# Patient Record
Sex: FEMALE | Race: WHITE | NOT HISPANIC OR LATINO | Employment: FULL TIME | URBAN - METROPOLITAN AREA
[De-identification: names, ages, dates, MRNs, and addresses within clinical notes are randomized per-mention and may not be internally consistent; named-entity substitution may affect disease eponyms.]

---

## 2023-05-26 ENCOUNTER — OCCUPATIONAL HEALTH (OUTPATIENT)
Dept: URGENT CARE | Facility: CLINIC | Age: 58
End: 2023-05-26
Payer: MEDICARE

## 2023-05-26 PROCEDURE — 80305 PR COLLECTION ONLY DRUG SCREEN: ICD-10-PCS | Mod: S$GLB,,, | Performed by: EMERGENCY MEDICINE

## 2023-05-26 PROCEDURE — 80305 DRUG TEST PRSMV DIR OPT OBS: CPT | Mod: S$GLB,,, | Performed by: EMERGENCY MEDICINE

## 2023-06-12 ENCOUNTER — HOSPITAL ENCOUNTER (INPATIENT)
Facility: HOSPITAL | Age: 58
LOS: 9 days | Discharge: NURSING, REHAB OR LONG-TERM CARE | DRG: 271 | End: 2023-06-21
Attending: EMERGENCY MEDICINE | Admitting: INTERNAL MEDICINE
Payer: MEDICARE

## 2023-06-12 DIAGNOSIS — R07.9 CHEST PAIN: ICD-10-CM

## 2023-06-12 DIAGNOSIS — I99.8 VASCULAR OCCLUSION: Primary | ICD-10-CM

## 2023-06-12 DIAGNOSIS — R20.9 COLD RIGHT FOOT: ICD-10-CM

## 2023-06-12 DIAGNOSIS — I70.221 CRITICAL LIMB ISCHEMIA OF RIGHT LOWER EXTREMITY: ICD-10-CM

## 2023-06-12 DIAGNOSIS — I50.9 CHF (CONGESTIVE HEART FAILURE): ICD-10-CM

## 2023-06-12 PROBLEM — K52.0 RADIATION COLITIS: Status: ACTIVE | Noted: 2022-12-23

## 2023-06-12 PROBLEM — Z93.3 COLOSTOMY STATUS: Status: ACTIVE | Noted: 2023-02-23

## 2023-06-12 PROBLEM — C53.9 MALIGNANT NEOPLASM OF CERVIX: Status: ACTIVE | Noted: 2020-11-10

## 2023-06-12 PROBLEM — I10 ESSENTIAL (PRIMARY) HYPERTENSION: Status: ACTIVE | Noted: 2023-01-09

## 2023-06-12 LAB
ALBUMIN SERPL BCP-MCNC: 4.1 G/DL (ref 3.5–5.2)
ALP SERPL-CCNC: 66 U/L (ref 55–135)
ALT SERPL W/O P-5'-P-CCNC: 19 U/L (ref 10–44)
ANION GAP SERPL CALC-SCNC: 9 MMOL/L (ref 8–16)
APTT PPP: 24.5 SEC (ref 21–32)
APTT PPP: 47 SEC (ref 21–32)
AST SERPL-CCNC: 15 U/L (ref 10–40)
BASOPHILS # BLD AUTO: 0.03 K/UL (ref 0–0.2)
BASOPHILS NFR BLD: 0.6 % (ref 0–1.9)
BILIRUB SERPL-MCNC: 0.4 MG/DL (ref 0.1–1)
BILIRUB UR QL STRIP: NEGATIVE
BNP SERPL-MCNC: 56 PG/ML (ref 0–99)
BUN SERPL-MCNC: 19 MG/DL (ref 6–20)
CALCIUM SERPL-MCNC: 8.7 MG/DL (ref 8.7–10.5)
CHLORIDE SERPL-SCNC: 102 MMOL/L (ref 95–110)
CK SERPL-CCNC: 139 U/L (ref 20–180)
CLARITY UR: CLEAR
CO2 SERPL-SCNC: 24 MMOL/L (ref 23–29)
COLOR UR: YELLOW
CREAT SERPL-MCNC: 0.7 MG/DL (ref 0.5–1.4)
CREAT SERPL-MCNC: 0.9 MG/DL (ref 0.5–1.4)
DIFFERENTIAL METHOD: NORMAL
EOSINOPHIL # BLD AUTO: 0.2 K/UL (ref 0–0.5)
EOSINOPHIL NFR BLD: 3.6 % (ref 0–8)
ERYTHROCYTE [DISTWIDTH] IN BLOOD BY AUTOMATED COUNT: 13.3 % (ref 11.5–14.5)
EST. GFR  (NO RACE VARIABLE): >60 ML/MIN/1.73 M^2
GLUCOSE SERPL-MCNC: 92 MG/DL (ref 70–110)
GLUCOSE UR QL STRIP: ABNORMAL
HCT VFR BLD AUTO: 38.5 % (ref 37–48.5)
HGB BLD-MCNC: 12.4 G/DL (ref 12–16)
HGB UR QL STRIP: NEGATIVE
IMM GRANULOCYTES # BLD AUTO: 0.02 K/UL (ref 0–0.04)
IMM GRANULOCYTES NFR BLD AUTO: 0.4 % (ref 0–0.5)
INR PPP: 0.9 (ref 0.8–1.2)
KETONES UR QL STRIP: NEGATIVE
LEUKOCYTE ESTERASE UR QL STRIP: NEGATIVE
LIPASE SERPL-CCNC: 26 U/L (ref 4–60)
LYMPHOCYTES # BLD AUTO: 1.5 K/UL (ref 1–4.8)
LYMPHOCYTES NFR BLD: 28.7 % (ref 18–48)
MAGNESIUM SERPL-MCNC: 1.7 MG/DL (ref 1.6–2.6)
MAGNESIUM SERPL-MCNC: 1.7 MG/DL (ref 1.6–2.6)
MCH RBC QN AUTO: 28.8 PG (ref 27–31)
MCHC RBC AUTO-ENTMCNC: 32.2 G/DL (ref 32–36)
MCV RBC AUTO: 90 FL (ref 82–98)
MONOCYTES # BLD AUTO: 0.5 K/UL (ref 0.3–1)
MONOCYTES NFR BLD: 9.5 % (ref 4–15)
NEUTROPHILS # BLD AUTO: 3 K/UL (ref 1.8–7.7)
NEUTROPHILS NFR BLD: 57.2 % (ref 38–73)
NITRITE UR QL STRIP: NEGATIVE
NRBC BLD-RTO: 0 /100 WBC
PH UR STRIP: 6 [PH] (ref 5–8)
PLATELET # BLD AUTO: 222 K/UL (ref 150–450)
PMV BLD AUTO: 9.7 FL (ref 9.2–12.9)
POTASSIUM SERPL-SCNC: 4.1 MMOL/L (ref 3.5–5.1)
PROT SERPL-MCNC: 7 G/DL (ref 6–8.4)
PROT UR QL STRIP: NEGATIVE
PROTHROMBIN TIME: 9.7 SEC (ref 9–12.5)
RBC # BLD AUTO: 4.3 M/UL (ref 4–5.4)
SAMPLE: NORMAL
SODIUM SERPL-SCNC: 135 MMOL/L (ref 136–145)
SP GR UR STRIP: 1.02 (ref 1–1.03)
TROPONIN I SERPL HS-MCNC: 2.7 PG/ML (ref 0–14.9)
TROPONIN I SERPL HS-MCNC: <2.3 PG/ML (ref 0–14.9)
TSH SERPL DL<=0.005 MIU/L-ACNC: 2.93 UIU/ML (ref 0.34–5.6)
URN SPEC COLLECT METH UR: ABNORMAL
UROBILINOGEN UR STRIP-ACNC: NEGATIVE EU/DL
WBC # BLD AUTO: 5.27 K/UL (ref 3.9–12.7)

## 2023-06-12 PROCEDURE — 25500020 PHARM REV CODE 255: Performed by: SURGERY

## 2023-06-12 PROCEDURE — 25000003 PHARM REV CODE 250: Performed by: SURGERY

## 2023-06-12 PROCEDURE — 25500020 PHARM REV CODE 255: Performed by: EMERGENCY MEDICINE

## 2023-06-12 PROCEDURE — 63600175 PHARM REV CODE 636 W HCPCS: Performed by: SURGERY

## 2023-06-12 PROCEDURE — 99153 MOD SED SAME PHYS/QHP EA: CPT | Performed by: SURGERY

## 2023-06-12 PROCEDURE — 82550 ASSAY OF CK (CPK): CPT | Performed by: EMERGENCY MEDICINE

## 2023-06-12 PROCEDURE — C1887 CATHETER, GUIDING: HCPCS | Performed by: SURGERY

## 2023-06-12 PROCEDURE — 84443 ASSAY THYROID STIM HORMONE: CPT | Performed by: EMERGENCY MEDICINE

## 2023-06-12 PROCEDURE — 36415 COLL VENOUS BLD VENIPUNCTURE: CPT | Performed by: EMERGENCY MEDICINE

## 2023-06-12 PROCEDURE — 85610 PROTHROMBIN TIME: CPT | Performed by: EMERGENCY MEDICINE

## 2023-06-12 PROCEDURE — 37221 HC ILIAC REVASC W/STENT: CPT | Mod: RT | Performed by: SURGERY

## 2023-06-12 PROCEDURE — 99291 CRITICAL CARE FIRST HOUR: CPT

## 2023-06-12 PROCEDURE — 37184 PRIM ART M-THRMBC 1ST VSL: CPT | Mod: RT | Performed by: SURGERY

## 2023-06-12 PROCEDURE — 83735 ASSAY OF MAGNESIUM: CPT | Performed by: EMERGENCY MEDICINE

## 2023-06-12 PROCEDURE — 84484 ASSAY OF TROPONIN QUANT: CPT | Performed by: EMERGENCY MEDICINE

## 2023-06-12 PROCEDURE — 96365 THER/PROPH/DIAG IV INF INIT: CPT

## 2023-06-12 PROCEDURE — 83880 ASSAY OF NATRIURETIC PEPTIDE: CPT | Performed by: EMERGENCY MEDICINE

## 2023-06-12 PROCEDURE — 85730 THROMBOPLASTIN TIME PARTIAL: CPT | Mod: 91 | Performed by: EMERGENCY MEDICINE

## 2023-06-12 PROCEDURE — 63600175 PHARM REV CODE 636 W HCPCS: Performed by: EMERGENCY MEDICINE

## 2023-06-12 PROCEDURE — 20000000 HC ICU ROOM

## 2023-06-12 PROCEDURE — 93010 ELECTROCARDIOGRAM REPORT: CPT | Mod: ,,, | Performed by: GENERAL PRACTICE

## 2023-06-12 PROCEDURE — 99900031 HC PATIENT EDUCATION (STAT)

## 2023-06-12 PROCEDURE — 93010 EKG 12-LEAD: ICD-10-PCS | Mod: ,,, | Performed by: GENERAL PRACTICE

## 2023-06-12 PROCEDURE — 96366 THER/PROPH/DIAG IV INF ADDON: CPT

## 2023-06-12 PROCEDURE — 37185 PRIM ART M-THRMBC SBSQ VSL: CPT | Mod: RT | Performed by: SURGERY

## 2023-06-12 PROCEDURE — 81003 URINALYSIS AUTO W/O SCOPE: CPT | Performed by: EMERGENCY MEDICINE

## 2023-06-12 PROCEDURE — 93005 ELECTROCARDIOGRAM TRACING: CPT | Performed by: GENERAL PRACTICE

## 2023-06-12 PROCEDURE — C1757 CATH, THROMBECTOMY/EMBOLECT: HCPCS | Performed by: SURGERY

## 2023-06-12 PROCEDURE — 99152 MOD SED SAME PHYS/QHP 5/>YRS: CPT | Performed by: SURGERY

## 2023-06-12 PROCEDURE — C1894 INTRO/SHEATH, NON-LASER: HCPCS | Performed by: SURGERY

## 2023-06-12 PROCEDURE — 36247 INS CATH ABD/L-EXT ART 3RD: CPT | Mod: XU,RT | Performed by: SURGERY

## 2023-06-12 PROCEDURE — 85025 COMPLETE CBC W/AUTO DIFF WBC: CPT | Performed by: EMERGENCY MEDICINE

## 2023-06-12 PROCEDURE — 80053 COMPREHEN METABOLIC PANEL: CPT | Performed by: EMERGENCY MEDICINE

## 2023-06-12 PROCEDURE — C1876 STENT, NON-COA/NON-COV W/DEL: HCPCS | Performed by: SURGERY

## 2023-06-12 PROCEDURE — 96375 TX/PRO/DX INJ NEW DRUG ADDON: CPT

## 2023-06-12 PROCEDURE — 83690 ASSAY OF LIPASE: CPT | Performed by: EMERGENCY MEDICINE

## 2023-06-12 PROCEDURE — C1725 CATH, TRANSLUMIN NON-LASER: HCPCS | Performed by: SURGERY

## 2023-06-12 PROCEDURE — C1769 GUIDE WIRE: HCPCS | Performed by: SURGERY

## 2023-06-12 DEVICE — STENT PRB35-08-060-080 PROTEGE EF V07
Type: IMPLANTABLE DEVICE | Site: ARTERIAL | Status: FUNCTIONAL
Brand: EVERFLEX™ PROTÉGÉ™ EVERFLEX™

## 2023-06-12 RX ORDER — ONDANSETRON 2 MG/ML
4 INJECTION INTRAMUSCULAR; INTRAVENOUS
Status: COMPLETED | OUTPATIENT
Start: 2023-06-12 | End: 2023-06-12

## 2023-06-12 RX ORDER — PREGABALIN 75 MG/1
75 CAPSULE ORAL 2 TIMES DAILY
COMMUNITY
Start: 2023-06-08 | End: 2023-07-04 | Stop reason: SDUPTHER

## 2023-06-12 RX ORDER — MORPHINE SULFATE 2 MG/ML
2 INJECTION, SOLUTION INTRAMUSCULAR; INTRAVENOUS
Status: COMPLETED | OUTPATIENT
Start: 2023-06-12 | End: 2023-06-12

## 2023-06-12 RX ORDER — CYCLOBENZAPRINE HCL 10 MG
10 TABLET ORAL 3 TIMES DAILY PRN
COMMUNITY
End: 2023-07-04 | Stop reason: SDUPTHER

## 2023-06-12 RX ORDER — FENTANYL CITRATE 50 UG/ML
INJECTION, SOLUTION INTRAMUSCULAR; INTRAVENOUS
Status: DISCONTINUED | OUTPATIENT
Start: 2023-06-12 | End: 2023-06-12 | Stop reason: HOSPADM

## 2023-06-12 RX ORDER — NORTRIPTYLINE HYDROCHLORIDE 25 MG/1
50 CAPSULE ORAL NIGHTLY
COMMUNITY
Start: 2023-06-08 | End: 2023-07-04 | Stop reason: SDUPTHER

## 2023-06-12 RX ORDER — FENTANYL CITRATE 50 UG/ML
25 INJECTION, SOLUTION INTRAMUSCULAR; INTRAVENOUS
Status: DISCONTINUED | OUTPATIENT
Start: 2023-06-12 | End: 2023-06-17

## 2023-06-12 RX ORDER — HEPARIN SODIUM 10000 [USP'U]/100ML
INJECTION, SOLUTION INTRAVENOUS
Status: DISCONTINUED | OUTPATIENT
Start: 2023-06-12 | End: 2023-06-13

## 2023-06-12 RX ORDER — HEPARIN SODIUM,PORCINE/D5W 25000/250
0-40 INTRAVENOUS SOLUTION INTRAVENOUS CONTINUOUS
Status: DISCONTINUED | OUTPATIENT
Start: 2023-06-12 | End: 2023-06-13

## 2023-06-12 RX ORDER — MIDAZOLAM HYDROCHLORIDE 1 MG/ML
INJECTION INTRAMUSCULAR; INTRAVENOUS
Status: DISCONTINUED | OUTPATIENT
Start: 2023-06-12 | End: 2023-06-12 | Stop reason: HOSPADM

## 2023-06-12 RX ORDER — ONDANSETRON 4 MG/1
4 TABLET, ORALLY DISINTEGRATING ORAL EVERY 8 HOURS PRN
COMMUNITY
Start: 2023-01-26 | End: 2023-07-04 | Stop reason: SDUPTHER

## 2023-06-12 RX ORDER — OXYCODONE AND ACETAMINOPHEN 10; 325 MG/1; MG/1
1 TABLET ORAL 3 TIMES DAILY PRN
Status: ON HOLD | COMMUNITY
Start: 2023-06-08 | End: 2023-06-21 | Stop reason: HOSPADM

## 2023-06-12 RX ORDER — LIDOCAINE HYDROCHLORIDE 10 MG/ML
INJECTION INFILTRATION; PERINEURAL
Status: DISCONTINUED | OUTPATIENT
Start: 2023-06-12 | End: 2023-06-12 | Stop reason: HOSPADM

## 2023-06-12 RX ORDER — IODIXANOL 320 MG/ML
INJECTION, SOLUTION INTRAVASCULAR
Status: DISCONTINUED | OUTPATIENT
Start: 2023-06-12 | End: 2023-06-12 | Stop reason: HOSPADM

## 2023-06-12 RX ORDER — HEPARIN SODIUM 10000 [USP'U]/ML
INJECTION, SOLUTION INTRAVENOUS; SUBCUTANEOUS
Status: DISCONTINUED | OUTPATIENT
Start: 2023-06-12 | End: 2023-06-12 | Stop reason: HOSPADM

## 2023-06-12 RX ADMIN — HEPARIN SODIUM 18 UNITS/KG/HR: 10000 INJECTION, SOLUTION INTRAVENOUS at 01:06

## 2023-06-12 RX ADMIN — FENTANYL CITRATE 25 MCG: 50 INJECTION, SOLUTION INTRAMUSCULAR; INTRAVENOUS at 07:06

## 2023-06-12 RX ADMIN — ONDANSETRON 4 MG: 2 INJECTION INTRAMUSCULAR; INTRAVENOUS at 12:06

## 2023-06-12 RX ADMIN — MORPHINE SULFATE 2 MG: 2 INJECTION, SOLUTION INTRAMUSCULAR; INTRAVENOUS at 12:06

## 2023-06-12 RX ADMIN — IOHEXOL 100 ML: 350 INJECTION, SOLUTION INTRAVENOUS at 02:06

## 2023-06-12 NOTE — H&P (VIEW-ONLY)
"Carine Darden is a 57 y.o. White female with known history of cervical cancer s/p chemoradiation, colostomy status, chronic tobacco use presented to the ED for evaluation of acute onset right leg pain that started last night. She reports developing "silvio horse" x 2 weeks. Then last night her Right foot developed severe pain and was very cool to the touch. Also had "numbness and tingling" and hip pain.This morning she went to work but due to persistent pain coworker called EMS and she was brought to the ER for evaluation. In the ER, she had no pulses noted in the right lower extremity. CTA showed         Complete occlusion of the right common iliac artery immediately distal to its origin.  2. While the superficial femoral artery on the right is patent without high-grade stenosis, there is occlusion of the right popliteal artery, with flow to the right calf via a small intramuscular branches.  3. Occlusion of the tibioperoneal trunk on the left. The anterior tibial artery is patent at its origin, but becomes threadlike distal to that point. Flow to the left ankle is noted be a small intramuscular collaterals and a threadlike anterior tibial artery.     CT scan from last year of the abdomen and pelvis did not show any atherosclerotic disease of significance.  Both iliac vessels appeared normal.  She does not appear to be in atrial fibrillation.  Right common internal and proximal external iliac artery are occluded with fairly normal runoff.      Impression is right iliac occlusion.  Unsure of etiology is the patient does not have any history of atrial fibrillation.  She does not have any significant radiation skin changes to the pelvis are groin about I suppose this could be possibility from her cervical cancer radiation  .  Recommend angiography today with percutaneous embolectomy thrombectomy.  Will need echocardiogram post angiography.  "

## 2023-06-12 NOTE — Clinical Note
An angiography of the  abdominal aorta was performed with the catheter and hand injected with 20 mL of contrast

## 2023-06-12 NOTE — OP NOTE
[facility]  Surgery Department  Operative Note    SUMMARY     Date of Procedure: 6/12/2023     Procedure: Procedure(s) (LRB):  Angiogram, Abdominal Aorta W/ Extremity Runoff (Right)  EMBOLECTOMY OR THROMBECTOMY, BLOOD VESSEL, LOWER EXTREMITY (Right)  PTA, Iliac Artery (Right)  Stent, Iliac Artery (Right)  THROMBOLYSIS OR THROMBECTOMY, BLOOD VESSEL, LOWER EXTREMITY (Right)     Surgeon(s) and Role:     * Moody Aleman MD - Primary    Assisting Surgeon: None    Pre-Operative Diagnosis: Cold right foot [R20.9]    Post-Operative Diagnosis: Post-Op Diagnosis Codes:     * Cold right foot [R20.9]    Anesthesia: RN IV Sedation    Operative Findings (including complications, if any):  Right common internal and external iliac artery thrombosis.  Distal right popliteal thrombosis with no visualization of tibial reconstitution    Description of Technical Procedures:  Abdominal aortogram.  Right lower extremity angiogram.  Bilateral femoral access.  Right iliac percutaneous mechanical thrombectomy with 6 Bangladeshi Angiojet catheter.  Stent angioplasty right common iliac severe focal stenosis with 8 x 60 self expanding stent.  Right popliteal catheterization from left femoral access with 10 cm infusion length correct Delroy catheter for tPA infusion    Patient was brought emergently to the cath lab was sedated with fentanyl and Versed.  Right femoral artery was accessed just above the femoral bifurcation with ultrasound guidance and a 6 Bangladeshi sheath was placed just into the distal external iliac artery angiogram was performed which showed occlusion of the distal external iliac just above the epigastric vessel common femoral profunda SFA were patent the distal popliteal was occluded with no visualization of tibial vessels reconstituting.  We then passed a Glidewire across the iliac occlusion into the abdominal aorta confirm we are within the true lumen of the abdominal aorta and Angiojet thrombectomy with 6 Bangladeshi  catheter was performed of the entire common and external iliac artery on the right side we had near 100% resolution of thrombus and uncovered a distal right common iliac artery stenosis.  This was treated with a 8 x 60 self expanding stent and post dilated with a 7 mm balloon with excellent result we then had rapid flow through the iliac and common femoral with no evidence of stenosis the SFA was patent and the popliteal occludes at the level of the joint and there is small collateral vessels seen below the knee but no named tibial vessels were visualized.  Then accessed the left femoral artery under roadmap imaging and advanced catheter up and over the bifurcation into the right SFA and then the 6 Tongan sheath was placed over the bifurcation a trial laser catheter was then used to cross into the right popliteal occlusion and a soft Glidewire was advanced which would go into the peroneal and the anterior tibial but would not advance into a true lumen after multiple attempts decided to place an infusion catheter see if we can recruit any distal runoff.  Exchanged out the long 6 Tongan sheath for a short 6 Tongan sheath so we did not have to 6 Tongan sheath in the right femoral artery.  Catheter was advanced over the wire into the popliteal and everything was sutured in place and tPA will be started at half a mg an hour.    Significant Surgical Tasks Conducted by the Assistant(s), if Applicable:     Estimated Blood Loss (EBL): * No values recorded between 6/12/2023  4:40 PM and 6/12/2023  5:45 PM *           Implants:   Implant Name Type Inv. Item Serial No.  Lot No. LRB No. Used Action   EVER FLEX [id number] STENT Self Expanding Stent   [employer] [id number] Right 1 Implanted       Specimens:   Specimen (24h ago, onward)      None                    Condition: Good    Disposition: PACU - hemodynamically stable.    Attestation: I was present and scrubbed for the entire procedure.

## 2023-06-12 NOTE — Clinical Note
An angiography of the  right lower extremity was performed with the sheath and hand injected with 10 mL of contrast

## 2023-06-12 NOTE — Clinical Note
An angiography was performed of the mid and distal right popliteal artery and infrapopliteal artery via hand injection with 5 mL of contrast

## 2023-06-12 NOTE — Clinical Note
The site was marked. The groin was prepped. The site was prepped with ChloraPrep. The site was clipped. The patient was draped. The patient was positioned supine. The patient was secured using safety straps and to an armboard.

## 2023-06-12 NOTE — Clinical Note
An angiography was performed of the ostial, proximal, mid and distal right external iliac artery via hand injection with 10 mL of contrast

## 2023-06-12 NOTE — Clinical Note
An angiography was performed of the ostial, proximal, mid and distal right external iliac artery via hand injection with 20 mL of contrast

## 2023-06-12 NOTE — Clinical Note
A dressing was applied to the right femoral artery puncture site. Learning About Meal Planning for Diabetes  Why plan your meals? Meal planning can be a key part of managing diabetes. Planning meals and snacks with the right balance of carbohydrate, protein, and fat can help you keep your blood sugar at the target level you set with your doctor. You don't have to eat special foods. You can eat what your family eats, including sweets once in a while. But you do have to pay attention to how often you eat and how much you eat of certain foods. You may want to work with a dietitian or a certified diabetes educator. He or she can give you tips and meal ideas and can answer your questions about meal planning. This health professional can also help you reach a healthy weight if that is one of your goals. What plan is right for you? Your dietitian or diabetes educator may suggest that you start with the plate format or carbohydrate counting. The plate format  The plate format is a simple way to help you manage how you eat. You plan meals by learning how much space each food should take on a plate. Using the plate format helps you spread carbohydrate throughout the day. It can make it easier to keep your blood sugar level within your target range. It also helps you see if you're eating healthy portion sizes. To use the plate format, you put non-starchy vegetables on half your plate. Add meat or meat substitutes on one-quarter of the plate. Put a grain or starchy vegetable (such as brown rice or a potato) on the final quarter of the plate. You can add a small piece of fruit and some low-fat or fat-free milk or yogurt, depending on your carbohydrate goal for each meal.  Here are some tips for using the plate format:  · Make sure that you are not using an oversized plate. A 9-inch plate is best. Many restaurants use larger plates. · Get used to using the plate format at home. Then you can use it when you eat out. · Write down your questions about using the plate format.  Talk to your doctor, a dietitian, or a diabetes educator about your concerns. Carbohydrate counting  With carbohydrate counting, you plan meals based on the amount of carbohydrate in each food. Carbohydrate raises blood sugar higher and more quickly than any other nutrient. It is found in desserts, breads and cereals, and fruit. It's also found in starchy vegetables such as potatoes and corn, grains such as rice and pasta, and milk and yogurt. Spreading carbohydrate throughout the day helps keep your blood sugar levels within your target range. Your daily amount depends on several things, including your weight, how active you are, which diabetes medicines you take, and what your goals are for your blood sugar levels. A registered dietitian or diabetes educator can help you plan how much carbohydrate to include in each meal and snack. A guideline for your daily amount of carbohydrate is:  · 45 to 60 grams at each meal. That's about the same as 3 to 4 carbohydrate servings. · 15 to 20 grams at each snack. That's about the same as 1 carbohydrate serving. The Nutrition Facts label on packaged foods tells you how much carbohydrate is in a serving of the food. First, look at the serving size on the food label. Is that the amount you eat in a serving? All of the nutrition information on a food label is based on that serving size. So if you eat more or less than that, you'll need to adjust the other numbers. Total carbohydrate is the next thing you need to look for on the label. If you count carbohydrate servings, one serving of carbohydrate is 15 grams. For foods that don't come with labels, such as fresh fruits and vegetables, you'll need a guide that lists carbohydrate in these foods. Ask your doctor, dietitian, or diabetes educator about books or other nutrition guides you can use.   If you take insulin, you need to know how many grams of carbohydrate are in a meal. This lets you know how much rapid-acting insulin to take before you eat. If you use an insulin pump, you get a constant rate of insulin during the day. So the pump must be programmed at meals to give you extra insulin to cover the rise in blood sugar after meals. When you know how much carbohydrate you will eat, you can take the right amount of insulin. Or, if you always use the same amount of insulin, you need to make sure that you eat the same amount of carbohydrate at meals. If you need more help to understand carbohydrate counting and food labels, ask your doctor, dietitian, or diabetes educator. How do you get started with meal planning? Here are some tips to get started:  · Plan your meals a week at a time. Don't forget to include snacks too. · Use cookbooks or online recipes to plan several main meals. Plan some quick meals for busy nights. You also can double some recipes that freeze well. Then you can save half for other busy nights when you don't have time to cook. · Make sure you have the ingredients you need for your recipes. If you're running low on basic items, put these items on your shopping list too. · List foods that you use to make breakfasts, lunches, and snacks. List plenty of fruits and vegetables. · Post this list on the refrigerator. Add to it as you think of more things you need. · Take the list to the store to do your weekly shopping. Follow-up care is a key part of your treatment and safety. Be sure to make and go to all appointments, and call your doctor if you are having problems. It's also a good idea to know your test results and keep a list of the medicines you take. Where can you learn more? Go to http://indio-kavita.info/. Marly Branch in the search box to learn more about \"Learning About Meal Planning for Diabetes. \"  Current as of: March 13, 2017  Content Version: 11.3  © 8642-5672 [employer].  Care instructions adapted under license by [employer] (which disclaims liability or warranty for this information). If you have questions about a medical condition or this instruction, always ask your healthcare professional. [place] [address] any warranty or liability for your use of this information.

## 2023-06-12 NOTE — CONSULTS
"Carine Darden is a 57 y.o. White female with known history of cervical cancer s/p chemoradiation, colostomy status, chronic tobacco use presented to the ED for evaluation of acute onset right leg pain that started last night. She reports developing "silvio horse" x 2 weeks. Then last night her Right foot developed severe pain and was very cool to the touch. Also had "numbness and tingling" and hip pain.This morning she went to work but due to persistent pain coworker called EMS and she was brought to the ER for evaluation. In the ER, she had no pulses noted in the right lower extremity. CTA showed         Complete occlusion of the right common iliac artery immediately distal to its origin.  2. While the superficial femoral artery on the right is patent without high-grade stenosis, there is occlusion of the right popliteal artery, with flow to the right calf via a small intramuscular branches.  3. Occlusion of the tibioperoneal trunk on the left. The anterior tibial artery is patent at its origin, but becomes threadlike distal to that point. Flow to the left ankle is noted be a small intramuscular collaterals and a threadlike anterior tibial artery.     CT scan from last year of the abdomen and pelvis did not show any atherosclerotic disease of significance.  Both iliac vessels appeared normal.  She does not appear to be in atrial fibrillation.  Right common internal and proximal external iliac artery are occluded with fairly normal runoff.      Impression is right iliac occlusion.  Unsure of etiology is the patient does not have any history of atrial fibrillation.  She does not have any significant radiation skin changes to the pelvis are groin about I suppose this could be possibility from her cervical cancer radiation  .  Recommend angiography today with percutaneous embolectomy thrombectomy.  Will need echocardiogram post angiography.  " alert and oriented x3/normal behavior

## 2023-06-12 NOTE — H&P
"[facility] History & Physical Examination   Patient Name: Carine Darden  MRN: [id number]  Patient Class: IP- Inpatient   Admission Date: 6/12/2023 11:06 AM  Length of Stay: 0  Attending Physician: Jasmeet Balbuena MD  Primary Care Provider: Primary Doctor No  Face-to-Face encounter date: 06/12/2023  Code Status: Full Code  Chief Complaint: Leg Pain         HISTORY OF PRESENT ILLNESS:   Carine Darden is a 57 y.o. White female with known history of cervical cancer s/p chemoradiation, colostomy status, chronic tobacco use presented to the ED for evaluation of acute onset right leg pain that started last night. She reports developing "silvio horse" x 2 weeks. Then last night her Right foot developed severe pain and was very cool to the touch. Also had "numbness and tingling" and hip pain.This morning she went to work but due to persistent pain coworker called EMS and she was brought to the ER for evaluation. In the ER, she had no pulses noted in the right lower extremity. CTA showed       Complete occlusion of the right common iliac artery immediately distal to its origin.  2. While the superficial femoral artery on the right is patent without high-grade stenosis, there is occlusion of the right popliteal artery, with flow to the right calf via a small intramuscular branches.  3. Occlusion of the tibioperoneal trunk on the left. The anterior tibial artery is patent at its origin, but becomes threadlike distal to that point. Flow to the left ankle is noted be a small intramuscular collaterals and a threadlike anterior tibial artery.    Vascular surgery consulted and plan is to take her to OR this evening. Heparin has been initiated. Hospital medicine consulted for admission.   REVIEW OF SYSTEMS:   10 Point Review of System was performed and was found to be negative except for that mentioned already in the HPI above.     PAST MEDICAL HISTORY:   cervical cancer s/p chemoradiation, colostomy " "status, chronic tobacco    PAST SURGICAL HISTORY:   colostomy    ALLERGIES:   Reviewed but not pertinent     SOCIAL HISTORY:     Chronic tobacco abuse    HOME MEDICATIONS:     Prior to Admission medications    Medication Sig Start Date End Date Taking? Authorizing Provider   cyclobenzaprine (FLEXERIL) 10 MG tablet Take 10 mg by mouth 3 (three) times daily as needed for Muscle spasms.   Yes Historical Provider   nortriptyline (PAMELOR) 25 MG capsule Take 50 mg by mouth every evening. 6/8/23  Yes Historical Provider   ondansetron (ZOFRAN-ODT) 4 MG TbDL Take 4 mg by mouth every 8 (eight) hours as needed. 1/26/23  Yes Historical Provider   oxyCODONE-acetaminophen (PERCOCET)  mg per tablet Take 1 tablet by mouth 3 (three) times daily as needed for Pain. 6/8/23  Yes Historical Provider   pregabalin (LYRICA) 75 MG capsule Take 75 mg by mouth 2 (two) times daily. 6/8/23  Yes Historical Provider         PHYSICAL EXAM:   /76   Pulse 72   Temp 98.3 °F (36.8 °C) (Oral)   Resp 18   Ht 5' 2" (1.575 m)   Wt 84.8 kg (187 lb)   SpO2 96%   BMI 34.20 kg/m²   Vitals Reviewed  General appearance: non-toxic and not acutely ill-appearingWhite female in no apparent distress.  Skin: No Rash.   Neuro: Motor and sensory exams grossly intact. Good tone. Power in all 4 extremities 5/5.   HENT: Atraumatic head. Moist mucous membranes of oral cavity.  Eyes: Normal extraocular movements.   Neck: Supple. No evidence of lymphadenopathy. No thyroidomegaly.  Lungs: Clear to auscultation bilaterally. No wheezing present.   Heart: Regular rate and rhythm. S1 and S2 present with no murmurs/gallop/rub. No pedal edema. No JVD present.   Abdomen: Soft, non-distended, non-tender. No rebound tenderness/guarding. No masses or organomegaly. Bowel sounds are normal. Bladder is not palpable.   Extremities: discoloration of the right lower extremity. Its cold to touch. Not able to feel any pulse  Psych/mental status: Alert and oriented. " Cooperative. Responds appropriately to questions.   EMERGENCY DEPARTMENT LABS AND IMAGING:     Labs Reviewed   COMPREHENSIVE METABOLIC PANEL - Abnormal; Notable for the following components:       Result Value    Sodium 135 (*)     All other components within normal limits   URINALYSIS, REFLEX TO URINE CULTURE - Abnormal; Notable for the following components:    Glucose, UA Trace (*)     All other components within normal limits    Narrative:     Specimen Source->Urine   MAGNESIUM   CBC W/ AUTO DIFFERENTIAL   TROPONIN I HIGH SENSITIVITY   TROPONIN I HIGH SENSITIVITY   B-TYPE NATRIURETIC PEPTIDE   LIPASE   CK   MAGNESIUM   TSH   APTT   PROTIME-INR   PROTIME-INR   APTT   ISTAT CREATININE       CTA Runoff ABD PEL Bilat Lower Ext   Final Result      X-Ray Chest AP Portable   Final Result          ASSESSMENT & PLAN:   Carine Darden is a 57 y.o. female admitted for    Active Hospital Problems    Diagnosis    *Critical limb ischemia of right lower extremity    Colostomy status    Essential (primary) hypertension    Radiation colitis    Malignant neoplasm of cervix       Plan  Admit to ICU  Cardiac monitoring  Heparin GTT  Vascular surgery consulted for angiogram  NPO for now  Resume home medications  Oxycodone and Morphine for pain management  Counseled on smoking cessation    Diet: Cardiac    DVT Prophylaxis: heparin and Encourage ambulation. OOB as tolerated.     Discharge Planning and Disposition:  Home with assistance from family    Expected LOS: 2-3 days    This patient is high risk for life-threatening deterioration and death secondary to above comorbidities and need for IV treatment. This patient meets inpatient criteria.   ________________________________________________________________________________    Face-to-Face encounter date: 06/12/2023  Encounter included review of the medical records, interviewing and examining the patient face-to-face, discussion with family and other health care providers including  emergency medicine physician, admission orders, interpreting lab/test results and formulating a plan of care.   Medical Decision Making during this encounter was High Complexity due to \critical limb ischemia  ________________________________________________________________________________    INPATIENT LIST OF MEDICATIONS     Current Facility-Administered Medications:     heparin 25,000 units in dextrose 5% (100 units/ml) IV bolus from bag - ADDITIONAL PRN BOLUS - 30 units/kg, 30 Units/kg (Adjusted), Intravenous, PRN, Kiana Mckeon MD    heparin 25,000 units in dextrose 5% (100 units/ml) IV bolus from bag - ADDITIONAL PRN BOLUS - 60 units/kg, 60 Units/kg (Adjusted), Intravenous, PRN, Kiana Mckeon MD    heparin 25,000 units in dextrose 5% 250 mL (100 units/mL) infusion HIGH INTENSITY nomogram - [facility], 0-40 Units/kg/hr (Adjusted), Intravenous, Continuous, Kiana Mckeon MD, Last Rate: 11.5 mL/hr at 06/12/23 1303, 18 Units/kg/hr at 06/12/23 1303    Current Outpatient Medications:     cyclobenzaprine (FLEXERIL) 10 MG tablet, Take 10 mg by mouth 3 (three) times daily as needed for Muscle spasms., Disp: , Rfl:     nortriptyline (PAMELOR) 25 MG capsule, Take 50 mg by mouth every evening., Disp: , Rfl:     ondansetron (ZOFRAN-ODT) 4 MG TbDL, Take 4 mg by mouth every 8 (eight) hours as needed., Disp: , Rfl:     oxyCODONE-acetaminophen (PERCOCET)  mg per tablet, Take 1 tablet by mouth 3 (three) times daily as needed for Pain., Disp: , Rfl:     pregabalin (LYRICA) 75 MG capsule, Take 75 mg by mouth 2 (two) times daily., Disp: , Rfl:       Scheduled Meds:  Continuous Infusions:   heparin (porcine) in D5W 18 Units/kg/hr (06/12/23 1303)     PRN Meds:.heparin (PORCINE), heparin (PORCINE)      Jasmeet Balbuena  [facility] Hospitalist  06/12/2023

## 2023-06-12 NOTE — ED PROVIDER NOTES
"Encounter Date: 6/12/2023       History     Chief Complaint   Patient presents with    Leg Pain     Pt R foot is cool to the touch. 10/10 pain stated by pt. Started 9pm last night. Pt states "numbness and tingling" and hip pain.      This is a 57-year-old female with a history of previous cervical cancer with colon resection, current colostomy, reports being cancer free who presents complaining of pain tingling and numbness to the right lower leg that started last night at 9:00 a.m..  The foot feels "asleep" she denies trauma.  The pain has been worsening today.  She attempted to go to work at [employer] but was unable to work secondary to the severe pain.  She reports that about 2 weeks ago she had similar pain that resolved spontaneously.  She denies any history of claudication, chest pain or shortness of breath.  She is a current smoker.  She has no known vascular disease.  Symptoms are moderate to severe in intensity and are worse if she attempts to move her foot or toes and reports spasm like pain.  She denies chest pain headache or shortness of breath.  She denies fever chills or trauma.  She denies any other problems or complaints.      Review of patient's allergies indicates:  Not on File  No past medical history on file.  No past surgical history on file.  No family history on file.     Review of Systems   Constitutional: Negative.  Negative for activity change, appetite change, chills, fatigue and fever.   HENT: Negative.  Negative for congestion, ear pain, rhinorrhea, sore throat and trouble swallowing.    Eyes: Negative.  Negative for photophobia, pain, redness and visual disturbance.   Respiratory: Negative.  Negative for cough, chest tightness, shortness of breath and wheezing.    Cardiovascular: Negative.  Negative for chest pain, palpitations and leg swelling.   Gastrointestinal: Negative.  Negative for abdominal distention, abdominal pain, blood in stool, constipation, diarrhea, nausea and vomiting. "   Endocrine: Negative.    Genitourinary: Negative.  Negative for decreased urine volume, difficulty urinating, dysuria, flank pain, frequency, pelvic pain and urgency.   Musculoskeletal:  Positive for myalgias. Negative for arthralgias, back pain, joint swelling, neck pain and neck stiffness.   Skin: Negative.  Negative for rash.   Neurological:  Positive for numbness. Negative for dizziness, syncope, facial asymmetry, speech difficulty, weakness, light-headedness and headaches.   Hematological:  Does not bruise/bleed easily.   Psychiatric/Behavioral: Negative.  Negative for confusion.    All other systems reviewed and are negative.    Physical Exam     Initial Vitals [06/12/23 1108]   BP Pulse Resp Temp SpO2   (!) 147/73 75 18 98.3 °F (36.8 °C) 95 %      MAP       --         Physical Exam    Nursing note and vitals reviewed.  Constitutional: She is not diaphoretic. She is active and cooperative.  Non-toxic appearance. She does not have a sickly appearance. She does not appear ill. No distress.   HENT:   Head: Normocephalic and atraumatic.   Right Ear: Tympanic membrane normal.   Left Ear: Tympanic membrane normal.   Nose: Nose normal.   Mouth/Throat: Uvula is midline, oropharynx is clear and moist and mucous membranes are normal. No oral lesions. No uvula swelling. No oropharyngeal exudate, posterior oropharyngeal edema or posterior oropharyngeal erythema.   Eyes: Conjunctivae, EOM and lids are normal. Pupils are equal, round, and reactive to light. No scleral icterus.   Neck: Trachea normal and phonation normal. Neck supple. No thyroid mass and no thyromegaly present. No stridor present. No JVD present.   Normal range of motion.   Full passive range of motion without pain.     Cardiovascular:  Normal rate, regular rhythm, normal heart sounds, intact distal pulses and normal pulses.     Exam reveals no gallop, no distant heart sounds, no friction rub and no decreased pulses.       No murmur heard.  Pulmonary/Chest:  Effort normal and breath sounds normal. No accessory muscle usage or stridor. No tachypnea. No respiratory distress. She has no wheezes. She has no rhonchi. She has no rales.   Abdominal: Abdomen is soft. Bowel sounds are normal. She exhibits no distension, no pulsatile midline mass and no mass. There is no abdominal tenderness. There is no rigidity and no guarding.   Musculoskeletal:         General: No edema.      Right hand: Normal. Normal range of motion. Normal strength. Normal sensation. Normal capillary refill. Normal pulse.      Left hand: Normal. Normal range of motion. Normal strength. Normal sensation. Normal capillary refill. Normal pulse.      Cervical back: Normal, full passive range of motion without pain, normal range of motion and neck supple. No edema, erythema, rigidity or bony tenderness. No spinous process tenderness or muscular tenderness. Normal range of motion.      Thoracic back: Normal. No bony tenderness. Normal range of motion.      Lumbar back: Normal. No bony tenderness. Normal range of motion.      Right lower leg: Tenderness present. No swelling. No edema.      Left lower leg: Normal.      Right ankle: No swelling or deformity. Normal range of motion. Abnormal pulse.      Right Achilles Tendon: Normal.      Left ankle: Normal.      Right foot: Decreased range of motion and decreased capillary refill. Tenderness present. No swelling, deformity or bony tenderness. Abnormal pulse.      Left foot: Normal. Normal range of motion and normal capillary refill. No tenderness or bony tenderness. Normal pulse.      Comments: Right lower extremity with coldness to the touch starting from the distal 3rd of the lower leg extending into the foot.  Right foot pale, cap refill absent, no palpable pulses and no dopplerable pulses from the popliteal extending to the entirety of the rest of the right lower extremity.  Pain with passive stretch of the foot, decreased sensation and plantar and dorsiflexion  of the foot noted.     Neurological: She is alert and oriented to person, place, and time. She has normal strength. She displays normal reflexes. No cranial nerve deficit or sensory deficit. Gait normal.   No focal deficits.   Skin: Skin is warm, dry and intact. Capillary refill takes less than 2 seconds. No ecchymosis, no petechiae and no rash noted. No erythema. No pallor.   Psychiatric: She has a normal mood and affect. Her speech is normal and behavior is normal. Judgment and thought content normal. Cognition and memory are normal.       ED Course   Procedures  Labs Reviewed   COMPREHENSIVE METABOLIC PANEL - Abnormal; Notable for the following components:       Result Value    Sodium 135 (*)     All other components within normal limits   URINALYSIS, REFLEX TO URINE CULTURE - Abnormal; Notable for the following components:    Glucose, UA Trace (*)     All other components within normal limits    Narrative:     Specimen Source->Urine   MAGNESIUM   CBC W/ AUTO DIFFERENTIAL   TROPONIN I HIGH SENSITIVITY   B-TYPE NATRIURETIC PEPTIDE   LIPASE   CK   MAGNESIUM   TSH   APTT   PROTIME-INR   PROTIME-INR   APTT   TROPONIN I HIGH SENSITIVITY   ISTAT CREATININE        ECG Results              EKG 12-lead (In process)  Result time 06/12/23 12:33:11      In process by Interface, Lab In [facility] (06/12/23 12:33:11)                   Narrative:    Test Reason : R20.9,    Vent. Rate : 070 BPM     Atrial Rate : 070 BPM     P-R Int : 140 ms          QRS Dur : 080 ms      QT Int : 406 ms       P-R-T Axes : 011 006 006 degrees     QTc Int : 438 ms    Normal sinus rhythm  Normal ECG  No previous ECGs available    Referred By: [email or web address]   SELF           Confirmed By:                                   Imaging Results              CTA Runoff ABD PEL Bilat Lower Ext (Final result)  Result time 06/12/23 15:11:24      Final result by Roger Berumen MD (06/12/23 15:11:24)                   Narrative:    CTA RUNOFF    CLINICAL DATA: Cold,  pulseless right foot, arterial embolism, lower extremity.    CMS MANDATED QUALITY DATA - CT RADIATION  436    All CT scans at this facility utilize dose modulation, iterative reconstruction, and/or weight based dosing when appropriate to reduce radiation dose to as low as reasonably achievable.    Findings: CT angiography of abdomen, pelvis, and bilateral lower extremities was performed. 100 mL nonionic contrast was administered. 3-D multiplanar reconstruction images were obtained, and stored as a part of the patient's permanent medical record.    No similar prior studies are currently available for comparison.    The abdominal aorta is normal in caliber with mild multifocal atherosclerotic calcification. Mesenteric and renal arteries are patent and within normal limits.    There is complete occlusion of the right common iliac artery immediately distal to its origin. There is reconstitution of flow to the common femoral artery on the right via a small abdominal wall collateral branches. There is 40% luminal diameter narrowing of the common femoral artery. The superficial femoral artery is normal in caliber. The popliteal artery is occluded at the level of the knee joint. Distal flow to small branches in the right calf is noted. Delayed images demonstrate evidence of minimal reconstituted flow to the anterior tibial artery.    The left common femoral artery is normal in caliber. The superficial femoral artery is patent and within normal limits. On the left, the proximal popliteal artery is patent and within normal limits. The anterior tibial artery is patent at its origin, with evidence of complete or near complete occlusion of the tibioperoneal trunk. The anterior tibial artery distal to its origin is threadlike in appearance. Delayed images demonstrate distal flow to small calf branches, with threadlike patent anterior tibial artery.    Incidentally noted is a left lower quadrant colostomy.    IMPRESSION:  1.  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  2. While the superficial femoral artery on the right is patent without high-grade stenosis, there is occlusion of the right popliteal artery, with flow to the right calf via a small intramuscular branches.  3. Occlusion of the tibioperoneal trunk on the left. The anterior tibial artery is patent at its origin, but becomes threadlike distal to that point. Flow to the left ankle is noted be a small intramuscular collaterals and a threadlike anterior tibial artery.    Electronically signed by:  Roger Berumen MD  6/12/2023 3:11 PM CDT Workstation: [id number]                                     X-Ray Chest AP Portable (Final result)  Result time 06/12/23 12:37:27      Final result by Irasema Abdalla MD (06/12/23 12:37:27)                   Narrative:    Portable chest x-ray at 12:20 PM    Clinical history is cold right foot    There is a right IJ Port-A-Cath with the tip overlying the superior vena cava. The cardiomediastinal silhouette is normal in size. The lungs are clear. There are no acute osseous abnormalities.    IMPRESSION: No acute pulmonary process    Electronically signed by:  Irasema Abdalla MD  6/12/2023 12:37 PM CDT Workstation: [id number]                                     Medications   heparin 25,000 units in dextrose 5% 250 mL (100 units/mL) infusion HIGH INTENSITY nomogram - [facility] (18 Units/kg/hr × 64 kg (Adjusted) Intravenous New Bag 6/12/23 1303)   heparin 25,000 units in dextrose 5% (100 units/ml) IV bolus from bag - ADDITIONAL PRN BOLUS - 60 units/kg (has no administration in time range)   heparin 25,000 units in dextrose 5% (100 units/ml) IV bolus from bag - ADDITIONAL PRN BOLUS - 30 units/kg (has no administration in time range)   morphine injection 2 mg (2 mg Intravenous Given 6/12/23 1206)   ondansetron injection 4 mg (4 mg Intravenous Given 6/12/23 1206)   heparin 25,000 units in dextrose 5% (100  units/ml) IV bolus from bag INITIAL BOLUS (5,000 Units Intravenous Bolus from Bag 6/12/23 1302)   iohexoL (OMNIPAQUE 350) injection 100 mL (100 mLs Intravenous Given 6/12/23 1432)     Medical Decision Making:   Clinical Tests:   Lab Tests: Ordered and Reviewed  Radiological Study: Ordered and Reviewed  Medical Tests: Ordered and Reviewed  ED Management:  Emergent evaluation of a 57-year-old female presenting with right lower extremity pain, exam consistent with vascular occlusion.  Case immediately discussed with Dr. Aleman--vascular surgery.  CTA with runoff ordered, heparin initiated.  Patient maintained NPO.  Morphine for pain administered with significant improvement in pain.  CTA has returned, case immediately discussed with Dr. Aleman, patient to be brought to cath lab shortly, plan of care discussed with the patient.  I have discussed the case with the hospitalist provider who has assumed care will admit.            Attending Attestation:         Attending Critical Care:   Critical Care Times:   Direct Patient Care (initial evaluation, reassessments, and time considering the case)................................................................14 minutes.   Additional History from reviewing old medical records or taking additional history from the family, EMS, PCP, etc.......................4 minutes.   Ordering, Reviewing, and Interpreting Diagnostic Studies...............................................................................................................7 minutes.   Documentation..................................................................................................................................................................................7 minutes.   Consultation with other Physicians. .................................................................................................................................................8 minutes.   Consultation with the patient's family  directly relating to the patient's condition, care, and DNR status (when patient unable)......6 minutes.   ==============================================================  Total Critical Care Time - exclusive of procedural time: 46 minutes.  ==============================================================                     Clinical Impression:   Final diagnoses:  [R20.9] Cold right foot  [I99.8] Vascular occlusion (Primary)        ED Disposition Condition    Admit Stable                Kiana Mckeon MD  06/12/23 2742

## 2023-06-13 ENCOUNTER — CLINICAL SUPPORT (OUTPATIENT)
Dept: CARDIOLOGY | Facility: HOSPITAL | Age: 58
DRG: 271 | End: 2023-06-13
Attending: INTERNAL MEDICINE
Payer: MEDICARE

## 2023-06-13 ENCOUNTER — ANESTHESIA EVENT (OUTPATIENT)
Dept: SURGERY | Facility: HOSPITAL | Age: 58
DRG: 271 | End: 2023-06-13
Payer: MEDICARE

## 2023-06-13 ENCOUNTER — ANESTHESIA (OUTPATIENT)
Dept: SURGERY | Facility: HOSPITAL | Age: 58
DRG: 271 | End: 2023-06-13
Payer: MEDICAID

## 2023-06-13 VITALS — WEIGHT: 186.94 LBS | HEIGHT: 62 IN | BODY MASS INDEX: 34.4 KG/M2

## 2023-06-13 LAB
ALBUMIN SERPL BCP-MCNC: 3.8 G/DL (ref 3.5–5.2)
ALP SERPL-CCNC: 69 U/L (ref 55–135)
ALT SERPL W/O P-5'-P-CCNC: 13 U/L (ref 10–44)
ANION GAP SERPL CALC-SCNC: 8 MMOL/L (ref 8–16)
AORTIC ROOT ANNULUS: 3.2 CM
AORTIC VALVE CUSP SEPERATION: 2 CM
APTT PPP: 33.4 SEC (ref 21–32)
ASCENDING AORTA: 3 CM
AST SERPL-CCNC: 24 U/L (ref 10–40)
AV INDEX (PROSTH): 0.86
AV MEAN GRADIENT: 3 MMHG
AV PEAK GRADIENT: 6 MMHG
AV VALVE AREA: 3.25 CM2
AV VELOCITY RATIO: 0.84
BASOPHILS # BLD AUTO: 0.03 K/UL (ref 0–0.2)
BASOPHILS NFR BLD: 0.6 % (ref 0–1.9)
BILIRUB SERPL-MCNC: 1.1 MG/DL (ref 0.1–1)
BSA FOR ECHO PROCEDURE: 1.93 M2
BUN SERPL-MCNC: 16 MG/DL (ref 6–20)
CALCIUM SERPL-MCNC: 8.8 MG/DL (ref 8.7–10.5)
CHLORIDE SERPL-SCNC: 103 MMOL/L (ref 95–110)
CO2 SERPL-SCNC: 27 MMOL/L (ref 23–29)
CREAT SERPL-MCNC: 1 MG/DL (ref 0.5–1.4)
CV ECHO LV RWT: 0.6 CM
DIFFERENTIAL METHOD: ABNORMAL
DOP CALC AO PEAK VEL: 1.23 M/S
DOP CALC AO VTI: 21.4 CM
DOP CALC LVOT AREA: 3.8 CM2
DOP CALC LVOT DIAMETER: 2.2 CM
DOP CALC LVOT PEAK VEL: 1.03 M/S
DOP CALC LVOT STROKE VOLUME: 69.53 CM3
DOP CALC MV VTI: 20.8 CM
DOP CALCLVOT PEAK VEL VTI: 18.3 CM
E WAVE DECELERATION TIME: 236 MSEC
E/A RATIO: 0.89
E/E' RATIO: 6 M/S
ECHO LV POSTERIOR WALL: 1.3 CM (ref 0.6–1.1)
EJECTION FRACTION: 65 %
EOSINOPHIL # BLD AUTO: 0.1 K/UL (ref 0–0.5)
EOSINOPHIL NFR BLD: 2.3 % (ref 0–8)
ERYTHROCYTE [DISTWIDTH] IN BLOOD BY AUTOMATED COUNT: 13.2 % (ref 11.5–14.5)
EST. GFR  (NO RACE VARIABLE): >60 ML/MIN/1.73 M^2
FIBRINOGEN PPP-MCNC: 530 MG/DL (ref 182–400)
FRACTIONAL SHORTENING: 27 % (ref 28–44)
GLUCOSE SERPL-MCNC: 132 MG/DL (ref 70–110)
GLUCOSE SERPL-MCNC: 173 MG/DL (ref 70–110)
HCO3 UR-SCNC: 27.3 MMOL/L (ref 24–28)
HCT VFR BLD AUTO: 36.9 % (ref 37–48.5)
HCT VFR BLD CALC: 30 %PCV (ref 36–54)
HGB BLD-MCNC: 12.1 G/DL (ref 12–16)
IMM GRANULOCYTES # BLD AUTO: 0.01 K/UL (ref 0–0.04)
IMM GRANULOCYTES NFR BLD AUTO: 0.2 % (ref 0–0.5)
INTERVENTRICULAR SEPTUM: 1.3 CM (ref 0.6–1.1)
IVC DIAMETER: 1.51 CM
IVRT: 92 MSEC
LEFT ATRIUM SIZE: 3.5 CM
LEFT ATRIUM VOLUME INDEX MOD: 19.9 ML/M2
LEFT ATRIUM VOLUME MOD: 37.1 CM3
LEFT INTERNAL DIMENSION IN SYSTOLE: 3.12 CM (ref 2.1–4)
LEFT VENTRICLE DIASTOLIC VOLUME INDEX: 44.68 ML/M2
LEFT VENTRICLE DIASTOLIC VOLUME: 83.1 ML
LEFT VENTRICLE MASS INDEX: 112 G/M2
LEFT VENTRICLE SYSTOLIC VOLUME INDEX: 20.7 ML/M2
LEFT VENTRICLE SYSTOLIC VOLUME: 38.5 ML
LEFT VENTRICULAR INTERNAL DIMENSION IN DIASTOLE: 4.3 CM (ref 3.5–6)
LEFT VENTRICULAR MASS: 207.77 G
LV LATERAL E/E' RATIO: 5.18 M/S
LV SEPTAL E/E' RATIO: 7.13 M/S
LVOT MG: 2 MMHG
LVOT MV: 0.64 CM/S
LYMPHOCYTES # BLD AUTO: 0.8 K/UL (ref 1–4.8)
LYMPHOCYTES NFR BLD: 16 % (ref 18–48)
MCH RBC QN AUTO: 29.6 PG (ref 27–31)
MCHC RBC AUTO-ENTMCNC: 32.8 G/DL (ref 32–36)
MCV RBC AUTO: 90 FL (ref 82–98)
MONOCYTES # BLD AUTO: 0.6 K/UL (ref 0.3–1)
MONOCYTES NFR BLD: 10.9 % (ref 4–15)
MV MEAN GRADIENT: 1 MMHG
MV PEAK A VEL: 0.64 M/S
MV PEAK E VEL: 0.57 M/S
MV PEAK GRADIENT: 2 MMHG
MV VALVE AREA BY CONTINUITY EQUATION: 3.34 CM2
NEUTROPHILS # BLD AUTO: 3.6 K/UL (ref 1.8–7.7)
NEUTROPHILS NFR BLD: 70 % (ref 38–73)
NRBC BLD-RTO: 0 /100 WBC
OHS LV EJECTION FRACTION SIMPSONS BIPLANE MOD: 6 %
PCO2 BLDA: 40.6 MMHG (ref 35–45)
PH SMN: 7.43 [PH] (ref 7.35–7.45)
PLATELET # BLD AUTO: 182 K/UL (ref 150–450)
PMV BLD AUTO: 10 FL (ref 9.2–12.9)
PO2 BLDA: 468 MMHG (ref 80–100)
POC ACTIVATED CLOTTING TIME K: 209 SEC (ref 74–137)
POC ACTIVATED CLOTTING TIME K: 311 SEC (ref 74–137)
POC ACTIVATED CLOTTING TIME K: 329 SEC (ref 74–137)
POC BE: 3 MMOL/L
POC IONIZED CALCIUM: 1.12 MMOL/L (ref 1.06–1.42)
POC SATURATED O2: 100 % (ref 95–100)
POC TCO2: 28 MMOL/L (ref 23–27)
POTASSIUM BLD-SCNC: 3.7 MMOL/L (ref 3.5–5.1)
POTASSIUM SERPL-SCNC: 4.2 MMOL/L (ref 3.5–5.1)
PROT SERPL-MCNC: 6.6 G/DL (ref 6–8.4)
PV MV: 0.75 M/S
PV PEAK VELOCITY: 1.12 CM/S
RBC # BLD AUTO: 4.09 M/UL (ref 4–5.4)
RIGHT VENTRICULAR END-DIASTOLIC DIMENSION: 2.19 CM
RV TISSUE DOPPLER FREE WALL SYSTOLIC VELOCITY 1 (APICAL 4 CHAMBER VIEW): 0.01 CM/S
SAMPLE: ABNORMAL
SODIUM BLD-SCNC: 135 MMOL/L (ref 136–145)
SODIUM SERPL-SCNC: 138 MMOL/L (ref 136–145)
TDI LATERAL: 0.11 M/S
TDI SEPTAL: 0.08 M/S
TDI: 0.1 M/S
TRICUSPID ANNULAR PLANE SYSTOLIC EXCURSION: 1.88 CM
WBC # BLD AUTO: 5.14 K/UL (ref 3.9–12.7)

## 2023-06-13 PROCEDURE — 27000673 HC TUBING BLOOD Y: Performed by: ANESTHESIOLOGY

## 2023-06-13 PROCEDURE — 37214 CESSJ THERAPY CATH REMOVAL: CPT | Performed by: SURGERY

## 2023-06-13 PROCEDURE — 80053 COMPREHEN METABOLIC PANEL: CPT | Performed by: INTERNAL MEDICINE

## 2023-06-13 PROCEDURE — 85730 THROMBOPLASTIN TIME PARTIAL: CPT | Performed by: INTERNAL MEDICINE

## 2023-06-13 PROCEDURE — 37000008 HC ANESTHESIA 1ST 15 MINUTES: Performed by: SURGERY

## 2023-06-13 PROCEDURE — 99900031 HC PATIENT EDUCATION (STAT)

## 2023-06-13 PROCEDURE — D9220A PRA ANESTHESIA: Mod: CRNA,,, | Performed by: NURSE ANESTHETIST, CERTIFIED REGISTERED

## 2023-06-13 PROCEDURE — D9220A PRA ANESTHESIA: Mod: ANES,,, | Performed by: ANESTHESIOLOGY

## 2023-06-13 PROCEDURE — 85025 COMPLETE CBC W/AUTO DIFF WBC: CPT | Performed by: EMERGENCY MEDICINE

## 2023-06-13 PROCEDURE — 63600175 PHARM REV CODE 636 W HCPCS: Performed by: SURGERY

## 2023-06-13 PROCEDURE — 27000665 HC PILLOW ARTERIAL SUPPORT: Performed by: ANESTHESIOLOGY

## 2023-06-13 PROCEDURE — 93306 TTE W/DOPPLER COMPLETE: CPT | Mod: 26,,, | Performed by: INTERNAL MEDICINE

## 2023-06-13 PROCEDURE — 25000003 PHARM REV CODE 250

## 2023-06-13 PROCEDURE — 27000221 HC OXYGEN, UP TO 24 HOURS

## 2023-06-13 PROCEDURE — 36000709 HC OR TIME LEV III EA ADD 15 MIN: Performed by: SURGERY

## 2023-06-13 PROCEDURE — C1894 INTRO/SHEATH, NON-LASER: HCPCS | Performed by: SURGERY

## 2023-06-13 PROCEDURE — 51702 INSERT TEMP BLADDER CATH: CPT

## 2023-06-13 PROCEDURE — 63600175 PHARM REV CODE 636 W HCPCS: Performed by: NURSE ANESTHETIST, CERTIFIED REGISTERED

## 2023-06-13 PROCEDURE — 93306 ECHO (CUPID ONLY): ICD-10-PCS | Mod: 26,,, | Performed by: INTERNAL MEDICINE

## 2023-06-13 PROCEDURE — 25000003 PHARM REV CODE 250: Performed by: NURSE ANESTHETIST, CERTIFIED REGISTERED

## 2023-06-13 PROCEDURE — 25000003 PHARM REV CODE 250: Performed by: SURGERY

## 2023-06-13 PROCEDURE — 75710 ARTERY X-RAYS ARM/LEG: CPT | Mod: XU,RT | Performed by: SURGERY

## 2023-06-13 PROCEDURE — 27201107 HC STYLET, STANDARD: Performed by: ANESTHESIOLOGY

## 2023-06-13 PROCEDURE — C1887 CATHETER, GUIDING: HCPCS | Performed by: SURGERY

## 2023-06-13 PROCEDURE — 36415 COLL VENOUS BLD VENIPUNCTURE: CPT | Performed by: INTERNAL MEDICINE

## 2023-06-13 PROCEDURE — 27200677 HC TRANSDUCER MONITOR KIT SINGLE: Performed by: ANESTHESIOLOGY

## 2023-06-13 PROCEDURE — 36247 INS CATH ABD/L-EXT ART 3RD: CPT | Mod: RT | Performed by: SURGERY

## 2023-06-13 PROCEDURE — D9220A PRA ANESTHESIA: ICD-10-PCS | Mod: ANES,,, | Performed by: ANESTHESIOLOGY

## 2023-06-13 PROCEDURE — 93306 TTE W/DOPPLER COMPLETE: CPT

## 2023-06-13 PROCEDURE — 85384 FIBRINOGEN ACTIVITY: CPT | Performed by: SURGERY

## 2023-06-13 PROCEDURE — 36620 INSERTION CATHETER ARTERY: CPT

## 2023-06-13 PROCEDURE — 27000658 HC ARTERIAL LINE ALL: Performed by: ANESTHESIOLOGY

## 2023-06-13 PROCEDURE — 36620 ARTERIAL: ICD-10-PCS | Mod: 59,,, | Performed by: ANESTHESIOLOGY

## 2023-06-13 PROCEDURE — 27000675 HC TUBING MICRODRIP: Performed by: ANESTHESIOLOGY

## 2023-06-13 PROCEDURE — 94761 N-INVAS EAR/PLS OXIMETRY MLT: CPT

## 2023-06-13 PROCEDURE — 36415 COLL VENOUS BLD VENIPUNCTURE: CPT | Performed by: EMERGENCY MEDICINE

## 2023-06-13 PROCEDURE — 36415 COLL VENOUS BLD VENIPUNCTURE: CPT | Performed by: SURGERY

## 2023-06-13 PROCEDURE — C1769 GUIDE WIRE: HCPCS | Performed by: SURGERY

## 2023-06-13 PROCEDURE — 37000009 HC ANESTHESIA EA ADD 15 MINS: Performed by: SURGERY

## 2023-06-13 PROCEDURE — 36000708 HC OR TIME LEV III 1ST 15 MIN: Performed by: SURGERY

## 2023-06-13 PROCEDURE — 20000000 HC ICU ROOM

## 2023-06-13 PROCEDURE — 27000666 HC INFUSOR PRESSURE BAG: Performed by: ANESTHESIOLOGY

## 2023-06-13 PROCEDURE — 25000003 PHARM REV CODE 250: Performed by: INTERNAL MEDICINE

## 2023-06-13 PROCEDURE — 99152 MOD SED SAME PHYS/QHP 5/>YRS: CPT | Performed by: SURGERY

## 2023-06-13 PROCEDURE — P9045 ALBUMIN (HUMAN), 5%, 250 ML: HCPCS | Mod: JZ,JG | Performed by: NURSE ANESTHETIST, CERTIFIED REGISTERED

## 2023-06-13 PROCEDURE — 36620 INSERTION CATHETER ARTERY: CPT | Mod: 59,,, | Performed by: ANESTHESIOLOGY

## 2023-06-13 PROCEDURE — 63600175 PHARM REV CODE 636 W HCPCS: Mod: JZ,JG | Performed by: INTERNAL MEDICINE

## 2023-06-13 PROCEDURE — 27201423 OPTIME MED/SURG SUP & DEVICES STERILE SUPPLY: Performed by: SURGERY

## 2023-06-13 PROCEDURE — D9220A PRA ANESTHESIA: ICD-10-PCS | Mod: CRNA,,, | Performed by: NURSE ANESTHETIST, CERTIFIED REGISTERED

## 2023-06-13 PROCEDURE — 27202107 HC XP QUATRO SENSOR: Performed by: ANESTHESIOLOGY

## 2023-06-13 PROCEDURE — 25500020 PHARM REV CODE 255: Performed by: SURGERY

## 2023-06-13 PROCEDURE — C1757 CATH, THROMBECTOMY/EMBOLECT: HCPCS | Performed by: SURGERY

## 2023-06-13 RX ORDER — HEPARIN SODIUM 5000 [USP'U]/ML
INJECTION, SOLUTION INTRAVENOUS; SUBCUTANEOUS
Status: DISCONTINUED | OUTPATIENT
Start: 2023-06-13 | End: 2023-06-13 | Stop reason: HOSPADM

## 2023-06-13 RX ORDER — DEXAMETHASONE SODIUM PHOSPHATE 4 MG/ML
INJECTION, SOLUTION INTRA-ARTICULAR; INTRALESIONAL; INTRAMUSCULAR; INTRAVENOUS; SOFT TISSUE
Status: DISCONTINUED | OUTPATIENT
Start: 2023-06-13 | End: 2023-06-13

## 2023-06-13 RX ORDER — OXYCODONE AND ACETAMINOPHEN 10; 325 MG/1; MG/1
1 TABLET ORAL EVERY 4 HOURS PRN
Status: DISCONTINUED | OUTPATIENT
Start: 2023-06-13 | End: 2023-06-15

## 2023-06-13 RX ORDER — DIPHENHYDRAMINE HYDROCHLORIDE 50 MG/ML
12.5 INJECTION INTRAMUSCULAR; INTRAVENOUS ONCE AS NEEDED
Status: DISCONTINUED | OUTPATIENT
Start: 2023-06-13 | End: 2023-06-13 | Stop reason: HOSPADM

## 2023-06-13 RX ORDER — CEFAZOLIN SODIUM 1 G/3ML
INJECTION, POWDER, FOR SOLUTION INTRAMUSCULAR; INTRAVENOUS
Status: DISCONTINUED | OUTPATIENT
Start: 2023-06-13 | End: 2023-06-13 | Stop reason: HOSPADM

## 2023-06-13 RX ORDER — FENTANYL CITRATE 50 UG/ML
INJECTION, SOLUTION INTRAMUSCULAR; INTRAVENOUS
Status: DISCONTINUED | OUTPATIENT
Start: 2023-06-13 | End: 2023-06-13 | Stop reason: HOSPADM

## 2023-06-13 RX ORDER — ALBUMIN HUMAN 50 G/1000ML
SOLUTION INTRAVENOUS CONTINUOUS PRN
Status: DISCONTINUED | OUTPATIENT
Start: 2023-06-13 | End: 2023-06-13

## 2023-06-13 RX ORDER — ONDANSETRON 2 MG/ML
INJECTION INTRAMUSCULAR; INTRAVENOUS
Status: DISCONTINUED | OUTPATIENT
Start: 2023-06-13 | End: 2023-06-13

## 2023-06-13 RX ORDER — SODIUM CHLORIDE 9 MG/ML
INJECTION, SOLUTION INTRAVENOUS CONTINUOUS
Status: DISCONTINUED | OUTPATIENT
Start: 2023-06-13 | End: 2023-06-18

## 2023-06-13 RX ORDER — MIDAZOLAM HYDROCHLORIDE 1 MG/ML
INJECTION INTRAMUSCULAR; INTRAVENOUS
Status: DISCONTINUED | OUTPATIENT
Start: 2023-06-13 | End: 2023-06-13

## 2023-06-13 RX ORDER — IBUPROFEN 200 MG
24 TABLET ORAL
Status: DISCONTINUED | OUTPATIENT
Start: 2023-06-13 | End: 2023-06-21 | Stop reason: HOSPADM

## 2023-06-13 RX ORDER — FAMOTIDINE 10 MG/ML
INJECTION INTRAVENOUS
Status: DISCONTINUED | OUTPATIENT
Start: 2023-06-13 | End: 2023-06-13

## 2023-06-13 RX ORDER — SIMETHICONE 80 MG
1 TABLET,CHEWABLE ORAL 4 TIMES DAILY PRN
Status: DISCONTINUED | OUTPATIENT
Start: 2023-06-13 | End: 2023-06-21 | Stop reason: HOSPADM

## 2023-06-13 RX ORDER — ACETAMINOPHEN 10 MG/ML
INJECTION, SOLUTION INTRAVENOUS
Status: DISCONTINUED | OUTPATIENT
Start: 2023-06-13 | End: 2023-06-13

## 2023-06-13 RX ORDER — MIDAZOLAM HYDROCHLORIDE 1 MG/ML
INJECTION INTRAMUSCULAR; INTRAVENOUS
Status: DISCONTINUED | OUTPATIENT
Start: 2023-06-13 | End: 2023-06-13 | Stop reason: HOSPADM

## 2023-06-13 RX ORDER — GLUCAGON 1 MG
1 KIT INJECTION
Status: DISCONTINUED | OUTPATIENT
Start: 2023-06-13 | End: 2023-06-21 | Stop reason: HOSPADM

## 2023-06-13 RX ORDER — PHENYLEPHRINE HYDROCHLORIDE 10 MG/ML
INJECTION INTRAVENOUS
Status: DISCONTINUED | OUTPATIENT
Start: 2023-06-13 | End: 2023-06-13

## 2023-06-13 RX ORDER — NALOXONE HCL 0.4 MG/ML
0.02 VIAL (ML) INJECTION
Status: DISCONTINUED | OUTPATIENT
Start: 2023-06-13 | End: 2023-06-21 | Stop reason: HOSPADM

## 2023-06-13 RX ORDER — KETAMINE HYDROCHLORIDE 10 MG/ML
INJECTION, SOLUTION INTRAMUSCULAR; INTRAVENOUS
Status: DISCONTINUED | OUTPATIENT
Start: 2023-06-13 | End: 2023-06-13

## 2023-06-13 RX ORDER — MAG HYDROX/ALUMINUM HYD/SIMETH 200-200-20
30 SUSPENSION, ORAL (FINAL DOSE FORM) ORAL 4 TIMES DAILY PRN
Status: DISCONTINUED | OUTPATIENT
Start: 2023-06-13 | End: 2023-06-21 | Stop reason: HOSPADM

## 2023-06-13 RX ORDER — LIDOCAINE HYDROCHLORIDE 20 MG/ML
INJECTION, SOLUTION EPIDURAL; INFILTRATION; INTRACAUDAL; PERINEURAL
Status: DISCONTINUED | OUTPATIENT
Start: 2023-06-13 | End: 2023-06-13

## 2023-06-13 RX ORDER — FENTANYL CITRATE 50 UG/ML
INJECTION, SOLUTION INTRAMUSCULAR; INTRAVENOUS
Status: DISCONTINUED | OUTPATIENT
Start: 2023-06-13 | End: 2023-06-13

## 2023-06-13 RX ORDER — ONDANSETRON 2 MG/ML
4 INJECTION INTRAMUSCULAR; INTRAVENOUS DAILY PRN
Status: DISCONTINUED | OUTPATIENT
Start: 2023-06-13 | End: 2023-06-13 | Stop reason: HOSPADM

## 2023-06-13 RX ORDER — MUPIROCIN 20 MG/G
OINTMENT TOPICAL 2 TIMES DAILY
Status: COMPLETED | OUTPATIENT
Start: 2023-06-13 | End: 2023-06-17

## 2023-06-13 RX ORDER — ONDANSETRON 2 MG/ML
4 INJECTION INTRAMUSCULAR; INTRAVENOUS EVERY 6 HOURS PRN
Status: DISCONTINUED | OUTPATIENT
Start: 2023-06-13 | End: 2023-06-21 | Stop reason: HOSPADM

## 2023-06-13 RX ORDER — PROPOFOL 10 MG/ML
VIAL (ML) INTRAVENOUS
Status: DISCONTINUED | OUTPATIENT
Start: 2023-06-13 | End: 2023-06-13

## 2023-06-13 RX ORDER — HYDROMORPHONE HYDROCHLORIDE 1 MG/ML
1 INJECTION, SOLUTION INTRAMUSCULAR; INTRAVENOUS; SUBCUTANEOUS EVERY 4 HOURS PRN
Status: DISCONTINUED | OUTPATIENT
Start: 2023-06-13 | End: 2023-06-14

## 2023-06-13 RX ORDER — ROCURONIUM BROMIDE 10 MG/ML
INJECTION, SOLUTION INTRAVENOUS
Status: DISCONTINUED | OUTPATIENT
Start: 2023-06-13 | End: 2023-06-13

## 2023-06-13 RX ORDER — PROCHLORPERAZINE EDISYLATE 5 MG/ML
5 INJECTION INTRAMUSCULAR; INTRAVENOUS EVERY 6 HOURS PRN
Status: DISCONTINUED | OUTPATIENT
Start: 2023-06-13 | End: 2023-06-21 | Stop reason: HOSPADM

## 2023-06-13 RX ORDER — OXYCODONE HYDROCHLORIDE 5 MG/1
5 TABLET ORAL
Status: DISCONTINUED | OUTPATIENT
Start: 2023-06-13 | End: 2023-06-13 | Stop reason: HOSPADM

## 2023-06-13 RX ORDER — ACETAMINOPHEN 500 MG
1000 TABLET ORAL EVERY 8 HOURS PRN
Status: DISCONTINUED | OUTPATIENT
Start: 2023-06-13 | End: 2023-06-21 | Stop reason: HOSPADM

## 2023-06-13 RX ORDER — ACETAMINOPHEN 325 MG/1
650 TABLET ORAL EVERY 4 HOURS PRN
Status: DISCONTINUED | OUTPATIENT
Start: 2023-06-13 | End: 2023-06-21 | Stop reason: HOSPADM

## 2023-06-13 RX ORDER — HYDROMORPHONE HYDROCHLORIDE 1 MG/ML
0.2 INJECTION, SOLUTION INTRAMUSCULAR; INTRAVENOUS; SUBCUTANEOUS EVERY 5 MIN PRN
Status: DISCONTINUED | OUTPATIENT
Start: 2023-06-13 | End: 2023-06-13 | Stop reason: HOSPADM

## 2023-06-13 RX ORDER — HEPARIN SODIUM 10000 [USP'U]/ML
INJECTION, SOLUTION INTRAVENOUS; SUBCUTANEOUS
Status: DISCONTINUED | OUTPATIENT
Start: 2023-06-13 | End: 2023-06-13

## 2023-06-13 RX ORDER — PREGABALIN 75 MG/1
150 CAPSULE ORAL 2 TIMES DAILY
Status: DISCONTINUED | OUTPATIENT
Start: 2023-06-13 | End: 2023-06-21 | Stop reason: HOSPADM

## 2023-06-13 RX ORDER — MORPHINE SULFATE 4 MG/ML
4 INJECTION, SOLUTION INTRAMUSCULAR; INTRAVENOUS EVERY 4 HOURS PRN
Status: DISCONTINUED | OUTPATIENT
Start: 2023-06-13 | End: 2023-06-13

## 2023-06-13 RX ORDER — CEFAZOLIN SODIUM 1 G/3ML
INJECTION, POWDER, FOR SOLUTION INTRAMUSCULAR; INTRAVENOUS
Status: DISCONTINUED | OUTPATIENT
Start: 2023-06-13 | End: 2023-06-13

## 2023-06-13 RX ORDER — SODIUM CHLORIDE 9 MG/ML
INJECTION, SOLUTION INTRAVENOUS CONTINUOUS
Status: DISCONTINUED | OUTPATIENT
Start: 2023-06-13 | End: 2023-06-13

## 2023-06-13 RX ORDER — PAPAVERINE HYDROCHLORIDE 30 MG/ML
INJECTION INTRAMUSCULAR; INTRAVENOUS
Status: DISCONTINUED | OUTPATIENT
Start: 2023-06-13 | End: 2023-06-13 | Stop reason: HOSPADM

## 2023-06-13 RX ORDER — SUCCINYLCHOLINE CHLORIDE 20 MG/ML
INJECTION INTRAMUSCULAR; INTRAVENOUS
Status: DISCONTINUED | OUTPATIENT
Start: 2023-06-13 | End: 2023-06-13

## 2023-06-13 RX ORDER — CHLORHEXIDINE GLUCONATE ORAL RINSE 1.2 MG/ML
15 SOLUTION DENTAL 2 TIMES DAILY
Status: DISPENSED | OUTPATIENT
Start: 2023-06-13 | End: 2023-06-18

## 2023-06-13 RX ORDER — TALC
9 POWDER (GRAM) TOPICAL NIGHTLY PRN
Status: DISCONTINUED | OUTPATIENT
Start: 2023-06-13 | End: 2023-06-21 | Stop reason: HOSPADM

## 2023-06-13 RX ORDER — SODIUM CHLORIDE 0.9 % (FLUSH) 0.9 %
10 SYRINGE (ML) INJECTION EVERY 6 HOURS PRN
Status: DISCONTINUED | OUTPATIENT
Start: 2023-06-13 | End: 2023-06-21 | Stop reason: HOSPADM

## 2023-06-13 RX ORDER — PREGABALIN 75 MG/1
75 CAPSULE ORAL 2 TIMES DAILY
Status: DISCONTINUED | OUTPATIENT
Start: 2023-06-13 | End: 2023-06-13

## 2023-06-13 RX ORDER — IBUPROFEN 200 MG
16 TABLET ORAL
Status: DISCONTINUED | OUTPATIENT
Start: 2023-06-13 | End: 2023-06-21 | Stop reason: HOSPADM

## 2023-06-13 RX ORDER — HEPARIN SODIUM 10000 [USP'U]/100ML
500 INJECTION, SOLUTION INTRAVENOUS CONTINUOUS
Status: DISCONTINUED | OUTPATIENT
Start: 2023-06-13 | End: 2023-06-13

## 2023-06-13 RX ADMIN — SODIUM CHLORIDE, SODIUM LACTATE, POTASSIUM CHLORIDE, AND CALCIUM CHLORIDE: .6; .31; .03; .02 INJECTION, SOLUTION INTRAVENOUS at 03:06

## 2023-06-13 RX ADMIN — LIDOCAINE HYDROCHLORIDE 50 MG: 20 INJECTION, SOLUTION INTRAVENOUS at 04:06

## 2023-06-13 RX ADMIN — CEFAZOLIN 2 G: 330 INJECTION, POWDER, FOR SOLUTION INTRAMUSCULAR; INTRAVENOUS at 08:06

## 2023-06-13 RX ADMIN — KETAMINE HYDROCHLORIDE 20 MG: 10 INJECTION, SOLUTION INTRAMUSCULAR; INTRAVENOUS at 05:06

## 2023-06-13 RX ADMIN — HYDROMORPHONE HYDROCHLORIDE 1 MG: 1 INJECTION, SOLUTION INTRAMUSCULAR; INTRAVENOUS; SUBCUTANEOUS at 10:06

## 2023-06-13 RX ADMIN — PHENYLEPHRINE HYDROCHLORIDE 100 MCG: 10 INJECTION INTRAVENOUS at 04:06

## 2023-06-13 RX ADMIN — ROCURONIUM BROMIDE 5 MG: 10 INJECTION, SOLUTION INTRAVENOUS at 04:06

## 2023-06-13 RX ADMIN — FENTANYL CITRATE 25 MCG: 50 INJECTION, SOLUTION INTRAMUSCULAR; INTRAVENOUS at 07:06

## 2023-06-13 RX ADMIN — HEPARIN SODIUM 5000 UNITS: 10000 INJECTION, SOLUTION INTRAVENOUS; SUBCUTANEOUS at 08:06

## 2023-06-13 RX ADMIN — MIDAZOLAM HYDROCHLORIDE 2 MG: 1 INJECTION, SOLUTION INTRAMUSCULAR; INTRAVENOUS at 08:06

## 2023-06-13 RX ADMIN — ALBUMIN (HUMAN): 12.5 INJECTION, SOLUTION INTRAVENOUS at 07:06

## 2023-06-13 RX ADMIN — SODIUM CHLORIDE: 0.9 INJECTION, SOLUTION INTRAVENOUS at 07:06

## 2023-06-13 RX ADMIN — OXYCODONE HYDROCHLORIDE AND ACETAMINOPHEN 1 TABLET: 10; 325 TABLET ORAL at 10:06

## 2023-06-13 RX ADMIN — FENTANYL CITRATE 25 MCG: 50 INJECTION, SOLUTION INTRAMUSCULAR; INTRAVENOUS at 12:06

## 2023-06-13 RX ADMIN — ACETAMINOPHEN 500 MG: 10 INJECTION, SOLUTION INTRAVENOUS at 04:06

## 2023-06-13 RX ADMIN — HEPARIN SODIUM 500 UNITS/HR: 10000 INJECTION, SOLUTION INTRAVENOUS at 07:06

## 2023-06-13 RX ADMIN — CEFAZOLIN 2 G: 330 INJECTION, POWDER, FOR SOLUTION INTRAMUSCULAR; INTRAVENOUS at 04:06

## 2023-06-13 RX ADMIN — Medication 120 MG: at 04:06

## 2023-06-13 RX ADMIN — HEPARIN SODIUM 5000 UNITS: 10000 INJECTION, SOLUTION INTRAVENOUS; SUBCUTANEOUS at 04:06

## 2023-06-13 RX ADMIN — SODIUM CHLORIDE: 0.9 INJECTION, SOLUTION INTRAVENOUS at 09:06

## 2023-06-13 RX ADMIN — CHLORHEXIDINE GLUCONATE 15 ML: 1.2 RINSE ORAL at 10:06

## 2023-06-13 RX ADMIN — ONDANSETRON 4 MG: 2 INJECTION INTRAMUSCULAR; INTRAVENOUS at 04:06

## 2023-06-13 RX ADMIN — PHENYLEPHRINE HYDROCHLORIDE 20 MCG/KG/MIN: 10 INJECTION INTRAVENOUS at 05:06

## 2023-06-13 RX ADMIN — MUPIROCIN 1 G: 20 OINTMENT TOPICAL at 10:06

## 2023-06-13 RX ADMIN — KETAMINE HYDROCHLORIDE 10 MG: 10 INJECTION, SOLUTION INTRAMUSCULAR; INTRAVENOUS at 07:06

## 2023-06-13 RX ADMIN — HEPARIN SODIUM 7500 UNITS: 10000 INJECTION, SOLUTION INTRAVENOUS; SUBCUTANEOUS at 06:06

## 2023-06-13 RX ADMIN — DEXAMETHASONE SODIUM PHOSPHATE 8 MG: 4 INJECTION, SOLUTION INTRAMUSCULAR; INTRAVENOUS at 04:06

## 2023-06-13 RX ADMIN — FAMOTIDINE 20 MG: 10 INJECTION, SOLUTION INTRAVENOUS at 04:06

## 2023-06-13 RX ADMIN — PROPOFOL 100 MG: 10 INJECTION, EMULSION INTRAVENOUS at 04:06

## 2023-06-13 RX ADMIN — PREGABALIN 150 MG: 75 CAPSULE ORAL at 10:06

## 2023-06-13 RX ADMIN — FENTANYL CITRATE 50 MCG: 50 INJECTION INTRAMUSCULAR; INTRAVENOUS at 04:06

## 2023-06-13 RX ADMIN — ALTEPLASE: KIT at 08:06

## 2023-06-13 RX ADMIN — FENTANYL CITRATE 25 MCG: 50 INJECTION, SOLUTION INTRAMUSCULAR; INTRAVENOUS at 10:06

## 2023-06-13 NOTE — NURSING
Pt R foot and toes noted to be be dusky in appearance as compared to previous assessment. Toes and foot are cold. Unable to doppler PT pulse at this time. Dr. Aleman notified and aware. States to make sure we are continuing TPA through sheath, which is currently infusing at 10 ml/hr (0.5 mg/hr).

## 2023-06-13 NOTE — ANESTHESIA PROCEDURE NOTES
Arterial    Diagnosis: Peripheral vascular disease, ischemic right leg.    Patient location during procedure: ICU  Procedure start time: 6/13/2023 4:21 PM  Timeout: 6/13/2023 4:20 PM  Procedure end time: 6/13/2023 4:24 PM    Staffing  Authorizing Provider: Gustavo Duarte MD  Performing Provider: Gustavo Duarte MD    Anesthesiologist was present at the time of the procedure.    Preanesthetic Checklist  Completed: patient identified, IV checked, site marked, risks and benefits discussed, surgical consent, monitors and equipment checked, pre-op evaluation, timeout performed and anesthesia consent givenArterial  Skin Prep: chlorhexidine gluconate  Local Infiltration: lidocaine (1%)  Orientation: left  Location: radial    Catheter Size: 20 G  Catheter placement by Ultrasound guidance. Heme positive aspiration all ports.   Vessel Caliber: medium, patent, compressibility normal  Vascular Doppler:  not done  Needle advanced into vessel with real time Ultrasound guidance.  Sterile sheath used.Insertion Attempts: 1  Assessment  Dressing: secured with tape and tegaderm, sutured in place and taped and steri-strips  Patient: Tolerated well

## 2023-06-13 NOTE — INTERVAL H&P NOTE
The patient has been examined and the H&P has been reviewed:    I concur with the findings and no changes have occurred since H&P was written.    Surgery risks, benefits and alternative options discussed and understood by patient/family.          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

## 2023-06-13 NOTE — PROGRESS NOTES
"[facility] Medicine Progress Note  Patient Name: Carine Darden MRN: [id number]   Patient Class: IP- Inpatient  Length of Stay: 1   Admission Date: 6/12/2023 11:06 AM Attending Physician: Jasmeet Balbuena MD   Primary Care Provider: Primary Doctor No Face-to-Face encounter date: 06/13/2023   Chief Complaint: Leg Pain (Pt R foot is cool to the touch. 10/10 pain stated by pt. Started 9pm last night. Pt states "numbness and tingling" and hip pain. )      Subjective:    Interval History   Reports severe pain on the heel, worst than yesterday  Current pain regimen only helping her slightly with the pain and requesting stronger pain medications  Denies chest pain, shortness of breath, palpitations, abdominal pain, nausea/vomiting.   No concerns/issues overnight reported by the patient or the nursing staff.  Reviewed the labs and discussed the plan of care.   No family present at bedside.     Review of Systems   All other Review of Systems were found to be negative expect for that mentioned already in HPI.     Hospital Course     06/13/2023     chlorhexidine  15 mL Mouth/Throat BID    mupirocin   Nasal BID    pregabalin  150 mg Oral BID       acetaminophen, acetaminophen, alteplase, aluminum-magnesium hydroxide-simethicone, dextrose 50%, dextrose 50%, fentaNYL, glucagon (human recombinant), glucose, glucose, heparin (porcine) in 5 % dex, melatonin, morphine, naloxone, ondansetron, oxyCODONE-acetaminophen, prochlorperazine, simethicone, sodium chloride 0.9%      Objective:   Physical Exam  BP (!) 96/55   Pulse 83   Temp 98.5 °F (36.9 °C) (Oral)   Resp (!) 26   Ht 5' 2" (1.575 m)   Wt 84.8 kg (186 lb 15.2 oz)   SpO2 (!) 92%   BMI 34.19 kg/m²   Constitutional: No distress.   HENT: Atraumatic.   Cardiovascular: Normal rate, regular rhythm and normal heart sounds.   Pulmonary/Chest: Effort normal. Clear to auscultation bilaterally. No wheezes.   Abdominal: Soft. Bowel sounds are normal. Exhibits no " distension and no mass. No tenderness  Neurological: Alert.   Skin: Skin is warm and dry.     Labs and Imaging    Significant Labs: All pertinent labs within the past 24 hours have been reviewed.    Significant Imaging: I have reviewed all pertinent imaging results/findings within the past 24 hours.    I have reviewed the Vitals, labs and imaging as above.     Assessment & Plan:   Carine Darden is a 57 y.o. female admitted for    Active Hospital Problems    Diagnosis    *Critical limb ischemia of right lower extremity    Colostomy status    Essential (primary) hypertension    Radiation colitis    Malignant neoplasm of cervix       Plan  Continue TPA and heparin as per vascular surgery  Increased lyrica to 150mg BID   Continue fentanyl and oxycodone      Active PT: No  Active OT: No  Active SLP: No    Core measures:  - Code status:   - Diet: Cardiac  VTE Risk Mitigation (From admission, onward)           Ordered     heparin 25,000 units in dextrose 5% 250 mL (100 units/mL) infusion (heparin infusion - NO NOMOGRAM)  Continuous         06/13/23 0705     IP VTE HIGH RISK PATIENT  Once         06/13/23 0705     Place sequential compression device  Until discontinued         06/13/23 0705     Reason for No Pharmacological VTE Prophylaxis  Once        Question:  Reasons:  Answer:  IV Heparin w/in 24 hrs. Pre or Post-Op    06/13/23 0705     heparin 25,000 units in dextrose 5% 250 mL (100 units/mL) infusion (heparin infusion - NO NOMOGRAM)  Intra-op continuous PRN         06/12/23 0237                    Discharge Planning:   Discharge Planning   SHONDA: 06/15    Code Status: Full Code   Is the patient medically ready for discharge?: no    Reason for patient still in hospital (select all that apply): Patient trending condition  Discharge Plan A: Home with assistance from family        Above encounter included review of the medical records, interviewing and examining the patient face-to-face, discussion with family and other  health care providers, ordering and interpreting lab/test results and formulating a plan of care.     Medical Decision Making:  [] Low Complexity  [] Moderate Complexity  [x] High Complexity    Jasmeet Balbuena MD  [facility] Hospitalist  06/13/2023

## 2023-06-13 NOTE — CARE UPDATE
06/12/23 2000   Patient Assessment/Suction   Level of Consciousness (AVPU) alert   Respiratory Effort Unlabored   Expansion/Accessory Muscles/Retractions expansion symmetric   All Lung Fields Breath Sounds coarse   Rhythm/Pattern, Respiratory depth regular;pattern regular   Cough Frequency infrequent   Cough Type good;nonproductive   PRE-TX-O2   SpO2 99 %   Pulse 71   Resp 12   BP (!) 143/71   Education   $ Education DME Oxygen;15 min

## 2023-06-13 NOTE — ANESTHESIA PREPROCEDURE EVALUATION
06/13/2023  Carine Darden is a 57 y.o., female.    Patient Active Problem List   Diagnosis    Colostomy status    Essential (primary) hypertension    Malignant neoplasm of cervix    Radiation colitis    Critical limb ischemia of right lower extremity       No past surgical history on file.     Tobacco Use:  The patient  has no history on file for tobacco use.     Results for orders placed or performed during the hospital encounter of 06/12/23   EKG 12-lead    Collection Time: 06/12/23 12:29 PM    Narrative    Test Reason : R20.9,    Vent. Rate : 070 BPM     Atrial Rate : 070 BPM     P-R Int : 140 ms          QRS Dur : 080 ms      QT Int : 406 ms       P-R-T Axes : 011 006 006 degrees     QTc Int : 438 ms    Normal sinus rhythm  Normal ECG  No previous ECGs available    Referred By: [email or web address]   SELF           Confirmed By:         Imaging Results          CTA Runoff ABD PEL Bilat Lower Ext (Final result)  Result time 06/12/23 15:11:24    Final result by Roger Beruemn MD (06/12/23 15:11:24)                 Narrative:    CTA RUNOFF    CLINICAL DATA: Cold, pulseless right foot, arterial embolism, lower extremity.    CMS MANDATED QUALITY DATA - CT RADIATION  436    All CT scans at this facility utilize dose modulation, iterative reconstruction, and/or weight based dosing when appropriate to reduce radiation dose to as low as reasonably achievable.    Findings: CT angiography of abdomen, pelvis, and bilateral lower extremities was performed. 100 mL nonionic contrast was administered. 3-D multiplanar reconstruction images were obtained, and stored as a part of the patient's permanent medical record.    No similar prior studies are currently available for comparison.    The abdominal aorta is normal in caliber with mild multifocal atherosclerotic calcification. Mesenteric and renal arteries are patent and  within normal limits.    There is complete occlusion of the right common iliac artery immediately distal to its origin. There is reconstitution of flow to the common femoral artery on the right via a small abdominal wall collateral branches. There is 40% luminal diameter narrowing of the common femoral artery. The superficial femoral artery is normal in caliber. The popliteal artery is occluded at the level of the knee joint. Distal flow to small branches in the right calf is noted. Delayed images demonstrate evidence of minimal reconstituted flow to the anterior tibial artery.    The left common femoral artery is normal in caliber. The superficial femoral artery is patent and within normal limits. On the left, the proximal popliteal artery is patent and within normal limits. The anterior tibial artery is patent at its origin, with evidence of complete or near complete occlusion of the tibioperoneal trunk. The anterior tibial artery distal to its origin is threadlike in appearance. Delayed images demonstrate distal flow to small calf branches, with threadlike patent anterior tibial artery.    Incidentally noted is a left lower quadrant colostomy.    IMPRESSION:  1. 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  2. While the superficial femoral artery on the right is patent without high-grade stenosis, there is occlusion of the right popliteal artery, with flow to the right calf via a small intramuscular branches.  3. Occlusion of the tibioperoneal trunk on the left. The anterior tibial artery is patent at its origin, but becomes threadlike distal to that point. Flow to the left ankle is noted be a small intramuscular collaterals and a threadlike anterior tibial artery.    Electronically signed by:  Roger Berumen MD  6/12/2023 3:11 PM CDT Workstation: [id number]                             X-Ray Chest AP Portable (Final result)  Result time 06/12/23 12:37:27    Final  result by Irasema Abdalla MD (06/12/23 12:37:27)                 Narrative:    Portable chest x-ray at 12:20 PM    Clinical history is cold right foot    There is a right IJ Port-A-Cath with the tip overlying the superior vena cava. The cardiomediastinal silhouette is normal in size. The lungs are clear. There are no acute osseous abnormalities.    IMPRESSION: No acute pulmonary process    Electronically signed by:  Irasema Abdalla MD  6/12/2023 12:37 PM CDT Workstation: [id number]                               Lab Results   Component Value Date    WBC 5.14 06/13/2023    HGB 12.1 06/13/2023    HCT 36.9 (L) 06/13/2023    MCV 90 06/13/2023     06/13/2023     BMP  Lab Results   Component Value Date     06/13/2023    K 4.2 06/13/2023     06/13/2023    CO2 27 06/13/2023    BUN 16 06/13/2023    CREATININE 1.0 06/13/2023    CALCIUM 8.8 06/13/2023    ANIONGAP 8 06/13/2023     (H) 06/13/2023    GLU 92 06/12/2023       No results found for this or any previous visit.            Pre-op Assessment    I have reviewed the Patient Summary Reports.     I have reviewed the Nursing Notes. I have reviewed the NPO Status.   I have reviewed the Medications.     Review of Systems  Anesthesia Hx:  No problems with previous Anesthesia  Denies Family Hx of Anesthesia complications.   Denies Personal Hx of Anesthesia complications.   Social:  Non-Smoker    Hematology/Oncology:  Hematology Normal        EENT/Dental:EENT/Dental Normal   Cardiovascular:   Hypertension PVD Right leg pain/cold foot.   Pulmonary:  Pulmonary Normal    Renal/:  Renal/ Normal     Hepatic/GI:  Hepatic/GI Normal    Musculoskeletal:  Musculoskeletal Normal    Neurological:  Neurology Normal    Endocrine:  Endocrine Normal    Psych:  Psychiatric Normal           Physical Exam  General: Well nourished and Anxious    Airway:  Mallampati: II   Mouth Opening: Normal  TM Distance: Normal  Tongue: Normal  Neck ROM: Normal  ROM    Chest/Lungs:  Clear to auscultation, Normal Respiratory Rate    Heart:  Rate: Normal  Rhythm: Regular Rhythm        Anesthesia Plan  Type of Anesthesia, risks & benefits discussed:    Anesthesia Type: Gen ETT  Intra-op Monitoring Plan: Standard ASA Monitors  Post Op Pain Control Plan: IV/PO Opioids PRN and multimodal analgesia  Induction:  IV  Airway Plan: Video  Informed Consent: Informed consent signed with the Patient and all parties understand the risks and agree with anesthesia plan.  All questions answered.   ASA Score: 3 Emergent  Anesthesia Plan Notes: Multimodal analgesia with ofirmev 1000mg, decadron 8 mg, and ketamine 50 mg.  PONV prophylaxis with Pepcid 20 mg IV, and Zofran 4 mg IV.      Ready For Surgery From Anesthesia Perspective.     .

## 2023-06-13 NOTE — ANESTHESIA PROCEDURE NOTES
Intubation    Date/Time: 6/13/2023 4:06 PM  Performed by: Robson Crabtree CRNA  Authorized by: Gustavo Duarte MD     Intubation:     Induction:  Intravenous    Intubated:  Postinduction    Mask Ventilation:  Easy mask    Attempts:  1    Attempted By:  CRNA    Method of Intubation:  Video laryngoscopy    Blade:  Stringer 3    Laryngeal View Grade: Grade I - full view of cords      Difficult Airway Encountered?: No      Complications:  None    Airway Device:  Oral endotracheal tube    Airway Device Size:  7.0    Style/Cuff Inflation:  Cuffed    Inflation Amount (mL):  7    Tube secured:  20    Secured at:  The lips    Placement Verified By:  Capnometry    Complicating Factors:  None    Findings Post-Intubation:  BS equal bilateral and atraumatic/condition of teeth unchanged

## 2023-06-13 NOTE — NURSING
R foot currently is dusky and cool. Previous assessment 30 mins ago the foot was pale and cool. Foot color and temperature is changing frequently with doppler pulses that are intermittently dopplerable. Dr. Aleman is aware and systemic heparin continues at 500 units/hr to PIV, TPA infusing to Delroy catheter at 10 ml (0.5 mg/hr).

## 2023-06-13 NOTE — OP NOTE
[facility]  Surgery Department  Operative Note    SUMMARY     Date of Procedure: 6/13/2023     Procedure: Procedure(s) (LRB):  Angiogram Extremity Unilateral (N/A)     Surgeon(s) and Role:     * Moody Aleman MD - Primary    Assisting Surgeon: None    Pre-Operative Diagnosis: Cold right foot [R20.9]    Post-Operative Diagnosis: Post-Op Diagnosis Codes:     * Cold right foot [R20.9]    Anesthesia: RN IV Sedation    Operative Findings (including complications, if any):  Persistent right popliteal thrombosis with diffuse thrombus in the posterior tib and peroneal arteries    Description of Technical Procedures:  Right lower extremity angiogram via existing catheters with discontinuation of tPA.  Selective angiography of the posterior tibial artery.    Femoral sheaths were prepped and draped the infusion catheter from the left groin to the right popliteal artery was backed up and angiogram was performed through the catheter which showed persistent thrombosis of the distal popliteal artery.  The posterior tib anterior tib and peroneal vessels did not visualized..  We then advanced the Glidewire into the thrombosed popliteal artery and was able to enter the posterior tibial and it appeared to be diffusely thrombosed.  Since it did not improve overnight with thrombolysis this may reflect chronic thrombus.  Plan will be to take her to the OR today to attempt open embolectomy of the distal popliteal and tibial vessels.    Significant Surgical Tasks Conducted by the Assistant(s), if Applicable:     Estimated Blood Loss (EBL): * No values recorded between 6/13/2023  2:53 PM and 6/13/2023  3:08 PM *           Implants: * No implants in log *    Specimens:   Specimen (24h ago, onward)      None                    Condition: Good    Disposition:  To the OR for open embolectomy    Attestation: I was present and scrubbed for the entire procedure.

## 2023-06-13 NOTE — PLAN OF CARE
06/13/23 0722   Patient Assessment/Suction   Level of Consciousness (AVPU) alert   Respiratory Effort Normal;Unlabored   Expansion/Accessory Muscles/Retractions no retractions;no use of accessory muscles   PRE-TX-O2   Device (Oxygen Therapy) nasal cannula   $ Is the patient on Low Flow Oxygen? Yes   Flow (L/min) 2   SpO2 (!) 93 %   Pulse Oximetry Type Continuous   $ Pulse Oximetry - Multiple Charge Pulse Oximetry - Multiple   Pulse 73   Resp 19   Education   $ Education 15 min

## 2023-06-14 LAB
ABO + RH BLD: NORMAL
ANION GAP SERPL CALC-SCNC: 5 MMOL/L (ref 8–16)
BASOPHILS # BLD AUTO: 0.01 K/UL (ref 0–0.2)
BASOPHILS NFR BLD: 0.2 % (ref 0–1.9)
BLD GP AB SCN CELLS X3 SERPL QL: NORMAL
BUN SERPL-MCNC: 14 MG/DL (ref 6–20)
CALCIUM SERPL-MCNC: 7.7 MG/DL (ref 8.7–10.5)
CHLORIDE SERPL-SCNC: 106 MMOL/L (ref 95–110)
CO2 SERPL-SCNC: 26 MMOL/L (ref 23–29)
CREAT SERPL-MCNC: 0.7 MG/DL (ref 0.5–1.4)
DIFFERENTIAL METHOD: ABNORMAL
EOSINOPHIL # BLD AUTO: 0 K/UL (ref 0–0.5)
EOSINOPHIL NFR BLD: 0 % (ref 0–8)
ERYTHROCYTE [DISTWIDTH] IN BLOOD BY AUTOMATED COUNT: 13 % (ref 11.5–14.5)
EST. GFR  (NO RACE VARIABLE): >60 ML/MIN/1.73 M^2
GLUCOSE SERPL-MCNC: 157 MG/DL (ref 70–110)
HCT VFR BLD AUTO: 25 % (ref 37–48.5)
HGB BLD-MCNC: 8.1 G/DL (ref 12–16)
IMM GRANULOCYTES # BLD AUTO: 0.03 K/UL (ref 0–0.04)
IMM GRANULOCYTES NFR BLD AUTO: 0.5 % (ref 0–0.5)
LYMPHOCYTES # BLD AUTO: 0.6 K/UL (ref 1–4.8)
LYMPHOCYTES NFR BLD: 10.1 % (ref 18–48)
MAGNESIUM SERPL-MCNC: 1.5 MG/DL (ref 1.6–2.6)
MCH RBC QN AUTO: 29 PG (ref 27–31)
MCHC RBC AUTO-ENTMCNC: 32.4 G/DL (ref 32–36)
MCV RBC AUTO: 90 FL (ref 82–98)
MONOCYTES # BLD AUTO: 0.4 K/UL (ref 0.3–1)
MONOCYTES NFR BLD: 6.3 % (ref 4–15)
NEUTROPHILS # BLD AUTO: 4.8 K/UL (ref 1.8–7.7)
NEUTROPHILS NFR BLD: 82.9 % (ref 38–73)
NRBC BLD-RTO: 0 /100 WBC
PLATELET # BLD AUTO: 155 K/UL (ref 150–450)
PMV BLD AUTO: 10.2 FL (ref 9.2–12.9)
POTASSIUM SERPL-SCNC: 4.2 MMOL/L (ref 3.5–5.1)
RBC # BLD AUTO: 2.79 M/UL (ref 4–5.4)
SODIUM SERPL-SCNC: 137 MMOL/L (ref 136–145)
WBC # BLD AUTO: 5.74 K/UL (ref 3.9–12.7)

## 2023-06-14 PROCEDURE — 86900 BLOOD TYPING SEROLOGIC ABO: CPT | Performed by: SURGERY

## 2023-06-14 PROCEDURE — 86920 COMPATIBILITY TEST SPIN: CPT | Performed by: SURGERY

## 2023-06-14 PROCEDURE — 85025 COMPLETE CBC W/AUTO DIFF WBC: CPT | Performed by: SURGERY

## 2023-06-14 PROCEDURE — 36415 COLL VENOUS BLD VENIPUNCTURE: CPT | Performed by: INTERNAL MEDICINE

## 2023-06-14 PROCEDURE — 80048 BASIC METABOLIC PNL TOTAL CA: CPT | Performed by: SURGERY

## 2023-06-14 PROCEDURE — 86920 COMPATIBILITY TEST SPIN: CPT | Performed by: INTERNAL MEDICINE

## 2023-06-14 PROCEDURE — 63600175 PHARM REV CODE 636 W HCPCS: Performed by: SURGERY

## 2023-06-14 PROCEDURE — 25000003 PHARM REV CODE 250: Performed by: SURGERY

## 2023-06-14 PROCEDURE — 99900031 HC PATIENT EDUCATION (STAT)

## 2023-06-14 PROCEDURE — 63600175 PHARM REV CODE 636 W HCPCS: Performed by: INTERNAL MEDICINE

## 2023-06-14 PROCEDURE — 20000000 HC ICU ROOM

## 2023-06-14 PROCEDURE — 83735 ASSAY OF MAGNESIUM: CPT | Performed by: SURGERY

## 2023-06-14 PROCEDURE — 94761 N-INVAS EAR/PLS OXIMETRY MLT: CPT

## 2023-06-14 RX ORDER — MAGNESIUM SULFATE HEPTAHYDRATE 40 MG/ML
4 INJECTION, SOLUTION INTRAVENOUS
Status: DISCONTINUED | OUTPATIENT
Start: 2023-06-14 | End: 2023-06-21 | Stop reason: HOSPADM

## 2023-06-14 RX ORDER — MAGNESIUM SULFATE HEPTAHYDRATE 40 MG/ML
4 INJECTION, SOLUTION INTRAVENOUS ONCE
Status: COMPLETED | OUTPATIENT
Start: 2023-06-14 | End: 2023-06-14

## 2023-06-14 RX ORDER — MAGNESIUM SULFATE HEPTAHYDRATE 40 MG/ML
2 INJECTION, SOLUTION INTRAVENOUS
Status: DISCONTINUED | OUTPATIENT
Start: 2023-06-14 | End: 2023-06-21 | Stop reason: HOSPADM

## 2023-06-14 RX ORDER — HYDROMORPHONE HYDROCHLORIDE 1 MG/ML
0.5 INJECTION, SOLUTION INTRAMUSCULAR; INTRAVENOUS; SUBCUTANEOUS EVERY 4 HOURS PRN
Status: DISCONTINUED | OUTPATIENT
Start: 2023-06-14 | End: 2023-06-15

## 2023-06-14 RX ADMIN — OXYCODONE HYDROCHLORIDE AND ACETAMINOPHEN 1 TABLET: 10; 325 TABLET ORAL at 09:06

## 2023-06-14 RX ADMIN — OXYCODONE HYDROCHLORIDE AND ACETAMINOPHEN 1 TABLET: 10; 325 TABLET ORAL at 01:06

## 2023-06-14 RX ADMIN — MUPIROCIN 1 G: 20 OINTMENT TOPICAL at 08:06

## 2023-06-14 RX ADMIN — OXYCODONE HYDROCHLORIDE AND ACETAMINOPHEN 1 TABLET: 10; 325 TABLET ORAL at 05:06

## 2023-06-14 RX ADMIN — OXYCODONE HYDROCHLORIDE AND ACETAMINOPHEN 1 TABLET: 10; 325 TABLET ORAL at 08:06

## 2023-06-14 RX ADMIN — CHLORHEXIDINE GLUCONATE 15 ML: 1.2 RINSE ORAL at 08:06

## 2023-06-14 RX ADMIN — HYDROMORPHONE HYDROCHLORIDE 1 MG: 1 INJECTION, SOLUTION INTRAMUSCULAR; INTRAVENOUS; SUBCUTANEOUS at 03:06

## 2023-06-14 RX ADMIN — FENTANYL CITRATE 25 MCG: 50 INJECTION, SOLUTION INTRAMUSCULAR; INTRAVENOUS at 01:06

## 2023-06-14 RX ADMIN — ACETAMINOPHEN 650 MG: 325 TABLET ORAL at 02:06

## 2023-06-14 RX ADMIN — FENTANYL CITRATE 25 MCG: 50 INJECTION, SOLUTION INTRAMUSCULAR; INTRAVENOUS at 08:06

## 2023-06-14 RX ADMIN — PREGABALIN 150 MG: 75 CAPSULE ORAL at 08:06

## 2023-06-14 RX ADMIN — MAGNESIUM SULFATE HEPTAHYDRATE 4 G: 40 INJECTION, SOLUTION INTRAVENOUS at 08:06

## 2023-06-14 RX ADMIN — FENTANYL CITRATE 25 MCG: 50 INJECTION, SOLUTION INTRAMUSCULAR; INTRAVENOUS at 02:06

## 2023-06-14 RX ADMIN — FENTANYL CITRATE 25 MCG: 50 INJECTION, SOLUTION INTRAMUSCULAR; INTRAVENOUS at 06:06

## 2023-06-14 NOTE — PHYSICIAN QUERY
"PT Name: Carine Darden  MR #: [id number]     DOCUMENTATION CLARIFICATION      CDS/: Janay Saunders               Contact [phone] or Epic messenger  This form is a permanent document in the medical record.    Query Date: June 14, 2023    By submitting this query, we are merely seeking further clarification of documentation to reflect the severity of illness of your patient. Please utilize your independent clinical judgment when addressing the question(s) below.     The Medical Record contains the following:   Clinical Information   Location in Medical Record    Risk Factors:  " known history of cervical cancer s/p chemoradiation, colostomy status,"       H&P    Clinical Indicators:  Documented Radiation Colitis       H&P & PN 6/13    Interventions:    Colostomy status     H&P      Provider, please specify the acuity/chronicity of Radiation Colitis:    [   ] Radiation Colitis is a current diagnosis   [  ] Radiation Colitis is not a current diagnosis, past history only   [   ] Other (please specify): _______________   Answered in Discharge Summary.      Form No. 10139  "

## 2023-06-14 NOTE — PLAN OF CARE
[facility]  Initial Discharge Assessment     Assessment completed at bedside with patient. Demographics verified, PCP updated.  Patient lives at home with her  and is fully independent with ADL's.  Has a cane she uses as needed and manages her own colostomy care.  Currently, discharge plan is home with no needs expressed or identified at this time.  Will continue to follow.   Primary Care Provider: Inocente Guillermo NP    Admission Diagnosis: Vascular occlusion [I99.8]    Admission Date: 6/12/2023  Expected Discharge Date: 6/15/2023    Transition of Care Barriers: None    Payor: HU[employer] MANAGED MEDICARE / Plan: HU[employer] MEDICARE PPO / Product Type: Medicare Advantage /     Extended Emergency Contact Information  Primary Emergency Contact: dewayne khan  Mobile Phone: [phone]  Relation: Spouse   needed? No    Discharge Plan A: Home with family  Discharge Plan B: Home with family    No Pharmacies Listed    Initial Assessment (most recent)       Adult Discharge Assessment - 06/14/23 1530          Discharge Assessment    Assessment Type Discharge Planning Assessment     Confirmed/corrected address, phone number and insurance Yes     Confirmed Demographics Correct on Facesheet     Source of Information patient     Communicated SHONDA with patient/caregiver Date not available/Unable to determine     People in Home spouse     Facility Arrived From: Home     Do you expect to return to your current living situation? Yes     Do you have help at home or someone to help you manage your care at home? Yes     Who are your caregiver(s) and their phone number(s)? dewayne khan (Spouse)   [phone] (Mobile)     Prior to hospitilization cognitive status: Alert/Oriented;No Deficits     Current cognitive status: Alert/Oriented;No Deficits     Equipment Currently Used at Home colostomy/ostomy supplies;cane, straight     Readmission within 30 days? No     Patient currently being followed by outpatient  case management? No     Do you currently have service(s) that help you manage your care at home? No     Do you take prescription medications? Yes     Do you have prescription coverage? Yes     Coverage [employer], [place] Medicaid     Do you have any problems affording any of your prescribed medications? No     Is the patient taking medications as prescribed? yes     Who is going to help you get home at discharge? dewayne khan (Spouse)   [phone] (Mobile)     How do you get to doctors appointments? family or friend will provide;car, drives self     Are you on dialysis? No     Do you take coumadin? No     Discharge Plan A Home with family     Discharge Plan B Home with family     DME Needed Upon Discharge  none     Discharge Plan discussed with: Patient     Transition of Care Barriers None

## 2023-06-14 NOTE — OP NOTE
[facility]  Surgery Department  Operative Note    SUMMARY     Date of Procedure: 6/13/2023     Procedure: Procedure(s) (LRB):  EMBOLECTOMY, LOWER EXTREMITY (Right)  CREATION, BYPASS, ARTERIAL, FEMORAL TO POPLITEAL, USING IN-SITU VEIN GRAFT (Right)     Surgeon(s) and Role:     * Moody Aleman MD - Primary    Assisting Surgeon: None    Pre-Operative Diagnosis: Cold right foot [R20.9]    Post-Operative Diagnosis: Post-Op Diagnosis Codes:     * Cold right foot [R20.9]    Anesthesia: General    Operative Findings (including complications, if any):  Chronic thrombosis right popliteal artery.  Chronic thrombus within the anterior tibial artery.    Description of Technical Procedures:  Right below-knee popliteal artery exploration with attempted thrombectomy.  Right above knee popliteal to anterior tibial artery saphenous vein bypass graft with completion angiogram.  Exploration of right posterior tibial artery.    Patient was brought emergently from the cath lab after tPA infusion overnight into the popliteal artery which would not did not result in any lysis of the popliteal thrombus.  She was prepped and draped sterile fashion over the right leg a skin incision made below the knee medially in the popliteal artery was dissected out down to the takeoff of the anterior tibial artery.  Vessels pulled up on the artery arteriotomy was made longitudinally which revealed chronic fibrotic occlusion with no fresh thrombus.  Attempted to pass the Delores proximally distally but could not remove any fresh thrombus and all of the fibrotic tissue was debrided away within the lumen that I can see with a [place] elevator and we tried to do an eversion endarterectomy with that was unsuccessful.  At this point I reviewed the CTA again which appeared to show a anterior tibial artery as the best target distally which may have been patent.  Then harvested the greater saphenous vein from the below-knee popliteal incision up to  the groin vein was ligated and divided proximally all its side branches were ligated and divided it was dilated with hep saline appeared to be good-sized vein this was placed on heparin saline and then a incision was deepened above the knee to the above knee popliteal artery which did not have a pulse and we then dissected out looped bit more proximally to the distal SFA and found pulsatile segment of the artery.  Lower lateral leg incision was made and the anterior tibial artery was dissected out and packed with papaverine soaked gauze.  Patient received 7500 units of IV heparin the popliteal artery was opened longitudinally and had some blood flow but was not robust we then passed a 3. Delores catheter proximally and removed large amount of thrombus in the SFA and had a good pulsatile inflow.  Vein was then spatula aided and end-to-side anastomosis was performed to the distal SFA after completion during the anastomosis [place] valvular tone was used to lyse the valves and once we had arterial pulsatile flow vein was marked on its anterior surface tunneled down to the below-knee incision and then used the aortic clamp to make a tunneled through the interosseous membrane but and brought the vein graft laterally to the anterior tibial artery tourniquet was inflated above the knee and arteriotomy was made the anterior tibial was opened and also had some old chronic fibrotic changes this was debrided away and angiogram anastomosis was performed.  After completion anastomosis the tourniquet was let down and then completion angiogram showed flow into the anterior tibial artery retrograde up the leg and then through some collateralization down to the foot.  The posterior tibial artery appeared to that it could be possibly a good target vessel for re anastomosis and made an incision medially in the posterior tibial artery was dissected out and appeared similar to the anterior tib and during the angiogram down stairs when I  was able to get into the posterior tibial artery it had chronic changes in at this point I decided not to redo the anastomosis since we had a dopplerable signal and I did not want to damage any more collateralization.  Feel the patient is at high risk for limb loss but at this point we had Doppler signal and procedure was completed wounds were closed the deep layer 3-0 Vicryl in 2 layers and skin staples the lateral leg incision was packed open since there was so much muscle swelling.  Patient was transferred to the ICU.    Significant Surgical Tasks Conducted by the Assistant(s), if Applicable:     Estimated Blood Loss (EBL): 500 mL           Implants: * No implants in log *    Specimens:   Specimen (24h ago, onward)      None                    Condition: Stable    Disposition: ICU - extubated and stable.    Attestation: I was present and scrubbed for the entire procedure.

## 2023-06-14 NOTE — ANESTHESIA POSTPROCEDURE EVALUATION
Anesthesia Post Evaluation    Patient: Carine Darden    Procedure(s) Performed: Procedure(s) (LRB):  EMBOLECTOMY, LOWER EXTREMITY (Right)  CREATION, BYPASS, ARTERIAL, FEMORAL TO POPLITEAL, USING IN-SITU VEIN GRAFT (Right)    Final Anesthesia Type: general      Patient location during evaluation: ICU  Patient participation: Yes- Able to Participate  Level of consciousness: awake and alert  Post-procedure vital signs: reviewed and stable  Pain management: adequate  Airway patency: patent    PONV status at discharge: No PONV  Anesthetic complications: no      Cardiovascular status: blood pressure returned to baseline and stable  Respiratory status: unassisted and room air  Hydration status: euvolemic  Follow-up not needed.  Comments: Patient doing well this morning.  Is still having pain to her foot,  but it is manageable.  She is aware of the possibility that she could lose the foot despite all our efforts to save it.  She was finally able to get in touch with her .          Vitals Value Taken Time   BP 99/58 06/14/23 0800   Temp 37.1 °C (98.7 °F) 06/14/23 0400   Pulse 100 06/14/23 0839   Resp 16 06/14/23 0600   SpO2 97 % 06/14/23 0839   Vitals shown include unvalidated device data.      No case tracking events are documented in the log.      Pain/Jordan Score: Pain Rating Prior to Med Admin: 10 (6/14/2023  3:36 AM)  Pain Rating Post Med Admin: 3 (6/14/2023  3:39 AM)  Jordan Score: 9 (6/13/2023  3:14 PM)

## 2023-06-14 NOTE — TRANSFER OF CARE
"Anesthesia Transfer of Care Note    Patient: Carine Darden    Procedure(s) Performed: Procedure(s) (LRB):  EMBOLECTOMY, LOWER EXTREMITY (Right)  CREATION, BYPASS, ARTERIAL, FEMORAL TO POPLITEAL, USING IN-SITU VEIN GRAFT (Right)    Patient location: ICU    Anesthesia Type: general    Transport from OR: Transported from OR on 2-3 L/min O2 by NC with adequate spontaneous ventilation. Continuous ECG monitoring in transport. Continuous SpO2 monitoring in transport. Continuos invasive BP monitoring in transport    Post pain: adequate analgesia    Post assessment: no apparent anesthetic complications and tolerated procedure well    Post vital signs: stable    Level of consciousness: confused and awake    Complications: none    Transfer of care protocol was followed      Last vitals:   Visit Vitals  /62   Pulse 73   Temp 36.9 °C (98.5 °F) (Oral)   Resp 12   Ht 5' 2" (1.575 m)   Wt 84.8 kg (186 lb 15.2 oz)   SpO2 96%   BMI 34.19 kg/m²     "

## 2023-06-14 NOTE — PLAN OF CARE
Problem: Adult Inpatient Plan of Care  Goal: Plan of Care Review  Outcome: Ongoing, Not Progressing  Goal: Patient-Specific Goal (Individualized)  Outcome: Ongoing, Not Progressing  Goal: Absence of Hospital-Acquired Illness or Injury  Outcome: Ongoing, Not Progressing  Goal: Optimal Comfort and Wellbeing  Outcome: Ongoing, Not Progressing  Goal: Readiness for Transition of Care  Outcome: Ongoing, Not Progressing     Problem: Fall Injury Risk  Goal: Absence of Fall and Fall-Related Injury  Outcome: Ongoing, Not Progressing     Problem: Infection  Goal: Absence of Infection Signs and Symptoms  Outcome: Ongoing, Not Progressing

## 2023-06-14 NOTE — PROGRESS NOTES
"[facility] Medicine Progress Note  Patient Name: Carine Darden MRN: [id number]   Patient Class: IP- Inpatient  Length of Stay: 2   Admission Date: 6/12/2023 11:06 AM Attending Physician: Jasmeet Balbuena MD   Primary Care Provider: Inocente Guillermo NP Face-to-Face encounter date: 06/14/2023   Chief Complaint: Leg Pain (Pt R foot is cool to the touch. 10/10 pain stated by pt. Started 9pm last night. Pt states "numbness and tingling" and hip pain. )      Subjective:    Interval History   No pulse in the leg  Denies chest pain, shortness of breath, palpitations, abdominal pain, nausea/vomiting.   No concerns/issues overnight reported by the patient or the nursing staff.  Reviewed the labs and discussed the plan of care.   family present at bedside.     Review of Systems   All other Review of Systems were found to be negative expect for that mentioned already in HPI.     Hospital Course     06/14/2023     chlorhexidine  15 mL Mouth/Throat BID    mupirocin   Nasal BID    pregabalin  150 mg Oral BID       acetaminophen, acetaminophen, aluminum-magnesium hydroxide-simethicone, dextrose 50%, dextrose 50%, fentaNYL, glucagon (human recombinant), glucose, glucose, HYDROmorphone, magnesium sulfate IVPB, magnesium sulfate IVPB, melatonin, naloxone, ondansetron, oxyCODONE-acetaminophen, potassium bicarbonate, potassium bicarbonate, potassium bicarbonate, prochlorperazine, simethicone, sodium chloride 0.9%      Objective:   Physical Exam  BP (!) 101/55   Pulse 96   Temp (!) 100.5 °F (38.1 °C) (Oral)   Resp (!) 33   Ht 5' 2" (1.575 m)   Wt 84.8 kg (186 lb 15.2 oz)   SpO2 (!) 93%   BMI 34.19 kg/m²   Constitutional: No distress.   HENT: Atraumatic.   Cardiovascular: Normal rate, regular rhythm and normal heart sounds.   Pulmonary/Chest: Effort normal. Clear to auscultation bilaterally. No wheezes.   Abdominal: Soft. Bowel sounds are normal. Exhibits no distension and no mass. No " tenderness  Neurological: Alert.   Skin: Skin is warm and dry.     Labs and Imaging    Significant Labs: All pertinent labs within the past 24 hours have been reviewed.    Significant Imaging: I have reviewed all pertinent imaging results/findings within the past 24 hours.    I have reviewed the Vitals, labs and imaging as above.     Assessment & Plan:   Carine Darden is a 57 y.o. female admitted for    Active Hospital Problems    Diagnosis    *Critical limb ischemia of right lower extremity    Colostomy status    Essential (primary) hypertension    Radiation colitis    Malignant neoplasm of cervix       Plan  Surgical intervention not successful.   No flow in her leg  Unfortunately she will need BKA/AKA  Increased lyrica to 150mg BID   Continue fentanyl and oxycodone      Active PT: Yes  Active OT: No  Active SLP: No    Core measures:  - Code status:   - Diet: Cardiac  VTE Risk Mitigation (From admission, onward)           Ordered     IP VTE HIGH RISK PATIENT  Once         06/13/23 0705     Place sequential compression device  Until discontinued         06/13/23 0705     Reason for No Pharmacological VTE Prophylaxis  Once        Question:  Reasons:  Answer:  IV Heparin w/in 24 hrs. Pre or Post-Op    06/13/23 0705                    Discharge Planning:   Discharge Planning   SHONDA: 06/16    Code Status: Full Code   Is the patient medically ready for discharge?: no    Reason for patient still in hospital (select all that apply): Patient trending condition  Discharge Plan A: Home with assistance from family        Above encounter included review of the medical records, interviewing and examining the patient face-to-face, discussion with family and other health care providers, ordering and interpreting lab/test results and formulating a plan of care.     Medical Decision Making:  [] Low Complexity  [] Moderate Complexity  [x] High Complexity    Jasmeet Balbuena MD  [facility] Hospitalist  06/14/2023

## 2023-06-14 NOTE — PROGRESS NOTES
"[facility]  Adult Nutrition   Progress Note (Initial Assessment)     SUMMARY     Recommendations  1. Continue diet and encourage po intake.   2. RD available prn.  Goals: [place] po intake > 75% to meet EEN/EPN.  Nutrition Goal Status: progressing towards goal    Dietitian Rounds Brief  Pt in ICU x48 hrs. She has had pain and numbness to right lower leg. Procedure done yesterday and diet advance last night. Pt with fair appetite.    Diet order:        Cardiac             Evaluation of Received Nutrient/Fluid Intake  Energy Calories Required: meeting needs  Protein Required: meeting needs  Fluid Required: meeting needs  Tolerance: tolerating     % Intake of Estimated Energy Needs: 50%  % Meal Intake: 50 - 75 %      Intake/Output Summary (Last 24 hours) at 6/14/2023 1421  Last data filed at 6/14/2023 1056  Gross per 24 hour   Intake 1880 ml   Output 2635 ml   Net -755 ml        Anthropometrics  Temp: 98.5 °F (36.9 °C)  Height Method: Stated  Height: 5' 2" (157.5 cm)  Height (inches): 62 in  Weight Method: Bed Scale  Weight: 84.8 kg (186 lb 15.2 oz)  Weight (lb): 186.95 lb  Ideal Body Weight (IBW), Female: 110 lb  % Ideal Body Weight, Female (lb): 169.95 %  BMI (Calculated): 34.2  BMI Grade: 30 - 34.9- obesity - grade I       Estimated/Assessed Needs  Weight Used For Calorie Calculations: 84.8 kg (186 lb 15.2 oz)  Energy Calorie Requirements (kcal): 0626-9069 kcal (20-25 kcal/ kg bw)  Energy Need Method: Kcal/kg  Protein Requirements: 102-127g (1.2-1.5g/ kg bw)  Weight Used For Protein Calculations: 84.8 kg (186 lb 15.2 oz)        RDA Method (mL): 1696       Reason for Assessment  Reason For Assessment: identified at risk by screening criteria  Relevant Medical History: cervical cancer with colon resection, current colostomy  Interdisciplinary Rounds: did not attend    Nutrition/Diet History  Food Allergies: NKFA  Factors Affecting Nutritional Intake: None identified at this time    Nutrition Risk " Screen  Nutrition Risk Screen: no indicators present     MST Score: 0  Have you recently lost weight without trying?: No  Weight loss score: 0  Have you been eating poorly because of a decreased appetite?: No  Appetite score: 0       Weight History:  Wt Readings from Last 10 Encounters:   06/13/23 84.8 kg (186 lb 15.2 oz)   06/13/23 84.8 kg (186 lb 15.2 oz)        Lab/Procedures/Meds: Pertinent Labs/Meds Reviewed    Medications:Pertinent Medications Reviewed  Scheduled Meds:   chlorhexidine  15 mL Mouth/Throat BID    mupirocin   Nasal BID    pregabalin  150 mg Oral BID     Continuous Infusions:   sodium chloride 0.9% 100 mL/hr at 06/13/23 2151     PRN Meds:.acetaminophen, acetaminophen, aluminum-magnesium hydroxide-simethicone, dextrose 50%, dextrose 50%, fentaNYL, glucagon (human recombinant), glucose, glucose, HYDROmorphone, magnesium sulfate IVPB, magnesium sulfate IVPB, melatonin, naloxone, ondansetron, oxyCODONE-acetaminophen, potassium bicarbonate, potassium bicarbonate, potassium bicarbonate, prochlorperazine, simethicone, sodium chloride 0.9%    Labs: Pertinent Labs Reviewed  Clinical Chemistry:  Recent Labs   Lab 06/12/23  1216 06/13/23  0125 06/14/23  0402   * 138 137   K 4.1 4.2 4.2    103 106   CO2 24 27 26   GLU 92 132* 157*   BUN 19 16 14   CREATININE 0.9 1.0 0.7   CALCIUM 8.7 8.8 7.7*   PROT 7.0 6.6  --    ALBUMIN 4.1 3.8  --    BILITOT 0.4 1.1*  --    ALKPHOS 66 69  --    AST 15 24  --    ALT 19 13  --    ANIONGAP 9 8 5*   MG 1.7  1.7  --  1.5*   LIPASE 26  --   --      CBC:   Recent Labs   Lab 06/14/23  0402   WBC 5.74   RBC 2.79*   HGB 8.1*   HCT 25.0*      MCV 90   MCH 29.0   MCHC 32.4     Lipid Panel:  No results for input(s): CHOL, HDL, LDLCALC, TRIG, CHOLHDL in the last 168 hours.  Cardiac Profile:  Recent Labs   Lab 06/12/23  1216   BNP 56        Inflammatory Labs:  No results for input(s): CRP in the last 168 hours.  Diabetes:  No results for input(s): HGBA1C,  POCTGLUCOSE in the last 168 hours.  Thyroid & Parathyroid:  Recent Labs   Lab 06/12/23  1216   TSH 2.930       Monitor and Evaluation  Food and Nutrient Intake: energy intake, food and beverage intake  Food and Nutrient Adminstration: diet order     Nutrition Risk  Level of Risk/Frequency of Follow-up: moderate - high     Nutrition Follow-Up  RD Follow-up?: Yes      Gracie Spencer RD 06/14/2023 2:21 PM

## 2023-06-14 NOTE — PT/OT/SLP PROGRESS
Physical Therapy      Patient Name:  Carine Darden   MRN:  [id number]    Patient not seen today secondary to Other (Comment) (bedrest order til 2138.). Will follow-up 6/15/23.

## 2023-06-15 LAB
ANION GAP SERPL CALC-SCNC: 9 MMOL/L (ref 8–16)
BASOPHILS # BLD AUTO: 0.01 K/UL (ref 0–0.2)
BASOPHILS NFR BLD: 0.1 % (ref 0–1.9)
BLD PROD TYP BPU: NORMAL
BLOOD UNIT EXPIRATION DATE: NORMAL
BLOOD UNIT TYPE CODE: 5100
BLOOD UNIT TYPE: NORMAL
BUN SERPL-MCNC: 16 MG/DL (ref 6–20)
CALCIUM SERPL-MCNC: 8 MG/DL (ref 8.7–10.5)
CHLORIDE SERPL-SCNC: 102 MMOL/L (ref 95–110)
CO2 SERPL-SCNC: 26 MMOL/L (ref 23–29)
CODING SYSTEM: NORMAL
CREAT SERPL-MCNC: 0.8 MG/DL (ref 0.5–1.4)
CROSSMATCH INTERPRETATION: NORMAL
DIFFERENTIAL METHOD: ABNORMAL
DISPENSE STATUS: NORMAL
EOSINOPHIL # BLD AUTO: 0.1 K/UL (ref 0–0.5)
EOSINOPHIL NFR BLD: 1.3 % (ref 0–8)
ERYTHROCYTE [DISTWIDTH] IN BLOOD BY AUTOMATED COUNT: 13.3 % (ref 11.5–14.5)
EST. GFR  (NO RACE VARIABLE): >60 ML/MIN/1.73 M^2
GLUCOSE SERPL-MCNC: 144 MG/DL (ref 70–110)
HCT VFR BLD AUTO: 21.7 % (ref 37–48.5)
HGB BLD-MCNC: 7 G/DL (ref 12–16)
IMM GRANULOCYTES # BLD AUTO: 0.04 K/UL (ref 0–0.04)
IMM GRANULOCYTES NFR BLD AUTO: 0.6 % (ref 0–0.5)
LYMPHOCYTES # BLD AUTO: 0.9 K/UL (ref 1–4.8)
LYMPHOCYTES NFR BLD: 13.9 % (ref 18–48)
MAGNESIUM SERPL-MCNC: 1.9 MG/DL (ref 1.6–2.6)
MCH RBC QN AUTO: 29.5 PG (ref 27–31)
MCHC RBC AUTO-ENTMCNC: 32.3 G/DL (ref 32–36)
MCV RBC AUTO: 92 FL (ref 82–98)
MONOCYTES # BLD AUTO: 0.7 K/UL (ref 0.3–1)
MONOCYTES NFR BLD: 10.6 % (ref 4–15)
NEUTROPHILS # BLD AUTO: 5 K/UL (ref 1.8–7.7)
NEUTROPHILS NFR BLD: 73.5 % (ref 38–73)
NRBC BLD-RTO: 0 /100 WBC
NUM UNITS TRANS PACKED RBC: NORMAL
PLATELET # BLD AUTO: 156 K/UL (ref 150–450)
PMV BLD AUTO: 9.6 FL (ref 9.2–12.9)
POTASSIUM SERPL-SCNC: 3.6 MMOL/L (ref 3.5–5.1)
RBC # BLD AUTO: 2.37 M/UL (ref 4–5.4)
SODIUM SERPL-SCNC: 137 MMOL/L (ref 136–145)
WBC # BLD AUTO: 6.78 K/UL (ref 3.9–12.7)

## 2023-06-15 PROCEDURE — 80048 BASIC METABOLIC PNL TOTAL CA: CPT | Performed by: SURGERY

## 2023-06-15 PROCEDURE — 36415 COLL VENOUS BLD VENIPUNCTURE: CPT | Performed by: SURGERY

## 2023-06-15 PROCEDURE — 36430 TRANSFUSION BLD/BLD COMPNT: CPT

## 2023-06-15 PROCEDURE — 25000003 PHARM REV CODE 250: Performed by: INTERNAL MEDICINE

## 2023-06-15 PROCEDURE — 99900031 HC PATIENT EDUCATION (STAT)

## 2023-06-15 PROCEDURE — 25000003 PHARM REV CODE 250: Performed by: SURGERY

## 2023-06-15 PROCEDURE — 20000000 HC ICU ROOM

## 2023-06-15 PROCEDURE — 63600175 PHARM REV CODE 636 W HCPCS: Performed by: SURGERY

## 2023-06-15 PROCEDURE — 63600175 PHARM REV CODE 636 W HCPCS: Performed by: INTERNAL MEDICINE

## 2023-06-15 PROCEDURE — 83735 ASSAY OF MAGNESIUM: CPT | Performed by: SURGERY

## 2023-06-15 PROCEDURE — P9016 RBC LEUKOCYTES REDUCED: HCPCS | Performed by: INTERNAL MEDICINE

## 2023-06-15 PROCEDURE — 94761 N-INVAS EAR/PLS OXIMETRY MLT: CPT

## 2023-06-15 PROCEDURE — 85025 COMPLETE CBC W/AUTO DIFF WBC: CPT | Performed by: SURGERY

## 2023-06-15 RX ORDER — OXYCODONE HYDROCHLORIDE 5 MG/1
15 TABLET ORAL EVERY 4 HOURS PRN
Status: DISCONTINUED | OUTPATIENT
Start: 2023-06-15 | End: 2023-06-21 | Stop reason: HOSPADM

## 2023-06-15 RX ORDER — PANTOPRAZOLE SODIUM 40 MG/1
40 TABLET, DELAYED RELEASE ORAL DAILY
Status: DISCONTINUED | OUTPATIENT
Start: 2023-06-15 | End: 2023-06-21 | Stop reason: HOSPADM

## 2023-06-15 RX ORDER — HYDROCODONE BITARTRATE AND ACETAMINOPHEN 500; 5 MG/1; MG/1
TABLET ORAL
Status: DISCONTINUED | OUTPATIENT
Start: 2023-06-15 | End: 2023-06-21 | Stop reason: HOSPADM

## 2023-06-15 RX ORDER — HYDROMORPHONE HYDROCHLORIDE 1 MG/ML
1 INJECTION, SOLUTION INTRAMUSCULAR; INTRAVENOUS; SUBCUTANEOUS EVERY 4 HOURS PRN
Status: DISCONTINUED | OUTPATIENT
Start: 2023-06-15 | End: 2023-06-19

## 2023-06-15 RX ADMIN — PREGABALIN 150 MG: 75 CAPSULE ORAL at 08:06

## 2023-06-15 RX ADMIN — HYDROMORPHONE HYDROCHLORIDE 0.5 MG: 0.5 INJECTION, SOLUTION INTRAMUSCULAR; INTRAVENOUS; SUBCUTANEOUS at 10:06

## 2023-06-15 RX ADMIN — PANTOPRAZOLE SODIUM 40 MG: 40 TABLET, DELAYED RELEASE ORAL at 01:06

## 2023-06-15 RX ADMIN — HYDROMORPHONE HYDROCHLORIDE 0.5 MG: 0.5 INJECTION, SOLUTION INTRAMUSCULAR; INTRAVENOUS; SUBCUTANEOUS at 01:06

## 2023-06-15 RX ADMIN — CHLORHEXIDINE GLUCONATE 15 ML: 1.2 RINSE ORAL at 08:06

## 2023-06-15 RX ADMIN — PROCHLORPERAZINE EDISYLATE 5 MG: 5 INJECTION INTRAMUSCULAR; INTRAVENOUS at 08:06

## 2023-06-15 RX ADMIN — POTASSIUM BICARBONATE 50 MEQ: 977.5 TABLET, EFFERVESCENT ORAL at 11:06

## 2023-06-15 RX ADMIN — OXYCODONE HYDROCHLORIDE AND ACETAMINOPHEN 1 TABLET: 10; 325 TABLET ORAL at 07:06

## 2023-06-15 RX ADMIN — HYDROMORPHONE HYDROCHLORIDE 1 MG: 1 INJECTION, SOLUTION INTRAMUSCULAR; INTRAVENOUS; SUBCUTANEOUS at 03:06

## 2023-06-15 RX ADMIN — MUPIROCIN 1 G: 20 OINTMENT TOPICAL at 08:06

## 2023-06-15 RX ADMIN — OXYCODONE HYDROCHLORIDE AND ACETAMINOPHEN 1 TABLET: 10; 325 TABLET ORAL at 12:06

## 2023-06-15 RX ADMIN — FENTANYL CITRATE 25 MCG: 50 INJECTION, SOLUTION INTRAMUSCULAR; INTRAVENOUS at 09:06

## 2023-06-15 RX ADMIN — OXYCODONE HYDROCHLORIDE 15 MG: 5 TABLET ORAL at 04:06

## 2023-06-15 RX ADMIN — HYDROMORPHONE HYDROCHLORIDE 1 MG: 1 INJECTION, SOLUTION INTRAMUSCULAR; INTRAVENOUS; SUBCUTANEOUS at 08:06

## 2023-06-15 RX ADMIN — ACETAMINOPHEN 1000 MG: 500 TABLET, FILM COATED ORAL at 04:06

## 2023-06-15 RX ADMIN — FENTANYL CITRATE 25 MCG: 50 INJECTION, SOLUTION INTRAMUSCULAR; INTRAVENOUS at 10:06

## 2023-06-15 RX ADMIN — HYDROMORPHONE HYDROCHLORIDE 0.5 MG: 0.5 INJECTION, SOLUTION INTRAMUSCULAR; INTRAVENOUS; SUBCUTANEOUS at 05:06

## 2023-06-15 NOTE — PT/OT/SLP PROGRESS
Physical Therapy      Patient Name:  Carine Darden   MRN:  [id number]    Patient not seen today secondary to Nurse/ VAISHALI hold, Other (Comment) (pending medical workup). Will follow-up 6/16/2023.

## 2023-06-15 NOTE — CONSULTS
[facility]  General Surgery  Consult Note    Inpatient consult to General Surgery  Consult performed by: Alexandr Seay III, MD  Consult ordered by: Og Foley MD  Reason for consult: right AKA      Subjective:     Chief Complaint/Reason for Admission: right AKA     History of Present Illness: 57 year old female admitted with acute limb ischemia. She was found to have occlusion of the right iliac, occlusion of right popliteal, Patent right femoral. She had attempted popliteal thrombectomy followed by right pop - ant tibial. Revascularization unsuccessful. Vein graft is down. Very little flow below the knee. Surgery consult for right AKA. She is awake and alert. Hemodynamically stable. Right foot pain is severe. She is a log time smoker. She has history of cervical cancer IIIb. She is s/p external radiation, brachytherapy, chemo fall 2022.  She is s/p diverting loop colostomy five months ago in [place] due to radiation procto colitis.     No current facility-administered medications on file prior to encounter.     Current Outpatient Medications on File Prior to Encounter   Medication Sig    cyclobenzaprine (FLEXERIL) 10 MG tablet Take 10 mg by mouth 3 (three) times daily as needed for Muscle spasms.    nortriptyline (PAMELOR) 25 MG capsule Take 50 mg by mouth every evening.    ondansetron (ZOFRAN-ODT) 4 MG TbDL Take 4 mg by mouth every 8 (eight) hours as needed.    oxyCODONE-acetaminophen (PERCOCET)  mg per tablet Take 1 tablet by mouth 3 (three) times daily as needed for Pain.    pregabalin (LYRICA) 75 MG capsule Take 75 mg by mouth 2 (two) times daily.       Review of patient's allergies indicates:   Allergen Reactions    Latex, natural rubber        No past medical history on file.  Past Surgical History:   Procedure Laterality Date    ANGIOGRAM, ABDOMINAL AORTA W/ EXTREMITY RUNOFF Right 6/12/2023    Procedure: Angiogram, Abdominal Aorta W/ Extremity Runoff;  Surgeon: Moody LU  MD Ash;  Location: [facility] CATH/EP LAB;  Service: Vascular;  Laterality: Right;    ANGIOGRAPHY OF LOWER EXTREMITY N/A 6/13/2023    Procedure: Angiogram Extremity Unilateral;  Surgeon: Moody Aleman MD;  Location: [facility] CATH/EP LAB;  Service: General;  Laterality: N/A;    CREATION, BYPASS, ARTERIAL, FEMORAL TO POPLITEAL, USING IN-SITU VEIN GRAFT Right 6/13/2023    Procedure: CREATION, BYPASS, ARTERIAL, FEMORAL TO POPLITEAL, USING IN-SITU VEIN GRAFT;  Surgeon: Moody Aleman MD;  Location: [facility] OR;  Service: Vascular;  Laterality: Right;    EMBOLECTOMY OR THROMBECTOMY, BLOOD VESSEL, LOWER EXTREMITY Right 6/12/2023    Procedure: EMBOLECTOMY OR THROMBECTOMY, BLOOD VESSEL, LOWER EXTREMITY;  Surgeon: Moody Aleman MD;  Location: [facility] CATH/EP LAB;  Service: Vascular;  Laterality: Right;    EMBOLECTOMY, LOWER EXTREMITY Right 6/13/2023    Procedure: EMBOLECTOMY, LOWER EXTREMITY;  Surgeon: Moody Aleman MD;  Location: [facility] OR;  Service: Vascular;  Laterality: Right;    PTA, ILIAC ARTERY Right 6/12/2023    Procedure: PTA, Iliac Artery;  Surgeon: Moody Aleman MD;  Location: [facility] CATH/EP LAB;  Service: Vascular;  Laterality: Right;    STENT, ILIAC ARTERY Right 6/12/2023    Procedure: Stent, Iliac Artery;  Surgeon: Moody Aleman MD;  Location: [facility] CATH/EP LAB;  Service: Vascular;  Laterality: Right;    THROMBOLYSIS OR THROMBECTOMY, BLOOD VESSEL, LOWER EXTREMITY Right 6/12/2023    Procedure: THROMBOLYSIS OR THROMBECTOMY, BLOOD VESSEL, LOWER EXTREMITY;  Surgeon: Moody Aleman MD;  Location: [facility] CATH/EP LAB;  Service: Vascular;  Laterality: Right;     Family History    None       Tobacco Use    Smoking status: Not on file    Smokeless tobacco: Not on file   Substance and Sexual Activity    Alcohol use: Not on file    Drug use: Not on file    Sexual activity: Not on file     Review of Systems   Constitutional:  Negative for chills and fever.   Musculoskeletal:  Positive for arthralgias and myalgias.    Objective:     Vital Signs (Most Recent):  Temp: 98.7 °F (37.1 °C) (06/15/23 1800)  Pulse: 106 (06/15/23 1645)  Resp: (!) 31 (06/15/23 1645)  BP: 111/68 (06/15/23 1545)  SpO2: 98 % (06/15/23 1500) Vital Signs (24h Range):  Temp:  [98.6 °F (37 °C)-100 °F (37.8 °C)] 98.7 °F (37.1 °C)  Pulse:  [] 106  Resp:  [11-70] 31  SpO2:  [89 %-100 %] 98 %  BP: ()/(48-79) 111/68     Weight: 84.8 kg (186 lb 15.2 oz)  Body mass index is 34.19 kg/m².      Intake/Output Summary (Last 24 hours) at 6/15/2023 1819  Last data filed at 6/15/2023 1547  Gross per 24 hour   Intake 780 ml   Output 1200 ml   Net -420 ml       Physical Exam  Constitutional:       General: She is awake. She is not in acute distress.     Appearance: She is obese. She is not toxic-appearing.   HENT:      Head: Normocephalic and atraumatic.   Cardiovascular:      Pulses:           Dorsalis pedis pulses are 0 on the right side.        Posterior tibial pulses are 0 on the right side.   Pulmonary:      Effort: Pulmonary effort is normal. No tachypnea, bradypnea, accessory muscle usage or respiratory distress.   Abdominal:      General: There is no distension.      Palpations: Abdomen is soft.      Tenderness: There is no abdominal tenderness.       Musculoskeletal:      Cervical back: Neck supple.      Comments: Right leg ischemic knee to toes.    Feet:      Right foot:      Skin integrity: No warmth.   Neurological:      Mental Status: She is alert and oriented to person, place, and time.   Psychiatric:         Behavior: Behavior is cooperative.       Significant Labs:  CBC:   Recent Labs   Lab 06/15/23  0521   WBC 6.78   RBC 2.37*   HGB 7.0*   HCT 21.7*      MCV 92   MCH 29.5   MCHC 32.3     CMP:   Recent Labs   Lab 06/15/23  0521   *   CALCIUM 8.0*      K 3.6   CO2 26      BUN 16   CREATININE 0.8       Significant Diagnostics:  I have reviewed all pertinent imaging results/findings within the past 24 hours.    Assessment/Plan:      Active Diagnoses:    Diagnosis Date Noted POA    PRINCIPAL PROBLEM:  Critical limb ischemia of right lower extremity [I70.221] 06/12/2023 Yes    Colostomy status [Z93.3] 02/23/2023 Not Applicable    Essential (primary) hypertension [I10] 01/09/2023 Yes    Radiation colitis [K52.0] 12/23/2022 Yes    Malignant neoplasm of cervix [C53.9] 11/10/2020 Yes      Problems Resolved During this Admission:   Plan for right AKA tomorrow. Risks and benefits of the operation dicussed.   Type and cross PRBC. Hgb 7 today     Thank you for your consult.     Alexandr Seay III, MD  General Surgery  [facility]

## 2023-06-15 NOTE — PROGRESS NOTES
"[facility] Medicine Progress Note  Patient Name: Carine Darden MRN: [id number]   Patient Class: IP- Inpatient  Length of Stay: 3   Admission Date: 6/12/2023 11:06 AM Attending Physician: Jasmeet Balbuena MD   Primary Care Provider: Inocente Guillermo NP Face-to-Face encounter date: 06/15/2023   Chief Complaint: Leg Pain (Pt R foot is cool to the touch. 10/10 pain stated by pt. Started 9pm last night. Pt states "numbness and tingling" and hip pain. )      Subjective:    Interval History   Pain continues to be severe.  Patient would like amputation as she does not feel she can live with this pain.  Hg has fallen to 7.  Oozing from vascular incision sites.  Low grade temperature of 100.    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  Vascular surgery consulted.  Heparin gtt started. 6/12 right iliac percutaneous  thrombectomy and stent to right common iliac with cath placed for tpa infusion.  She was brought back to the OR with Dr. Aleman and intra operative findings show persistent right popliteal thrombosis with diffuse thrombus in the posterior tibial and peroneal arteries.   Exploration of these vessels revealed changes to suggest these may be chronic. Revascularization was not attempted given concern that it would disrupt more collaterals. Discussed with Dr. Aleman who recommends amputation.     Review of Systems   All other Review of Systems were found to be negative expect for that mentioned already in HPI.     Hospital Course     06/15/2023     chlorhexidine  15 mL Mouth/Throat BID    mupirocin   Nasal BID    pantoprazole  40 mg Oral Daily    pregabalin  150 mg Oral BID       sodium chloride, acetaminophen, acetaminophen, aluminum-magnesium hydroxide-simethicone, dextrose 50%, dextrose 50%, fentaNYL, glucagon (human " "recombinant), glucose, glucose, HYDROmorphone, magnesium sulfate IVPB, magnesium sulfate IVPB, melatonin, naloxone, ondansetron, oxyCODONE, potassium bicarbonate, potassium bicarbonate, potassium bicarbonate, prochlorperazine, simethicone, sodium chloride 0.9%      Objective:   Physical Exam  BP (!) 103/59   Pulse (!) 113   Temp 100 °F (37.8 °C) (Oral)   Resp (!) 28   Ht 5' 2" (1.575 m)   Wt 84.8 kg (186 lb 15.2 oz)   SpO2 95%   BMI 34.19 kg/m²   Constitutional: No distress.   HENT: Atraumatic.   Cardiovascular: Normal rate, regular rhythm and normal heart sounds.   Pulmonary/Chest: Effort normal. Clear to auscultation bilaterally. No wheezes.   Abdominal: Soft. Bowel sounds are normal. Exhibits no distension and no mass. No tenderness  Neurological: Alert.   Skin: Skin is warm and dry.     Labs and Imaging    Significant Labs: All pertinent labs within the past 24 hours have been reviewed.    Significant Imaging: I have reviewed all pertinent imaging results/findings within the past 24 hours.    I have reviewed the Vitals, labs and imaging as above.     Assessment & Plan:   Carine Darden is a 57 y.o. female admitted for    Active Hospital Problems    Diagnosis    *Critical limb ischemia of right lower extremity    Colostomy status    Essential (primary) hypertension    Radiation colitis    Malignant neoplasm of cervix       Plan  Vascular Surgical intervention not successful. General surgery consulted for amputation consideration.  Pain control.  Medication adjusted.  Tranfusing blood as Hg is 7 (from 12), mild tachycardia, soft BPs  Watching low grade temperature.  No overt infection.  AM labs ordered.  Continuing continuous hemodynamic monitoring in the ICU      Active PT: Yes  Active OT: No  Active SLP: No    Core measures:  - Code status:   - Diet: Cardiac  VTE Risk Mitigation (From admission, onward)           Ordered     IP VTE HIGH RISK PATIENT  Once         06/13/23 0705     Place sequential " compression device  Until discontinued         06/13/23 0705     Reason for No Pharmacological VTE Prophylaxis  Once        Question:  Reasons:  Answer:  IV Heparin w/in 24 hrs. Pre or Post-Op    06/13/23 0705                    Discharge Planning:   Discharge Planning   SHONDA: 06/16    Code Status: Full Code   Is the patient medically ready for discharge?: no    Reason for patient still in hospital (select all that apply): Patient trending condition  Discharge Plan A: Home with assistance from family        Above encounter included review of the medical records, interviewing and examining the patient face-to-face, discussion with family and other health care providers, ordering and interpreting lab/test results and formulating a plan of care.     Medical Decision Making:  [] Low Complexity  [] Moderate Complexity  [x] High Complexity    Og Foley MD  [facility] Hospitalist  06/15/2023

## 2023-06-15 NOTE — PLAN OF CARE
"Patient found sitting up in chair. Doing OK on room air 93% (she does desaturate occasionally while sleeping). With some sinus tachycardia 100-110s. The blood pressure has been a bit low MAP had been between 60 and 70, currently 67. She is a little warm 100 oral, but feels cold. She c/o ongoing burning pain to right foot / leg. The foot is pale and mottled, capillary refill around 3 seconds. I could not find a pulse on the foot with doppler. She says she can't feel her foot. She can weakly move her right toes. The ace wrap dressing to right knee and thigh, there seems to be a small amount of sanguinous drainage. Additionally there is some bruising to the right upper thigh.    They delivered a breakfast tray this morning. I don't see any notes that surgery is planned for today, nor an NPO order. I suggested she wait while I confirm with doctor that nothing will be planned for today. She said that she wouldn't do it today anyways because she's waiting for family to come in to talk about her "options". Patient educated, it would delay any potential procedure if she did decide to proceed.    She is voiding storm urine per purewick. I restarted the normal saline, per left ac.    Clinical alarms reviewed and armed. Monitor strip printed and reviewed. The bed is low and alarm on. She was observed able to reposition independently and encouraged to do so, encouraged pulmonary toilet / hygiene.    The ostomy appliance to left abdomen is draining a small amount of liquid / loose stool.  ...    Oxycodone 10mg helped her and she's starting to feel a little better. I assisted her back to bed, requests to wear 2lpm nasal cannula when she is resting.    After lying in bed for about an hour she had increased pain and wanted to dangle her foot again while sitting in the chair so I assisted her back to the chair.  is at bedside now.    ...  With decreased appetite 25-50% of meals.    The dressings to right leg were oozing small " amount of sanguinous drainage (but relatively continuous), and so I reinforced it with gauze pads and tape.    Assist her to change ostomy appliance. Site cleaned. Ostomy is round and bright red / pink, retracted slightly. Stooling soft stool.    Back in bed at end of shift, resting now (says she hasn't slept in a few days). Transfusing blood prbc x1 now, tolerating.

## 2023-06-15 NOTE — CARE UPDATE
06/14/23 2010   PRE-TX-O2   Device (Oxygen Therapy) room air   SpO2 96 %   Pulse Oximetry Type Continuous   $ Pulse Oximetry - Multiple Charge Pulse Oximetry - Multiple   Education   $ Education 15 min

## 2023-06-16 ENCOUNTER — ANESTHESIA EVENT (OUTPATIENT)
Dept: SURGERY | Facility: HOSPITAL | Age: 58
DRG: 271 | End: 2023-06-16
Payer: MEDICARE

## 2023-06-16 ENCOUNTER — ANESTHESIA (OUTPATIENT)
Dept: SURGERY | Facility: HOSPITAL | Age: 58
DRG: 271 | End: 2023-06-16
Payer: MEDICAID

## 2023-06-16 LAB
ABO + RH BLD: NORMAL
ANION GAP SERPL CALC-SCNC: 8 MMOL/L (ref 8–16)
APTT PPP: 24.4 SEC (ref 21–32)
BASOPHILS # BLD AUTO: 0.01 K/UL (ref 0–0.2)
BASOPHILS NFR BLD: 0.2 % (ref 0–1.9)
BLD GP AB SCN CELLS X3 SERPL QL: NORMAL
BUN SERPL-MCNC: 9 MG/DL (ref 6–20)
CALCIUM SERPL-MCNC: 8.1 MG/DL (ref 8.7–10.5)
CHLORIDE SERPL-SCNC: 101 MMOL/L (ref 95–110)
CO2 SERPL-SCNC: 27 MMOL/L (ref 23–29)
CREAT SERPL-MCNC: 0.7 MG/DL (ref 0.5–1.4)
DIFFERENTIAL METHOD: ABNORMAL
EOSINOPHIL # BLD AUTO: 0.2 K/UL (ref 0–0.5)
EOSINOPHIL NFR BLD: 2.6 % (ref 0–8)
ERYTHROCYTE [DISTWIDTH] IN BLOOD BY AUTOMATED COUNT: 14.1 % (ref 11.5–14.5)
EST. GFR  (NO RACE VARIABLE): >60 ML/MIN/1.73 M^2
GLUCOSE SERPL-MCNC: 124 MG/DL (ref 70–110)
HCT VFR BLD AUTO: 22.2 % (ref 37–48.5)
HCT VFR BLD AUTO: 24.4 % (ref 37–48.5)
HGB BLD-MCNC: 7.2 G/DL (ref 12–16)
HGB BLD-MCNC: 8 G/DL (ref 12–16)
IMM GRANULOCYTES # BLD AUTO: 0.03 K/UL (ref 0–0.04)
IMM GRANULOCYTES NFR BLD AUTO: 0.5 % (ref 0–0.5)
INR PPP: 0.9 (ref 0.8–1.2)
LYMPHOCYTES # BLD AUTO: 1.1 K/UL (ref 1–4.8)
LYMPHOCYTES NFR BLD: 17.6 % (ref 18–48)
MAGNESIUM SERPL-MCNC: 1.9 MG/DL (ref 1.6–2.6)
MCH RBC QN AUTO: 29.3 PG (ref 27–31)
MCHC RBC AUTO-ENTMCNC: 32.4 G/DL (ref 32–36)
MCV RBC AUTO: 90 FL (ref 82–98)
MONOCYTES # BLD AUTO: 0.7 K/UL (ref 0.3–1)
MONOCYTES NFR BLD: 10.8 % (ref 4–15)
NEUTROPHILS # BLD AUTO: 4.2 K/UL (ref 1.8–7.7)
NEUTROPHILS NFR BLD: 68.3 % (ref 38–73)
NRBC BLD-RTO: 0 /100 WBC
PLATELET # BLD AUTO: 162 K/UL (ref 150–450)
PMV BLD AUTO: 10.1 FL (ref 9.2–12.9)
POTASSIUM SERPL-SCNC: 3.7 MMOL/L (ref 3.5–5.1)
PROTHROMBIN TIME: 10.3 SEC (ref 9–12.5)
RBC # BLD AUTO: 2.46 M/UL (ref 4–5.4)
SODIUM SERPL-SCNC: 136 MMOL/L (ref 136–145)
SPECIMEN OUTDATE: NORMAL
WBC # BLD AUTO: 6.18 K/UL (ref 3.9–12.7)

## 2023-06-16 PROCEDURE — 88307 TISSUE EXAM BY PATHOLOGIST: CPT | Mod: TC

## 2023-06-16 PROCEDURE — 25000003 PHARM REV CODE 250: Performed by: INTERNAL MEDICINE

## 2023-06-16 PROCEDURE — 85025 COMPLETE CBC W/AUTO DIFF WBC: CPT | Performed by: SURGERY

## 2023-06-16 PROCEDURE — 63600175 PHARM REV CODE 636 W HCPCS: Performed by: INTERNAL MEDICINE

## 2023-06-16 PROCEDURE — 94799 UNLISTED PULMONARY SVC/PX: CPT

## 2023-06-16 PROCEDURE — D9220A PRA ANESTHESIA: ICD-10-PCS | Mod: ANES,,, | Performed by: ANESTHESIOLOGY

## 2023-06-16 PROCEDURE — 25000003 PHARM REV CODE 250: Performed by: SURGERY

## 2023-06-16 PROCEDURE — 94761 N-INVAS EAR/PLS OXIMETRY MLT: CPT

## 2023-06-16 PROCEDURE — 27590 PR AMPUTATE THIGH,THRU FEMUR: ICD-10-PCS | Mod: RT,,, | Performed by: SURGERY

## 2023-06-16 PROCEDURE — 83735 ASSAY OF MAGNESIUM: CPT | Performed by: SURGERY

## 2023-06-16 PROCEDURE — 85610 PROTHROMBIN TIME: CPT | Performed by: INTERNAL MEDICINE

## 2023-06-16 PROCEDURE — 85730 THROMBOPLASTIN TIME PARTIAL: CPT | Performed by: INTERNAL MEDICINE

## 2023-06-16 PROCEDURE — 25000003 PHARM REV CODE 250: Performed by: NURSE ANESTHETIST, CERTIFIED REGISTERED

## 2023-06-16 PROCEDURE — 86900 BLOOD TYPING SEROLOGIC ABO: CPT | Performed by: INTERNAL MEDICINE

## 2023-06-16 PROCEDURE — P9016 RBC LEUKOCYTES REDUCED: HCPCS | Performed by: SURGERY

## 2023-06-16 PROCEDURE — 27201423 OPTIME MED/SURG SUP & DEVICES STERILE SUPPLY: Performed by: SURGERY

## 2023-06-16 PROCEDURE — 63600175 PHARM REV CODE 636 W HCPCS: Performed by: NURSE ANESTHETIST, CERTIFIED REGISTERED

## 2023-06-16 PROCEDURE — 80048 BASIC METABOLIC PNL TOTAL CA: CPT | Performed by: SURGERY

## 2023-06-16 PROCEDURE — 20000000 HC ICU ROOM

## 2023-06-16 PROCEDURE — D9220A PRA ANESTHESIA: Mod: CRNA,,, | Performed by: NURSE ANESTHETIST, CERTIFIED REGISTERED

## 2023-06-16 PROCEDURE — 85018 HEMOGLOBIN: CPT | Performed by: INTERNAL MEDICINE

## 2023-06-16 PROCEDURE — 85014 HEMATOCRIT: CPT | Performed by: INTERNAL MEDICINE

## 2023-06-16 PROCEDURE — 63600175 PHARM REV CODE 636 W HCPCS: Performed by: ANESTHESIOLOGY

## 2023-06-16 PROCEDURE — 37000008 HC ANESTHESIA 1ST 15 MINUTES: Performed by: SURGERY

## 2023-06-16 PROCEDURE — D9220A PRA ANESTHESIA: ICD-10-PCS | Mod: CRNA,,, | Performed by: NURSE ANESTHETIST, CERTIFIED REGISTERED

## 2023-06-16 PROCEDURE — 36430 TRANSFUSION BLD/BLD COMPNT: CPT

## 2023-06-16 PROCEDURE — 27590 AMPUTATE LEG AT THIGH: CPT | Mod: RT,,, | Performed by: SURGERY

## 2023-06-16 PROCEDURE — 36000711: Performed by: SURGERY

## 2023-06-16 PROCEDURE — 37000009 HC ANESTHESIA EA ADD 15 MINS: Performed by: SURGERY

## 2023-06-16 PROCEDURE — 36415 COLL VENOUS BLD VENIPUNCTURE: CPT | Performed by: INTERNAL MEDICINE

## 2023-06-16 PROCEDURE — 63600175 PHARM REV CODE 636 W HCPCS: Performed by: SURGERY

## 2023-06-16 PROCEDURE — D9220A PRA ANESTHESIA: Mod: ANES,,, | Performed by: ANESTHESIOLOGY

## 2023-06-16 PROCEDURE — 36000710: Performed by: SURGERY

## 2023-06-16 RX ORDER — FAMOTIDINE 10 MG/ML
INJECTION INTRAVENOUS
Status: DISCONTINUED | OUTPATIENT
Start: 2023-06-16 | End: 2023-06-16

## 2023-06-16 RX ORDER — ONDANSETRON 2 MG/ML
INJECTION INTRAMUSCULAR; INTRAVENOUS
Status: DISCONTINUED | OUTPATIENT
Start: 2023-06-16 | End: 2023-06-16

## 2023-06-16 RX ORDER — ONDANSETRON 2 MG/ML
4 INJECTION INTRAMUSCULAR; INTRAVENOUS EVERY 6 HOURS PRN
Status: DISCONTINUED | OUTPATIENT
Start: 2023-06-16 | End: 2023-06-16 | Stop reason: SDUPTHER

## 2023-06-16 RX ORDER — KETAMINE HYDROCHLORIDE 50 MG/ML
INJECTION, SOLUTION INTRAMUSCULAR; INTRAVENOUS
Status: DISCONTINUED | OUTPATIENT
Start: 2023-06-16 | End: 2023-06-16

## 2023-06-16 RX ORDER — ROCURONIUM BROMIDE 10 MG/ML
INJECTION, SOLUTION INTRAVENOUS
Status: DISCONTINUED | OUTPATIENT
Start: 2023-06-16 | End: 2023-06-16

## 2023-06-16 RX ORDER — FENTANYL CITRATE 50 UG/ML
INJECTION, SOLUTION INTRAMUSCULAR; INTRAVENOUS
Status: DISCONTINUED | OUTPATIENT
Start: 2023-06-16 | End: 2023-06-16

## 2023-06-16 RX ORDER — LIDOCAINE HYDROCHLORIDE 10 MG/ML
INJECTION, SOLUTION INTRAVENOUS
Status: DISCONTINUED | OUTPATIENT
Start: 2023-06-16 | End: 2023-06-16

## 2023-06-16 RX ORDER — DEXMEDETOMIDINE HYDROCHLORIDE 100 UG/ML
INJECTION, SOLUTION INTRAVENOUS
Status: DISCONTINUED | OUTPATIENT
Start: 2023-06-16 | End: 2023-06-16

## 2023-06-16 RX ORDER — PROPOFOL 10 MG/ML
VIAL (ML) INTRAVENOUS
Status: DISCONTINUED | OUTPATIENT
Start: 2023-06-16 | End: 2023-06-16

## 2023-06-16 RX ORDER — BUPIVACAINE HYDROCHLORIDE 2.5 MG/ML
INJECTION, SOLUTION EPIDURAL; INFILTRATION; INTRACAUDAL
Status: DISCONTINUED | OUTPATIENT
Start: 2023-06-16 | End: 2023-06-16 | Stop reason: HOSPADM

## 2023-06-16 RX ORDER — DOCUSATE SODIUM 100 MG/1
100 CAPSULE, LIQUID FILLED ORAL 2 TIMES DAILY
Status: DISCONTINUED | OUTPATIENT
Start: 2023-06-16 | End: 2023-06-21 | Stop reason: HOSPADM

## 2023-06-16 RX ORDER — HYDROMORPHONE HYDROCHLORIDE 1 MG/ML
0.2 INJECTION, SOLUTION INTRAMUSCULAR; INTRAVENOUS; SUBCUTANEOUS
Status: DISCONTINUED | OUTPATIENT
Start: 2023-06-16 | End: 2023-06-17

## 2023-06-16 RX ORDER — MIDAZOLAM HYDROCHLORIDE 1 MG/ML
INJECTION INTRAMUSCULAR; INTRAVENOUS
Status: DISCONTINUED | OUTPATIENT
Start: 2023-06-16 | End: 2023-06-16

## 2023-06-16 RX ADMIN — CHLORHEXIDINE GLUCONATE 15 ML: 1.2 RINSE ORAL at 08:06

## 2023-06-16 RX ADMIN — PROPOFOL 100 MG: 10 INJECTION, EMULSION INTRAVENOUS at 01:06

## 2023-06-16 RX ADMIN — ACETAMINOPHEN 1000 MG: 500 TABLET, FILM COATED ORAL at 08:06

## 2023-06-16 RX ADMIN — HYDROMORPHONE HYDROCHLORIDE 0.2 MG: 1 INJECTION, SOLUTION INTRAMUSCULAR; INTRAVENOUS; SUBCUTANEOUS at 04:06

## 2023-06-16 RX ADMIN — HYDROMORPHONE HYDROCHLORIDE 1 MG: 1 INJECTION, SOLUTION INTRAMUSCULAR; INTRAVENOUS; SUBCUTANEOUS at 10:06

## 2023-06-16 RX ADMIN — HYDROMORPHONE HYDROCHLORIDE 1 MG: 1 INJECTION, SOLUTION INTRAMUSCULAR; INTRAVENOUS; SUBCUTANEOUS at 04:06

## 2023-06-16 RX ADMIN — OXYCODONE HYDROCHLORIDE 15 MG: 5 TABLET ORAL at 07:06

## 2023-06-16 RX ADMIN — KETAMINE HYDROCHLORIDE 30 MG: 50 INJECTION INTRAMUSCULAR; INTRAVENOUS at 01:06

## 2023-06-16 RX ADMIN — MUPIROCIN 1 G: 20 OINTMENT TOPICAL at 08:06

## 2023-06-16 RX ADMIN — FENTANYL CITRATE 100 MCG: 50 INJECTION, SOLUTION INTRAMUSCULAR; INTRAVENOUS at 01:06

## 2023-06-16 RX ADMIN — FENTANYL CITRATE 25 MCG: 50 INJECTION, SOLUTION INTRAMUSCULAR; INTRAVENOUS at 04:06

## 2023-06-16 RX ADMIN — OXYCODONE HYDROCHLORIDE 15 MG: 5 TABLET ORAL at 08:06

## 2023-06-16 RX ADMIN — DEXMEDETOMIDINE HYDROCHLORIDE 20 MCG: 100 INJECTION, SOLUTION INTRAVENOUS at 03:06

## 2023-06-16 RX ADMIN — SUGAMMADEX 200 MG: 100 INJECTION, SOLUTION INTRAVENOUS at 03:06

## 2023-06-16 RX ADMIN — PANTOPRAZOLE SODIUM 40 MG: 40 TABLET, DELAYED RELEASE ORAL at 06:06

## 2023-06-16 RX ADMIN — OXYCODONE HYDROCHLORIDE 15 MG: 5 TABLET ORAL at 03:06

## 2023-06-16 RX ADMIN — HYDROMORPHONE HYDROCHLORIDE 0.2 MG: 1 INJECTION, SOLUTION INTRAMUSCULAR; INTRAVENOUS; SUBCUTANEOUS at 05:06

## 2023-06-16 RX ADMIN — DOCUSATE SODIUM 100 MG: 100 CAPSULE, LIQUID FILLED ORAL at 08:06

## 2023-06-16 RX ADMIN — ACETAMINOPHEN 1000 MG: 500 TABLET, FILM COATED ORAL at 07:06

## 2023-06-16 RX ADMIN — SODIUM CHLORIDE: 0.9 INJECTION, SOLUTION INTRAVENOUS at 01:06

## 2023-06-16 RX ADMIN — FAMOTIDINE 20 MG: 10 INJECTION, SOLUTION INTRAVENOUS at 01:06

## 2023-06-16 RX ADMIN — DEXTROSE 2 G: 50 INJECTION, SOLUTION INTRAVENOUS at 01:06

## 2023-06-16 RX ADMIN — MIDAZOLAM HYDROCHLORIDE 2 MG: 1 INJECTION, SOLUTION INTRAMUSCULAR; INTRAVENOUS at 01:06

## 2023-06-16 RX ADMIN — DEXMEDETOMIDINE HYDROCHLORIDE 20 MCG: 100 INJECTION, SOLUTION INTRAVENOUS at 01:06

## 2023-06-16 RX ADMIN — HYDROMORPHONE HYDROCHLORIDE 1 MG: 1 INJECTION, SOLUTION INTRAMUSCULAR; INTRAVENOUS; SUBCUTANEOUS at 06:06

## 2023-06-16 RX ADMIN — ROCURONIUM BROMIDE 50 MG: 10 INJECTION, SOLUTION INTRAVENOUS at 01:06

## 2023-06-16 RX ADMIN — SODIUM CHLORIDE: 0.9 INJECTION, SOLUTION INTRAVENOUS at 03:06

## 2023-06-16 RX ADMIN — KETAMINE HYDROCHLORIDE 20 MG: 50 INJECTION INTRAMUSCULAR; INTRAVENOUS at 02:06

## 2023-06-16 RX ADMIN — ONDANSETRON 4 MG: 2 INJECTION INTRAMUSCULAR; INTRAVENOUS at 01:06

## 2023-06-16 RX ADMIN — HYDROMORPHONE HYDROCHLORIDE 0.2 MG: 1 INJECTION, SOLUTION INTRAMUSCULAR; INTRAVENOUS; SUBCUTANEOUS at 06:06

## 2023-06-16 RX ADMIN — HYDROMORPHONE HYDROCHLORIDE 1 MG: 1 INJECTION, SOLUTION INTRAMUSCULAR; INTRAVENOUS; SUBCUTANEOUS at 01:06

## 2023-06-16 RX ADMIN — LIDOCAINE HYDROCHLORIDE 100 MG: 10 INJECTION, SOLUTION INTRAVENOUS at 01:06

## 2023-06-16 RX ADMIN — PREGABALIN 150 MG: 75 CAPSULE ORAL at 08:06

## 2023-06-16 NOTE — PLAN OF CARE
Patient found sitting at side of bed. She said she slept pretty good last night. She had a bit of a fever last night 100.8. She does have a cough. Doing OK on room air 93% (she does desaturate occasionally while sleeping). Currently sinus rhythm 80s, with some sinus tachycardia [id number]. Blood pressure ok now map 83, yesterday the blood pressure had been a bit low. She c/o ongoing burning pain to right foot / leg. The foot is pale. I could not find a pulse on the foot with doppler. She says she can't feel her foot. She can weakly move her right toes. The ace wrap dressing to right knee and thigh, yesterday I had reinforced the dressing with some gauze as there was some sanguinous drainage. There remains with some edema, mostly to right upper thigh, with some bruising as before.    She is voiding storm urine per purewick.    Clinical alarms reviewed and armed. Monitor strip printed and reviewed. The bed is low and alarm on. She was observed able to reposition independently and encouraged to do so, encouraged pulmonary toilet / hygiene. Gave her incentive spirometer and instructed in its use.     The ostomy appliance to left abdomen is draining a small amount of loose stool.    Had received prbc x1 yesterday evening.  ...  Dr Seay spoke with the patient yesterday about the procedure. I asked if I should fill out a consent form and he said surgery team would take care of that. Notified receiving nurses who picked up the patient that consents were still pending.    Dr danielle wanted me to give additional unit of blood and so I started it as soon as I was able, but they picked her up early and so it was still finishing up at the time.    ...    Patient received from surgery with opa in place, removed at bedside and patient woke up but was still groggy. As she awoke, she seemed to be in a lot of pain, was yelling, and had to give her the repeated doses of dilaudid for her to be comfortable. She was talking with  me, Vital signs were wnl. After she dozed off and then awoke she had no memory of that and generally was feeling better. The surgery site seems good, didn't notice any bleeding. The provena wound vac wasn't sealed and had to be manipulated at bedside by md but is working well now and is compressed.    Rechecking H&H now.

## 2023-06-16 NOTE — ANESTHESIA PROCEDURE NOTES
Intubation    Date/Time: 6/16/2023 1:49 PM  Performed by: Elise Richter CRNA  Authorized by: Gustavo Duarte MD     Intubation:     Induction:  Intravenous    Intubated:  Postinduction    Mask Ventilation:  Easy mask    Attempts:  1    Attempted By:  CRNA    Method of Intubation:  Direct    Blade:  Stringer 3    Laryngeal View Grade: Grade I - full view of cords      Difficult Airway Encountered?: No      Complications:  None    Airway Device:  Oral endotracheal tube    Airway Device Size:  7.5    Style/Cuff Inflation:  Cuffed (inflated to minimal occlusive pressure)    Tube secured:  21    Secured at:  The lips    Placement Verified By:  Capnometry    Complicating Factors:  None    Findings Post-Intubation:  BS equal bilateral and atraumatic/condition of teeth unchanged

## 2023-06-16 NOTE — ANESTHESIA PREPROCEDURE EVALUATION
06/16/2023  Carine Darden is a 57 y.o., female.    Patient Active Problem List   Diagnosis    Colostomy status    Essential (primary) hypertension    Malignant neoplasm of cervix    Radiation colitis    Critical limb ischemia of right lower extremity       Past Surgical History:   Procedure Laterality Date    ANGIOGRAM, ABDOMINAL AORTA W/ EXTREMITY RUNOFF Right 6/12/2023    Procedure: Angiogram, Abdominal Aorta W/ Extremity Runoff;  Surgeon: Moody Aleman MD;  Location: [facility] CATH/EP LAB;  Service: Vascular;  Laterality: Right;    ANGIOGRAPHY OF LOWER EXTREMITY N/A 6/13/2023    Procedure: Angiogram Extremity Unilateral;  Surgeon: Moody Aleman MD;  Location: [facility] CATH/EP LAB;  Service: General;  Laterality: N/A;    CREATION, BYPASS, ARTERIAL, FEMORAL TO POPLITEAL, USING IN-SITU VEIN GRAFT Right 6/13/2023    Procedure: CREATION, BYPASS, ARTERIAL, FEMORAL TO POPLITEAL, USING IN-SITU VEIN GRAFT;  Surgeon: Moody Aleman MD;  Location: [facility] OR;  Service: Vascular;  Laterality: Right;    EMBOLECTOMY OR THROMBECTOMY, BLOOD VESSEL, LOWER EXTREMITY Right 6/12/2023    Procedure: EMBOLECTOMY OR THROMBECTOMY, BLOOD VESSEL, LOWER EXTREMITY;  Surgeon: Moody Aleman MD;  Location: [facility] CATH/EP LAB;  Service: Vascular;  Laterality: Right;    EMBOLECTOMY, LOWER EXTREMITY Right 6/13/2023    Procedure: EMBOLECTOMY, LOWER EXTREMITY;  Surgeon: Moody Aleman MD;  Location: [facility] OR;  Service: Vascular;  Laterality: Right;    PTA, ILIAC ARTERY Right 6/12/2023    Procedure: PTA, Iliac Artery;  Surgeon: Moody Aleman MD;  Location: [facility] CATH/EP LAB;  Service: Vascular;  Laterality: Right;    STENT, ILIAC ARTERY Right 6/12/2023    Procedure: Stent, Iliac Artery;  Surgeon: Moody Aleman MD;  Location: [facility] CATH/EP LAB;  Service: Vascular;  Laterality: Right;    THROMBOLYSIS OR THROMBECTOMY, BLOOD VESSEL,  LOWER EXTREMITY Right 6/12/2023    Procedure: THROMBOLYSIS OR THROMBECTOMY, BLOOD VESSEL, LOWER EXTREMITY;  Surgeon: Moody Aleman MD;  Location: [facility] CATH/EP LAB;  Service: Vascular;  Laterality: Right;        Tobacco Use:  The patient  has no history on file for tobacco use.     Results for orders placed or performed during the hospital encounter of 06/12/23   EKG 12-lead    Collection Time: 06/12/23 12:29 PM    Narrative    Test Reason : R20.9,    Vent. Rate : 070 BPM     Atrial Rate : 070 BPM     P-R Int : 140 ms          QRS Dur : 080 ms      QT Int : 406 ms       P-R-T Axes : 011 006 006 degrees     QTc Int : 438 ms    Normal sinus rhythm  Normal ECG  No previous ECGs available  Confirmed by Al COPPOLA, Diogenes HAIR ([id number]) on 6/13/2023 6:45:23 PM    Referred By: [email or web address]   SELF           Confirmed By:Diogenes Melendez MD             Lab Results   Component Value Date    WBC 6.18 06/16/2023    HGB 7.2 (L) 06/16/2023    HCT 22.2 (L) 06/16/2023    MCV 90 06/16/2023     06/16/2023     BMP  Lab Results   Component Value Date     06/16/2023    K 3.7 06/16/2023     06/16/2023    CO2 27 06/16/2023    BUN 9 06/16/2023    CREATININE 0.7 06/16/2023    CALCIUM 8.1 (L) 06/16/2023    ANIONGAP 8 06/16/2023     (H) 06/16/2023     (H) 06/15/2023     (H) 06/14/2023       No results found for this or any previous visit.            Pre-op Assessment    I have reviewed the Patient Summary Reports.     I have reviewed the Nursing Notes. I have reviewed the NPO Status.   I have reviewed the Medications.     Review of Systems  Anesthesia Hx:  No problems with previous Anesthesia  Denies Family Hx of Anesthesia complications.   Denies Personal Hx of Anesthesia complications.   Social:  Non-Smoker    Hematology/Oncology:         -- Anemia (s/p transfusion): --  Cancer in past history (cervical  cancer s/p radiation):  chemotherapy and surgery    EENT/Dental:EENT/Dental Normal   Cardiovascular:    Hypertension, poorly controlled PVD Right leg pain/cold foot.   Pulmonary:  Pulmonary Normal    Renal/:  Renal/ Normal     Hepatic/GI:  Hepatic/GI Normal S/p colostomy    Musculoskeletal:  Musculoskeletal Normal    Neurological:  Neurology Normal    Endocrine:  Endocrine Normal    Psych:  Psychiatric Normal           Physical Exam  General: Well nourished and Anxious    Airway:  Mallampati: III   Mouth Opening: Normal  TM Distance: Normal  Tongue: Normal  Neck ROM: Normal ROM    Dental:  Intact    Chest/Lungs:  Clear to auscultation, Normal Respiratory Rate    Heart:  Rate: Normal  Rhythm: Regular Rhythm        Anesthesia Plan  Type of Anesthesia, risks & benefits discussed:    Anesthesia Type: Gen ETT  Intra-op Monitoring Plan: Standard ASA Monitors  Post Op Pain Control Plan: IV/PO Opioids PRN and multimodal analgesia  Induction:  IV  Airway Plan: Video  Informed Consent: Informed consent signed with the Patient and all parties understand the risks and agree with anesthesia plan.  All questions answered.   ASA Score: 3 Emergent  Anesthesia Plan Notes: GETA  No Decadron  Multimodal analgesia with ofirmev 1000mg, and ketamine 50 mg.  PONV prophylaxis with Pepcid 20 mg IV, and Zofran 4 mg IV.  Blood available if needed.    Ready For Surgery From Anesthesia Perspective.     .

## 2023-06-16 NOTE — CARE UPDATE
06/15/23 2044   PRE-TX-O2   Device (Oxygen Therapy) room air   Pulse Oximetry Type Continuous   $ Pulse Oximetry - Multiple Charge Pulse Oximetry - Multiple   Education   $ Education Other (see comment);15 min

## 2023-06-16 NOTE — PT/OT/SLP PROGRESS
Physical Therapy      Patient Name:  Carine Darden   MRN:  [id number]    Patient not seen today secondary to Other (Comment) (pt undergoing AKA this date will need new order s/ps urgery when medically appropriate). Will follow-up upon receiving new order s/p surgery.

## 2023-06-16 NOTE — OP NOTE
DATE OF PROCEDURE: 06/16/2023    PREOPERATIVE DIAGNOSIS: Critical limb ischemia -right lower leg    POSTOPERATIVE DIAGNOSIS: Same    PROCEDURE: Right above knee amputation    SURGEON: Gagan Gamez M.D    ASSISTANT: None    ANESTHESIA: General    ESTIMATED BLOOD LOSS: 100 cc    SPECIMEN: Right leg    CONDITION: Stable    COMPLICATIONS: None    FINDINGS:   1. Tissue was edematous but viable at level of amputation     INDICATIONS: The patient is a 57-year-old female who presented with acute critical laid ischemia of the right lower leg. Underwent extensive attempt at revascularization however bypass occluded. Recommendation for above-knee amputation was made due to inadequate perfusion below the knee. Patient was counseled on options and agreed proceed.    PROCEDURE IN DETAIL: Patient was taken to the operating room and placed in the supine position. General anesthesia was induced without complication.  She received perioperative Ancef. The patient had existing bandages on her on a right lower extremity from previous vascular intervention.  These were taken down.  Patient had staple line closure of incisions along the medial aspect of the upper leg and down along the lower leg. The right lower extremity was prepped with Betadine and draped in sterile fashion including use of an impervious stockinette over the distal leg. Approximally 5 cm above the patella was chosen as our amputation site. A fishmouth incision was marked. Of note the medial aspect of the fishmouth closure would be continuous with the previous left medial thigh incision which was closed with staples currently. I incised the skin sharply using a scalpel and then used electrocautery to circumferentially dissect through the dermis. Began dissection on the anterior using electrocautery to dissect through subcutaneous tissue. Dissection was continued down through the fascia of the overlying muscle.  When the fascia was incised there was significant  bulging and edema of the underlying muscle but the muscle appeared healthy and viable. We continued our anterior dissection in the medial and lateral aspects of the femur and were able to circumferentially dissect around the femur. Small vessels were suture ligated during our dissection. Using a periosteal elevator we are able to dissect superiorly along the femur approximally 4 cm. I then used a Gigli saw to transect the femur and the edges were rasped smooth. As we continued our dissection through the muscle belly we identified the SFA and associated vein.  Along this area was the vein graft which was occluded.  There was also some hematoma and seroma along this tunneled track and in the dissection field along the medial incision. Each of the vessels were individually suture ligated. The seroma and hematoma did not appear infected and were suctioned clean. We continued our dissection until the sac nerve was identified and was subsequently suture ligated and then transected sharply. We continued our dissection through the muscle belly and the posterior subcutaneous tissue until the specimen was removed.  It was then passed off for pathology. We then assured adequate hemostasis with use of electrocautery. The muscle belly again was edematous but appeared healthy and viable with no evidence of infection or necrosis. The wound was then irrigated.  I then began closing muscle fascia from posterior component to the anterior component covering the femoral transection site. We had good coverage of the femur. The deep dermal layer was closed with interrupted Vicryl sutures. Skin staples were then used close the incision. A Prevena wound control device was placed over our incision to help with some of the edematous fluid. A Mepilex Ag dressing was applied over the previous medial thigh incision site.  The patient was then aroused from sedation, extubated taken to the ICU in stable condition having suffered no complications.   All counts were correct x2 the case.  I was present scrubbed throughout all portions of the case.    DISPO: ICU care

## 2023-06-16 NOTE — TRANSFER OF CARE
"Anesthesia Transfer of Care Note    Patient: Carine Darden    Procedure(s) Performed: Procedure(s) (LRB):  AMPUTATION, ABOVE KNEE (Right)    Patient location: ICU    Anesthesia Type: general    Transport from OR: Continuous ECG monitoring in transport. Continuous SpO2 monitoring in transport. Transported from OR on 2-3 L/min O2 by NC with adequate spontaneous ventilation    Post pain: adequate analgesia    Post assessment: no apparent anesthetic complications    Post vital signs: stable    Level of consciousness: awake    Nausea/Vomiting: no nausea/vomiting    Complications: none    Transfer of care protocol was followed      Last vitals:   Visit Vitals  BP (!) 89/62   Pulse 92   Temp 37 °C (98.6 °F) (Oral)   Resp (!) 30   Ht 5' 2" (1.575 m)   Wt 96.3 kg (212 lb 4.9 oz)   SpO2 (!) 94%   BMI 38.83 kg/m²     "

## 2023-06-17 LAB
ANION GAP SERPL CALC-SCNC: 8 MMOL/L (ref 8–16)
BASOPHILS # BLD AUTO: 0.01 K/UL (ref 0–0.2)
BASOPHILS NFR BLD: 0.2 % (ref 0–1.9)
BUN SERPL-MCNC: 11 MG/DL (ref 6–20)
CALCIUM SERPL-MCNC: 7.9 MG/DL (ref 8.7–10.5)
CHLORIDE SERPL-SCNC: 102 MMOL/L (ref 95–110)
CO2 SERPL-SCNC: 28 MMOL/L (ref 23–29)
CREAT SERPL-MCNC: 0.7 MG/DL (ref 0.5–1.4)
DIFFERENTIAL METHOD: ABNORMAL
EOSINOPHIL # BLD AUTO: 0.2 K/UL (ref 0–0.5)
EOSINOPHIL NFR BLD: 3.4 % (ref 0–8)
ERYTHROCYTE [DISTWIDTH] IN BLOOD BY AUTOMATED COUNT: 14.5 % (ref 11.5–14.5)
EST. GFR  (NO RACE VARIABLE): >60 ML/MIN/1.73 M^2
GLUCOSE SERPL-MCNC: 144 MG/DL (ref 70–110)
HCT VFR BLD AUTO: 24.1 % (ref 37–48.5)
HGB BLD-MCNC: 7.6 G/DL (ref 12–16)
IMM GRANULOCYTES # BLD AUTO: 0.02 K/UL (ref 0–0.04)
IMM GRANULOCYTES NFR BLD AUTO: 0.4 % (ref 0–0.5)
LYMPHOCYTES # BLD AUTO: 0.8 K/UL (ref 1–4.8)
LYMPHOCYTES NFR BLD: 15.8 % (ref 18–48)
MAGNESIUM SERPL-MCNC: 1.9 MG/DL (ref 1.6–2.6)
MCH RBC QN AUTO: 28.6 PG (ref 27–31)
MCHC RBC AUTO-ENTMCNC: 31.5 G/DL (ref 32–36)
MCV RBC AUTO: 91 FL (ref 82–98)
MONOCYTES # BLD AUTO: 0.5 K/UL (ref 0.3–1)
MONOCYTES NFR BLD: 8.9 % (ref 4–15)
NEUTROPHILS # BLD AUTO: 3.8 K/UL (ref 1.8–7.7)
NEUTROPHILS NFR BLD: 71.3 % (ref 38–73)
NRBC BLD-RTO: 0 /100 WBC
PLATELET # BLD AUTO: 163 K/UL (ref 150–450)
PMV BLD AUTO: 10.4 FL (ref 9.2–12.9)
POTASSIUM SERPL-SCNC: 3.7 MMOL/L (ref 3.5–5.1)
RBC # BLD AUTO: 2.66 M/UL (ref 4–5.4)
SODIUM SERPL-SCNC: 138 MMOL/L (ref 136–145)
WBC # BLD AUTO: 5.26 K/UL (ref 3.9–12.7)

## 2023-06-17 PROCEDURE — 83735 ASSAY OF MAGNESIUM: CPT | Performed by: SURGERY

## 2023-06-17 PROCEDURE — 25000003 PHARM REV CODE 250: Performed by: SURGERY

## 2023-06-17 PROCEDURE — 12000002 HC ACUTE/MED SURGE SEMI-PRIVATE ROOM

## 2023-06-17 PROCEDURE — 36415 COLL VENOUS BLD VENIPUNCTURE: CPT | Performed by: SURGERY

## 2023-06-17 PROCEDURE — 27000221 HC OXYGEN, UP TO 24 HOURS

## 2023-06-17 PROCEDURE — 20000000 HC ICU ROOM

## 2023-06-17 PROCEDURE — 63600175 PHARM REV CODE 636 W HCPCS: Performed by: INTERNAL MEDICINE

## 2023-06-17 PROCEDURE — 80048 BASIC METABOLIC PNL TOTAL CA: CPT | Performed by: SURGERY

## 2023-06-17 PROCEDURE — 99900035 HC TECH TIME PER 15 MIN (STAT)

## 2023-06-17 PROCEDURE — 97166 OT EVAL MOD COMPLEX 45 MIN: CPT

## 2023-06-17 PROCEDURE — 25000003 PHARM REV CODE 250: Performed by: INTERNAL MEDICINE

## 2023-06-17 PROCEDURE — 63600175 PHARM REV CODE 636 W HCPCS: Performed by: SURGERY

## 2023-06-17 PROCEDURE — 99900031 HC PATIENT EDUCATION (STAT)

## 2023-06-17 PROCEDURE — 94761 N-INVAS EAR/PLS OXIMETRY MLT: CPT

## 2023-06-17 PROCEDURE — 97116 GAIT TRAINING THERAPY: CPT

## 2023-06-17 PROCEDURE — 97161 PT EVAL LOW COMPLEX 20 MIN: CPT

## 2023-06-17 PROCEDURE — 94799 UNLISTED PULMONARY SVC/PX: CPT

## 2023-06-17 PROCEDURE — 85025 COMPLETE CBC W/AUTO DIFF WBC: CPT | Performed by: SURGERY

## 2023-06-17 RX ADMIN — DOCUSATE SODIUM 100 MG: 100 CAPSULE, LIQUID FILLED ORAL at 08:06

## 2023-06-17 RX ADMIN — PREGABALIN 150 MG: 75 CAPSULE ORAL at 08:06

## 2023-06-17 RX ADMIN — ACETAMINOPHEN 650 MG: 325 TABLET ORAL at 11:06

## 2023-06-17 RX ADMIN — OXYCODONE HYDROCHLORIDE 15 MG: 5 TABLET ORAL at 03:06

## 2023-06-17 RX ADMIN — OXYCODONE HYDROCHLORIDE 15 MG: 5 TABLET ORAL at 05:06

## 2023-06-17 RX ADMIN — HYDROMORPHONE HYDROCHLORIDE 1 MG: 1 INJECTION, SOLUTION INTRAMUSCULAR; INTRAVENOUS; SUBCUTANEOUS at 12:06

## 2023-06-17 RX ADMIN — OXYCODONE HYDROCHLORIDE 15 MG: 5 TABLET ORAL at 08:06

## 2023-06-17 RX ADMIN — MUPIROCIN 1 G: 20 OINTMENT TOPICAL at 08:06

## 2023-06-17 RX ADMIN — HYDROMORPHONE HYDROCHLORIDE 1 MG: 1 INJECTION, SOLUTION INTRAMUSCULAR; INTRAVENOUS; SUBCUTANEOUS at 05:06

## 2023-06-17 RX ADMIN — SODIUM CHLORIDE: 0.9 INJECTION, SOLUTION INTRAVENOUS at 11:06

## 2023-06-17 RX ADMIN — ACETAMINOPHEN 650 MG: 325 TABLET ORAL at 03:06

## 2023-06-17 RX ADMIN — CHLORHEXIDINE GLUCONATE 15 ML: 1.2 RINSE ORAL at 08:06

## 2023-06-17 RX ADMIN — PANTOPRAZOLE SODIUM 40 MG: 40 TABLET, DELAYED RELEASE ORAL at 08:06

## 2023-06-17 RX ADMIN — ACETAMINOPHEN 650 MG: 325 TABLET ORAL at 08:06

## 2023-06-17 RX ADMIN — PROCHLORPERAZINE EDISYLATE 5 MG: 5 INJECTION INTRAMUSCULAR; INTRAVENOUS at 08:06

## 2023-06-17 RX ADMIN — PROCHLORPERAZINE EDISYLATE 5 MG: 5 INJECTION INTRAMUSCULAR; INTRAVENOUS at 12:06

## 2023-06-17 RX ADMIN — ONDANSETRON 4 MG: 2 INJECTION INTRAMUSCULAR; INTRAVENOUS at 06:06

## 2023-06-17 RX ADMIN — HYDROMORPHONE HYDROCHLORIDE 1 MG: 1 INJECTION, SOLUTION INTRAMUSCULAR; INTRAVENOUS; SUBCUTANEOUS at 09:06

## 2023-06-17 RX ADMIN — OXYCODONE HYDROCHLORIDE 15 MG: 5 TABLET ORAL at 11:06

## 2023-06-17 RX ADMIN — HYDROMORPHONE HYDROCHLORIDE 1 MG: 1 INJECTION, SOLUTION INTRAMUSCULAR; INTRAVENOUS; SUBCUTANEOUS at 10:06

## 2023-06-17 NOTE — PLAN OF CARE
Problem: Physical Therapy  Goal: Physical Therapy Goal  Description: Goals to be met by: discharge     Patient will increase functional independence with mobility by performin. Patient will modified independent with bed mobility.  2. Patient will be modified independent with transfers.  3. Patient will ambulate 50 ft with RW  and supervision.    2023 1442 by Anderson Morales PT  Outcome: Ongoing, Progressing   Patient seen for initial evaluation and treatment.

## 2023-06-17 NOTE — PLAN OF CARE
Problem: Adult Inpatient Plan of Care  Goal: Plan of Care Review  Outcome: Ongoing, Progressing  Goal: Patient-Specific Goal (Individualized)  Outcome: Ongoing, Progressing  Goal: Absence of Hospital-Acquired Illness or Injury  Outcome: Ongoing, Progressing  Goal: Optimal Comfort and Wellbeing  Outcome: Ongoing, Progressing  Goal: Readiness for Transition of Care  Outcome: Ongoing, Progressing     Problem: Fall Injury Risk  Goal: Absence of Fall and Fall-Related Injury  Outcome: Ongoing, Progressing     Problem: Infection  Goal: Absence of Infection Signs and Symptoms  Outcome: Ongoing, Progressing     Problem: Skin Injury Risk Increased  Goal: Skin Health and Integrity  Outcome: Ongoing, Progressing     Problem: Coping Ineffective  Goal: Effective Coping  Outcome: Ongoing, Progressing     Problem: Depression  Goal: Improved Mood  Outcome: Ongoing, Progressing     Problem: Adjustment to Surgery (Extremity Amputation)  Goal: Optimal Coping with Amputation  Outcome: Ongoing, Progressing     Problem: Bleeding (Extremity Amputation)  Goal: Absence of Bleeding  Outcome: Ongoing, Progressing     Problem: Bowel Motility Impaired (Extremity Amputation)  Goal: Effective Bowel Elimination  Outcome: Ongoing, Progressing     Problem: Fluid and Electrolyte Imbalance (Extremity Amputation)  Goal: Fluid and Electrolyte Balance  Outcome: Ongoing, Progressing     Problem: Functional Ability Impaired (Extremity Amputation)  Goal: Optimal Functional Ability  Outcome: Ongoing, Progressing     Problem: Infection (Extremity Amputation)  Goal: Absence of Infection Signs and Symptoms  Outcome: Ongoing, Progressing     Problem: Ongoing Anesthesia Effects (Extremity Amputation)  Goal: Anesthesia/Sedation Recovery  Outcome: Ongoing, Progressing     Problem: Pain (Extremity Amputation)  Goal: Acceptable Pain Control  Outcome: Ongoing, Progressing     Problem: Postoperative Nausea and Vomiting (Extremity Amputation)  Goal: Nausea and Vomiting  Relief  Outcome: Ongoing, Progressing     Problem: Postoperative Urinary Retention (Extremity Amputation)  Goal: Effective Urinary Elimination  Outcome: Ongoing, Progressing     Problem: Residual Limb Care (Extremity Amputation)  Goal: Optimal Residual Limb Healing  Outcome: Ongoing, Progressing

## 2023-06-17 NOTE — CARE UPDATE
06/17/23 0834   Patient Assessment/Suction   Level of Consciousness (AVPU) alert   Respiratory Effort Normal;Unlabored   Expansion/Accessory Muscles/Retractions no use of accessory muscles   All Lung Fields Breath Sounds clear   Rhythm/Pattern, Respiratory unlabored   PRE-TX-O2   Device (Oxygen Therapy) nasal cannula   $ Is the patient on Low Flow Oxygen? Yes   Flow (L/min) 2   Pulse Oximetry Type Continuous   $ Pulse Oximetry - Multiple Charge Pulse Oximetry - Multiple   Positioning HOB elevated 30 degrees   Incentive Spirometer   $ Incentive Spirometer Charges done independently per patient;proper technique demonstrated   Incentive Spirometer Predicted Level (mL) 2050   Administration (IS) instruction provided, follow-up;mouthpiece utilized;proper technique demonstrated   Number of Repetitions (IS) 10   Level Incentive Spirometer (mL) 1500  (MAX 1500-- 1000)   Patient Tolerance (IS) no adverse signs/symptoms present;good   Education   $ Education 15 min  (IS/DEEP BREATHING)

## 2023-06-17 NOTE — PLAN OF CARE
Problem: Physical Therapy  Goal: Physical Therapy Goal  Description: Goals to be met by: discharge     Patient will increase functional independence with mobility by performin. Patient will modified independent with bed mobility.  2. Patient will be modified independent with transfers.  3. Patient will ambulate 50 ft with RW  and supervision.    2023 1450 by Anderson Morales PT  Outcome: Ongoing, Progressing

## 2023-06-17 NOTE — PLAN OF CARE
Problem: Physical Therapy  Goal: Physical Therapy Goal  Description: Goals to be met by: discharge     Patient will increase functional independence with mobility by performin. Gait  x > 250 feet with Seaview using No Assistive Device  .     Outcome: Ongoing, Progressing   Patient seen for initial evaluation and treatment.

## 2023-06-17 NOTE — PROGRESS NOTES
General Surgery Progress Note    Admit Date: 6/12/2023  S/P: Procedure(s) (LRB):  AMPUTATION, ABOVE KNEE (Right)    Post-operative Day: 1 Day Post-Op    Hospital Day: 6    SUBJECTIVE:   Underwent right above-knee amputation yesterday. Immediate postoperatively pain control was an issue but now much improved. Patient having occasional sensation of phantom leg pain as well. On visit today patient was tolerating diet and in good spirits. She did report some burning sensation in her left lower extremity however pulses were dopplerable.    OBJECTIVE:     Vital Signs (Most Recent)  Temp:  [98.6 °F (37 °C)-98.9 °F (37.2 °C)] 98.6 °F (37 °C)  Pulse:  [71-98] 84  Resp:  [8-39] 22  SpO2:  [78 %-100 %] 90 %  BP: ()/(48-82) 97/56    I&Os:  I/O last 3 completed shifts:  In: 2486.8 [P.O.:240; I.V.:510; Blood:686.8; IV Piggyback:1050]  Out: 1750 [Urine:1650; Blood:100]    Physical Exam:  Gen: NAD, AAOx3  HEENT: Anicteric sclera  Pulm: unlabored, symmetrical   Abd: Soft  RLE: Status post above-knee amputation, Prevena device in place with good seal, previous medial thigh incision appears intact with stable appearing superficial ischemia of the inferior aspect of the skin  LLE: Foot is warm with dopplerable PT and DP pulses    Laboratory:  CBC:   Recent Labs   Lab 06/17/23  0432   WBC 5.26   RBC 2.66*   HGB 7.6*   HCT 24.1*      MCV 91   MCH 28.6   MCHC 31.5*     CMP:   Recent Labs   Lab 06/13/23  0125 06/14/23  0402 06/17/23  0432   *   < > 144*   CALCIUM 8.8   < > 7.9*   ALBUMIN 3.8  --   --    PROT 6.6  --   --       < > 138   K 4.2   < > 3.7   CO2 27   < > 28      < > 102   BUN 16   < > 11   CREATININE 1.0   < > 0.7   ALKPHOS 69  --   --    ALT 13  --   --    AST 24  --   --    BILITOT 1.1*  --   --     < > = values in this interval not displayed.     Labs within the past 24 hours have been reviewed.    ASSESSMENT/PLAN:     Patient Active Problem List    Diagnosis Date Noted    Critical limb  ischemia of right lower extremity 06/12/2023    Colostomy status 02/23/2023    Essential (primary) hypertension 01/09/2023    Radiation colitis 12/23/2022    Malignant neoplasm of cervix 11/10/2020         57 y.o. female with acute limb ischemia status post right above-knee amputation  -pain much improved  -plan to keep Prevena device in place for 3-5 days  -continue to monitor medial thigh incision  -continue to monitor left lower extremity signals  -continue Lyrica b.i.d.  -diet as tolerated  -okay to discharge out of ICU from general surgery standpoint  -will contact [email or web address] early next week

## 2023-06-17 NOTE — PLAN OF CARE
06/16/23 2017   Patient Assessment/Suction   Level of Consciousness (AVPU) alert   Respiratory Effort Unlabored   PRE-TX-O2   Device (Oxygen Therapy) room air   SpO2 99 %   Pulse Oximetry Type Continuous   $ Pulse Oximetry - Multiple Charge Pulse Oximetry - Multiple   Pulse 86   Resp 16   BP (!) 113/59

## 2023-06-17 NOTE — PLAN OF CARE
Problem: Physical Therapy  Goal: Physical Therapy Goal  Description: Goals to be met by: discharge     Patient will increase functional independence with mobility by performin. Patient will modified independent with bed mobility.  2. Patient will be modified independent with transfers.  3. Patient will ambulate 50 ft with RW  and supervision.    2023 1445 by Anderson Morales PT  Outcome: Ongoing, Progressing

## 2023-06-17 NOTE — ANESTHESIA POSTPROCEDURE EVALUATION
Anesthesia Post Evaluation    Patient: Carine Darden    Procedure(s) Performed: Procedure(s) (LRB):  AMPUTATION, ABOVE KNEE (Right)    Final Anesthesia Type: general      Patient location during evaluation: ICU  Patient participation: Yes- Able to Participate  Level of consciousness: awake and alert  Post-procedure vital signs: reviewed and stable  Pain management: adequate  Airway patency: patent    PONV status at discharge: No PONV  Anesthetic complications: no      Cardiovascular status: blood pressure returned to baseline and stable  Respiratory status: unassisted and room air  Hydration status: euvolemic  Follow-up not needed.          Vitals Value Taken Time   /56 06/16/23 1930   Temp 97 06/16/23 1947   Pulse 84 06/16/23 1945   Resp 16 06/16/23 1945   SpO2 100 % 06/16/23 1945   Vitals shown include unvalidated device data.      No case tracking events are documented in the log.      Pain/Jordan Score: Pain Rating Prior to Med Admin: 10 (6/16/2023  7:19 PM)  Pain Rating Post Med Admin: 7 (6/16/2023  6:56 PM)

## 2023-06-17 NOTE — PT/OT/SLP EVAL
Physical Therapy Evaluation    Patient Name:  Carine Darden   MRN:  [id number]    Recommendations:     Discharge Recommendations: rehabilitation facility   Discharge Equipment Recommendations:     Barriers to discharge: Decreased caregiver support    Assessment:     Carine Darden is a 57 y.o. female admitted with a medical diagnosis of Critical limb ischemia of right lower extremity.  She presents with the following impairments/functional limitations: gait instability, impaired balance, decreased lower extremity function, pain, impaired skin, impaired endurance ..    Rehab Prognosis: Good; patient would benefit from acute skilled PT services to address these deficits and reach maximum level of function.    Recent Surgery: Procedure(s) (LRB):  AMPUTATION, ABOVE KNEE (Right) 1 Day Post-Op    Plan:     During this hospitalization, patient to be seen daily to address the identified rehab impairments via gait training, therapeutic activities, therapeutic exercises and progress toward the following goals:    Plan of Care Expires:  07/17/23    Subjective     Chief Complaint: c/o low back discomforts.  Patient/Family Comments/goals: to be walking  Pain/Comfort:  Pain Rating 1: 5/10  Location - Orientation 1: lower  Location 1: back  Pain Addressed 1: Distraction, Cessation of Activity, Reposition  Pain Rating Post-Intervention 1: 3/10    Patients cultural, spiritual, Orthodox conflicts given the current situation: no    Living Environment:  No stairs/steps  Prior to admission, patients level of function was independent.  Equipment used at home: walker, standard.  DME owned (not currently used): standard walker.  Upon discharge, patient will have assistance from family..    Objective:     Communicated with nurse Yady Granda is off the floor prior to session.  Patient found sitting edge of bed with colostomy, wound vac, PureWick, peripheral IV, telemetry  upon PT entry to room.    General Precautions: Standard, fall  Orthopedic  Precautions:RLE non weight bearing   Braces: N/A  Respiratory Status: Room air    Exams:  Cognitive Exam:  Patient is oriented to Person, Place, Time, and Situation  Gross Motor Coordination:  WFL  Skin Integrity/Edema:      -       incision with wound vac to right residual lower limb.  RUE ROM: WFL  RUE Strength: WFL  LUE ROM: WFL  LUE Strength: WFL  RLE ROM: AK amputation  RLE Strength: AK amputation  LLE ROM: WFL  LLE Strength: WFL    Functional Mobility:  Bed Mobility:     Rolling Left:  modified independence  Rolling Right: moderate assistance  Supine to Sit: minimum assistance  Transfers:     Sit to Stand:  minimum assistance with rolling walker  Bed to Chair: minimum assistance with  rolling walker  using  Step Transfer  Gait: swing to/to gait on RW 18 ft min A  Balance: good sitting. Use of RW for standing.      AM-PAC 6 CLICK MOBILITY  Total Score:17     Treatment & Education:  Patient seen for initial evaluation and treatment.    Patient left up in chair with all lines intact and call button in reach.    GOALS:   Multidisciplinary Problems       Physical Therapy Goals          Problem: Physical Therapy    Goal Priority Disciplines Outcome Goal Variances Interventions   Physical Therapy Goal     PT, PT/OT Ongoing, Progressing     Description: Goals to be met by: discharge     Patient will increase functional independence with mobility by performin. Patient will modified independent with bed mobility.  2. Patient will be modified independent with transfers.  3. Patient will ambulate 50 ft with RW  and supervision.                         History:     History reviewed. No pertinent past medical history.    Past Surgical History:   Procedure Laterality Date    ANGIOGRAM, ABDOMINAL AORTA W/ EXTREMITY RUNOFF Right 2023    Procedure: Angiogram, Abdominal Aorta W/ Extremity Runoff;  Surgeon: Moody Aleman MD;  Location: [facility] CATH/EP LAB;  Service: Vascular;  Laterality: Right;    ANGIOGRAPHY OF  LOWER EXTREMITY N/A 6/13/2023    Procedure: Angiogram Extremity Unilateral;  Surgeon: Moody Aleman MD;  Location: [facility] CATH/EP LAB;  Service: General;  Laterality: N/A;    CREATION, BYPASS, ARTERIAL, FEMORAL TO POPLITEAL, USING IN-SITU VEIN GRAFT Right 6/13/2023    Procedure: CREATION, BYPASS, ARTERIAL, FEMORAL TO POPLITEAL, USING IN-SITU VEIN GRAFT;  Surgeon: Moody Aleman MD;  Location: [facility] OR;  Service: Vascular;  Laterality: Right;    EMBOLECTOMY OR THROMBECTOMY, BLOOD VESSEL, LOWER EXTREMITY Right 6/12/2023    Procedure: EMBOLECTOMY OR THROMBECTOMY, BLOOD VESSEL, LOWER EXTREMITY;  Surgeon: Moody Aleman MD;  Location: [facility] CATH/EP LAB;  Service: Vascular;  Laterality: Right;    EMBOLECTOMY, LOWER EXTREMITY Right 6/13/2023    Procedure: EMBOLECTOMY, LOWER EXTREMITY;  Surgeon: Moody Aleman MD;  Location: [facility] OR;  Service: Vascular;  Laterality: Right;    PTA, ILIAC ARTERY Right 6/12/2023    Procedure: PTA, Iliac Artery;  Surgeon: Moody Aleman MD;  Location: [facility] CATH/EP LAB;  Service: Vascular;  Laterality: Right;    STENT, ILIAC ARTERY Right 6/12/2023    Procedure: Stent, Iliac Artery;  Surgeon: Moody Aleman MD;  Location: [facility] CATH/EP LAB;  Service: Vascular;  Laterality: Right;    THROMBOLYSIS OR THROMBECTOMY, BLOOD VESSEL, LOWER EXTREMITY Right 6/12/2023    Procedure: THROMBOLYSIS OR THROMBECTOMY, BLOOD VESSEL, LOWER EXTREMITY;  Surgeon: Moody Aleman MD;  Location: [facility] CATH/EP LAB;  Service: Vascular;  Laterality: Right;       Time Tracking:     PT Received On: 06/17/23  PT Start Time: 1141     PT Stop Time: 1211  PT Total Time (min): 30 min     Billable Minutes: Evaluation 15 and Gait Training 15      06/17/2023

## 2023-06-17 NOTE — PROGRESS NOTES
"Critical access hospital Medicine Progress Note  Patient Name: Carine Darden MRN: [id number]   Patient Class: IP- Inpatient  Length of Stay: 5   Admission Date: 6/12/2023 11:06 AM Attending Physician: Jasmeet Balbuena MD   Primary Care Provider: Inocente Guillermo NP Face-to-Face encounter date: 06/17/2023   Chief Complaint: Leg Pain (Pt R foot is cool to the touch. 10/10 pain stated by pt. Started 9pm last night. Pt states "numbness and tingling" and hip pain. )      Subjective:    Interval History     Pain okay controlled.  Had some numbness and tingling to left foot overnight.  Pulses are dopplerable. Foot is normal temp this AM (not cold but not very warm) with normal color. SBP can be soft at times in the 80s-90s but making good urine (though orange in color) and  at the time of my exam. She appears much more comfortable though does report the IV pain medication wears off quickly. We discussed alternating the IV and po medication though this is currently what her RN has been doing. Therapy consulted.       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  Vascular surgery consulted.  Heparin gtt started. 6/12 right iliac percutaneous  thrombectomy and stent to right common iliac with cath placed for tpa infusion.  She was brought back to the OR with Dr. Aleman and intra operative findings show persistent right popliteal thrombosis with diffuse thrombus in the posterior tibial and peroneal arteries.   Exploration of these vessels revealed changes to suggest these may be chronic. Revascularization was not attempted given concern that it would disrupt more collaterals. Discussed with Dr. Aleman who recommends amputation. General surgery consulted and s/p right AKA 6/16. Has required blood transfusion from H/H trending down likely " "from oozing from lower extremity angiogram incision sites.     Review of Systems   All other Review of Systems were found to be negative expect for that mentioned already in HPI.     Hospital Course     06/17/2023     chlorhexidine  15 mL Mouth/Throat BID    docusate sodium  100 mg Oral BID    mupirocin   Nasal BID    pantoprazole  40 mg Oral Daily    pregabalin  150 mg Oral BID       sodium chloride, acetaminophen, acetaminophen, aluminum-magnesium hydroxide-simethicone, dextrose 50%, dextrose 50%, fentaNYL, glucagon (human recombinant), glucose, glucose, HYDROmorphone, HYDROmorphone, magnesium sulfate IVPB, magnesium sulfate IVPB, melatonin, naloxone, ondansetron, oxyCODONE, potassium bicarbonate, potassium bicarbonate, potassium bicarbonate, prochlorperazine, simethicone, sodium chloride 0.9%      Objective:   Physical Exam  BP (!) 97/56   Pulse 84   Temp 98.6 °F (37 °C) (Oral)   Resp (!) 22   Ht 5' 2" (1.575 m)   Wt 96.3 kg (212 lb 4.9 oz)   SpO2 (!) 90%   BMI 38.83 kg/m²   Constitutional: No distress.   HENT: Atraumatic.   Cardiovascular: Normal rate, regular rhythm and normal heart sounds.   Pulmonary/Chest: Effort normal. Clear to auscultation bilaterally. No wheezes.   Abdominal: Soft. Bowel sounds are normal. Exhibits no distension and no mass. No tenderness  Neurological: Alert.   Skin: Skin is warm and dry.   Right AKA    Labs and Imaging    Significant Labs: All pertinent labs within the past 24 hours have been reviewed.    Significant Imaging: I have reviewed all pertinent imaging results/findings within the past 24 hours.    I have reviewed the Vitals, labs and imaging as above.     Assessment & Plan:   Carine Darden is a 57 y.o. female admitted for    Active Hospital Problems    Diagnosis    *Critical limb ischemia of right lower extremity    Colostomy status    Essential (primary) hypertension    Radiation colitis    Malignant neoplasm of cervix       Plan  Vascular Surgical intervention not " successful. General surgery consulted and patient went right AKA 6/16.  Routine post op care.  Pain control.  Medication adjusted.  Appears much more comfortable. Plan to wean off IV pain medication in next 1-1.5 days.  S/p blood transfusion.  Had been oozing from angiogram leg incisions.  Monitoring.  Watching low grade temperature.  No overt infection detected.  AM labs ordered.  Moving out of ICU  Continuing vascular monitoring       Active PT: No  Active OT: No  Active SLP: No    Core measures:  - Code status:   - Diet: Cardiac  VTE Risk Mitigation (From admission, onward)           Ordered     IP VTE HIGH RISK PATIENT  Once         06/13/23 0705     Place sequential compression device  Until discontinued         06/13/23 0705     Reason for No Pharmacological VTE Prophylaxis  Once        Question:  Reasons:  Answer:  IV Heparin w/in 24 hrs. Pre or Post-Op    06/13/23 0705                    Discharge Planning:   Discharge Planning   SHONDA: 06/16    Code Status: Full Code   Is the patient medically ready for discharge?: no    Reason for patient still in hospital (select all that apply): Patient trending condition  Discharge Plan A: Home with assistance from family        Above encounter included review of the medical records, interviewing and examining the patient face-to-face, discussion with family and other health care providers, ordering and interpreting lab/test results and formulating a plan of care.     Medical Decision Making:  [] Low Complexity  [] Moderate Complexity  [x] High Complexity    Og Foley MD  [facility] Hospitalist  06/17/2023

## 2023-06-17 NOTE — PLAN OF CARE
Problem: Occupational Therapy  Goal: Occupational Therapy Goal  Description: Goals to be met by: 7/10/2023     Patient will increase functional independence with ADLs by performing:    LE Dressing with Modified [place].  Toileting from bedside commode with Modified [place] for hygiene and clothing management.   Toilet transfer to bedside commode with Modified [place].    Outcome: Ongoing, Progressing

## 2023-06-17 NOTE — PROGRESS NOTES
"[facility] Medicine Progress Note  Patient Name: Carine Darden MRN: [id number]   Patient Class: IP- Inpatient  Length of Stay: 4   Admission Date: 6/12/2023 11:06 AM Attending Physician: Jasmeet Balbuena MD   Primary Care Provider: Inocente Guillermo NP Face-to-Face encounter date: 06/16/2023   Chief Complaint: Leg Pain (Pt R foot is cool to the touch. 10/10 pain stated by pt. Started 9pm last night. Pt states "numbness and tingling" and hip pain. )      Subjective:    Interval History     S/p right AKA at the time of this note writing. She is groggy post procedure. Further blood transfusion ordered pre op as Hg only improved from 7 to 7.2 with yesterday's transfusion.     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  Vascular surgery consulted.  Heparin gtt started. 6/12 right iliac percutaneous  thrombectomy and stent to right common iliac with cath placed for tpa infusion.  She was brought back to the OR with Dr. Aleman and intra operative findings show persistent right popliteal thrombosis with diffuse thrombus in the posterior tibial and peroneal arteries.   Exploration of these vessels revealed changes to suggest these may be chronic. Revascularization was not attempted given concern that it would disrupt more collaterals. Discussed with Dr. Aleman who recommends amputation.     Review of Systems   All other Review of Systems were found to be negative expect for that mentioned already in HPI.     Hospital Course     06/16/2023     chlorhexidine  15 mL Mouth/Throat BID    docusate sodium  100 mg Oral BID    mupirocin   Nasal BID    pantoprazole  40 mg Oral Daily    pregabalin  150 mg Oral BID       sodium chloride, acetaminophen, acetaminophen, aluminum-magnesium hydroxide-simethicone, dextrose 50%, dextrose 50%, " "fentaNYL, glucagon (human recombinant), glucose, glucose, HYDROmorphone, HYDROmorphone, magnesium sulfate IVPB, magnesium sulfate IVPB, melatonin, naloxone, ondansetron, oxyCODONE, potassium bicarbonate, potassium bicarbonate, potassium bicarbonate, prochlorperazine, simethicone, sodium chloride 0.9%      Objective:   Physical Exam  BP (!) 113/59   Pulse 86   Temp 98.6 °F (37 °C) (Oral)   Resp 16   Ht 5' 2" (1.575 m)   Wt 96.3 kg (212 lb 4.9 oz)   SpO2 99%   BMI 38.83 kg/m²   Constitutional: No distress.   HENT: Atraumatic.   Cardiovascular: Normal rate, regular rhythm and normal heart sounds.   Pulmonary/Chest: Effort normal. Clear to auscultation bilaterally. No wheezes.   Abdominal: Soft. Bowel sounds are normal. Exhibits no distension and no mass. No tenderness  Neurological: Alert.   Skin: Skin is warm and dry.   Right AKA    Labs and Imaging    Significant Labs: All pertinent labs within the past 24 hours have been reviewed.    Significant Imaging: I have reviewed all pertinent imaging results/findings within the past 24 hours.    I have reviewed the Vitals, labs and imaging as above.     Assessment & Plan:   Carine Darden is a 57 y.o. female admitted for    Active Hospital Problems    Diagnosis    *Critical limb ischemia of right lower extremity    Colostomy status    Essential (primary) hypertension    Radiation colitis    Malignant neoplasm of cervix       Plan  Vascular Surgical intervention not successful. General surgery consulted and patient went right AKA 6/16.  Routine post op care.  Pain control.  Medication adjusted.  Tranfusing blood again today  Watching low grade temperature.  No overt infection.  AM labs ordered.  Continuing continuous hemodynamic monitoring in the ICU      Active PT: No  Active OT: No  Active SLP: No    Core measures:  - Code status:   - Diet: Cardiac  VTE Risk Mitigation (From admission, onward)           Ordered     IP VTE HIGH RISK PATIENT  Once         06/13/23 0705    "  Place sequential compression device  Until discontinued         06/13/23 0705     Reason for No Pharmacological VTE Prophylaxis  Once        Question:  Reasons:  Answer:  IV Heparin w/in 24 hrs. Pre or Post-Op    06/13/23 0705                    Discharge Planning:   Discharge Planning   SHONDA: 06/16    Code Status: Full Code   Is the patient medically ready for discharge?: no    Reason for patient still in hospital (select all that apply): Patient trending condition  Discharge Plan A: Home with assistance from family        Above encounter included review of the medical records, interviewing and examining the patient face-to-face, discussion with family and other health care providers, ordering and interpreting lab/test results and formulating a plan of care.     Medical Decision Making:  [] Low Complexity  [] Moderate Complexity  [x] High Complexity    Og Foley MD  [facility] Hospitalist  06/16/2023

## 2023-06-17 NOTE — PT/OT/SLP EVAL
Occupational Therapy   Evaluation    Name: Carine Darden  MRN: [id number]  Admitting Diagnosis: Critical limb ischemia of right lower extremity  Recent Surgery: Procedure(s) (LRB):  AMPUTATION, ABOVE KNEE (Right) 1 Day Post-Op    Recommendations:     Discharge Recommendations: rehabilitation facility  Discharge Equipment Recommendations:  bedside commode  Barriers to discharge:  None    Assessment:     Carine Darden is a 57 y.o. female with a medical diagnosis of Critical limb ischemia of right lower extremity.  She presents with Performance deficits affecting function: weakness, impaired endurance, impaired self care skills, impaired functional mobility, gait instability, impaired balance, decreased lower extremity function, decreased coordination, pain, decreased safety awareness, impaired skin, edema, orthopedic precautions.    Pt found sitting EOB about to eat dinner with her nurses in room. Pt agreeable to OT eval. Pt reported R residual limb pain 7/10, Nurse in offered pt her pain medication and she agreed to taking it before starting OT. She has good UB strength and good sitting balance seated EOB.  She performed LE dressing Min A to don L shoe while seated EOB, sit<>stand CGA to RW, Pt ambulated (hopped) 3 feet forward and backwards with Min A for stability. Pt expressed wanting to return home with HH services once discharged from hospital. She would benefit fr from IPR before discharge to home. She is amendable to going to IPR. DME needs if pt goes home: [facility]. Pt. has to get cleared by surgeon to shower at home so no DME for tub is recommended at this time. It is recommended that pt sponge bathe at home. Continue with OT POC.     Rehab Prognosis: Good; patient would benefit from acute skilled OT services to address these deficits and reach maximum level of function.       Plan:     Patient to be seen 5 x/week to address the above listed problems via self-care/home management, therapeutic activities, therapeutic  exercises  Plan of Care Expires: 07/17/23  Plan of Care Reviewed with: patient    Subjective     Chief Complaint: R residual limb pain 7/10  Patient/Family Comments/goals: I want to get HH therapy because its easier than someone having to take me to therapy in the car.    Occupational Profile:  Living Environment: pt lives with her spouse in [facility] with no steps; t/s combo. No DME.  Previous level of function: Indep-Mod I with RW; does IADLS.  Roles and Routines: active  Equipment Used at Home: walker, rolling  Assistance upon Discharge: spouse    Pain/Comfort:  Pain Rating 1: 7/10  Location - Side 1: Right  Location 1: leg (residual limb R AKA)    Patients cultural, spiritual, Jehovah's witness conflicts given the current situation: no    Objective:     Communicated with: nurse prior to session.  Patient found sitting edge of bed with telemetry, oxygen, bed alarm, wound vac, pulse ox (continuous), blood pressure cuff, colostomy upon OT entry to room.    General Precautions: Standard, fall  Orthopedic Precautions: RLE non weight bearing (s/p R AKA)  Braces: N/A  Respiratory Status: Nasal cannula, flow 2.5 L/min    Occupational Performance:      Functional Mobility/Transfers:  Patient completed Sit <> Stand Transfer with contact guard assistance  with  rolling walker   Functional Mobility: ambulated /hopped 3' with Min A with RW forwards and backwards near bed.    Activities of Daily Living:  Feeding:  independence .  Grooming: independence  seated.  Lower Body Dressing: minimum assistance donned L slip on shoe seated EOB  Toileting: has PW, total A.      Cognitive/Visual Perceptual:  Cognitive/Psychosocial Skills:     -       Oriented to: Person, Place, Time, and Situation   -       Follows Commands/attention:Follows multistep  commands  -       Communication: clear/fluent  -       Memory: No Deficits noted  -       Safety awareness/insight to disability: impaired   -       Mood/Affect/Coping skills/emotional control:  Appropriate to situation  Visual/Perceptual:      -Intact .    Physical Exam:  Balance:    -       static sitting; good  dynamic: fair plus   standing: good with RW  dynamic: poor 2/2 R AKA  Postural examination/scapula alignment:    -       No postural abnormalities identified  Skin integrity: R AKA with wound vac  Edema:  Moderate R residual limb  Dominant hand:    -       right  Upper Extremity Range of Motion:     -       Right Upper Extremity: WFL  -       Left Upper Extremity: WFL  Upper Extremity Strength:    -       Right Upper Extremity: WFL  -       Left Upper Extremity: WFL   Strength:    -       Right Upper Extremity: WFL  -       Left Upper Extremity: WFL    AMPAC 6 Click ADL:  AMPAC Total Score: 21    Treatment & Education:  Purpose of oT and POC  Explained differences in IPR and HH  Dc planning  Safety education/fall prevention/call staff for OOB assist    Patient left sitting edge of bed with all lines intact, call button in reach, bed alarm on, and nurse present    GOALS:   Multidisciplinary Problems       Occupational Therapy Goals          Problem: Occupational Therapy    Goal Priority Disciplines Outcome Interventions   Occupational Therapy Goal     OT, PT/OT Ongoing, Progressing    Description: Goals to be met by: 7/10/2023     Patient will increase functional independence with ADLs by performing:    LE Dressing with Modified [place].  Toileting from bedside commode with Modified [place] for hygiene and clothing management.   Toilet transfer to bedside commode with Modified [place].                         History:     History reviewed. No pertinent past medical history.      Past Surgical History:   Procedure Laterality Date    ANGIOGRAM, ABDOMINAL AORTA W/ EXTREMITY RUNOFF Right 6/12/2023    Procedure: Angiogram, Abdominal Aorta W/ Extremity Runoff;  Surgeon: Moody Aleman MD;  Location: [facility] CATH/EP LAB;  Service: Vascular;  Laterality: Right;    ANGIOGRAPHY OF LOWER  EXTREMITY N/A 6/13/2023    Procedure: Angiogram Extremity Unilateral;  Surgeon: Moody Aleman MD;  Location: [facility] CATH/EP LAB;  Service: General;  Laterality: N/A;    CREATION, BYPASS, ARTERIAL, FEMORAL TO POPLITEAL, USING IN-SITU VEIN GRAFT Right 6/13/2023    Procedure: CREATION, BYPASS, ARTERIAL, FEMORAL TO POPLITEAL, USING IN-SITU VEIN GRAFT;  Surgeon: Moody Aleman MD;  Location: [facility] OR;  Service: Vascular;  Laterality: Right;    EMBOLECTOMY OR THROMBECTOMY, BLOOD VESSEL, LOWER EXTREMITY Right 6/12/2023    Procedure: EMBOLECTOMY OR THROMBECTOMY, BLOOD VESSEL, LOWER EXTREMITY;  Surgeon: Moody Aleman MD;  Location: [facility] CATH/EP LAB;  Service: Vascular;  Laterality: Right;    EMBOLECTOMY, LOWER EXTREMITY Right 6/13/2023    Procedure: EMBOLECTOMY, LOWER EXTREMITY;  Surgeon: Moody Aleman MD;  Location: [facility] OR;  Service: Vascular;  Laterality: Right;    PTA, ILIAC ARTERY Right 6/12/2023    Procedure: PTA, Iliac Artery;  Surgeon: Moody Aleman MD;  Location: [facility] CATH/EP LAB;  Service: Vascular;  Laterality: Right;    STENT, ILIAC ARTERY Right 6/12/2023    Procedure: Stent, Iliac Artery;  Surgeon: Moody Aleman MD;  Location: [facility] CATH/EP LAB;  Service: Vascular;  Laterality: Right;    THROMBOLYSIS OR THROMBECTOMY, BLOOD VESSEL, LOWER EXTREMITY Right 6/12/2023    Procedure: THROMBOLYSIS OR THROMBECTOMY, BLOOD VESSEL, LOWER EXTREMITY;  Surgeon: Moody Aleman MD;  Location: [facility] CATH/EP LAB;  Service: Vascular;  Laterality: Right;       Time Tracking:     OT Date of Treatment: 06/17/23  OT Start Time: 1653  OT Stop Time: 1705  OT Total Time (min): 12 min    Billable Minutes:Evaluation 12  Total Time 12 6/17/2023

## 2023-06-18 LAB
ANION GAP SERPL CALC-SCNC: 6 MMOL/L (ref 8–16)
BASOPHILS # BLD AUTO: 0.01 K/UL (ref 0–0.2)
BASOPHILS NFR BLD: 0.2 % (ref 0–1.9)
BLD PROD TYP BPU: NORMAL
BLOOD UNIT EXPIRATION DATE: NORMAL
BLOOD UNIT TYPE CODE: 5100
BLOOD UNIT TYPE: NORMAL
BUN SERPL-MCNC: 8 MG/DL (ref 6–20)
CALCIUM SERPL-MCNC: 8.1 MG/DL (ref 8.7–10.5)
CHLORIDE SERPL-SCNC: 103 MMOL/L (ref 95–110)
CO2 SERPL-SCNC: 30 MMOL/L (ref 23–29)
CODING SYSTEM: NORMAL
CREAT SERPL-MCNC: 0.7 MG/DL (ref 0.5–1.4)
CROSSMATCH INTERPRETATION: NORMAL
DIFFERENTIAL METHOD: ABNORMAL
DISPENSE STATUS: NORMAL
EOSINOPHIL # BLD AUTO: 0.2 K/UL (ref 0–0.5)
EOSINOPHIL NFR BLD: 2.8 % (ref 0–8)
ERYTHROCYTE [DISTWIDTH] IN BLOOD BY AUTOMATED COUNT: 14.3 % (ref 11.5–14.5)
EST. GFR  (NO RACE VARIABLE): >60 ML/MIN/1.73 M^2
GLUCOSE SERPL-MCNC: 170 MG/DL (ref 70–110)
HCT VFR BLD AUTO: 25 % (ref 37–48.5)
HGB BLD-MCNC: 7.6 G/DL (ref 12–16)
IMM GRANULOCYTES # BLD AUTO: 0.03 K/UL (ref 0–0.04)
IMM GRANULOCYTES NFR BLD AUTO: 0.6 % (ref 0–0.5)
LYMPHOCYTES # BLD AUTO: 1.1 K/UL (ref 1–4.8)
LYMPHOCYTES NFR BLD: 19.6 % (ref 18–48)
MAGNESIUM SERPL-MCNC: 1.8 MG/DL (ref 1.6–2.6)
MCH RBC QN AUTO: 28.7 PG (ref 27–31)
MCHC RBC AUTO-ENTMCNC: 30.4 G/DL (ref 32–36)
MCV RBC AUTO: 94 FL (ref 82–98)
MONOCYTES # BLD AUTO: 0.6 K/UL (ref 0.3–1)
MONOCYTES NFR BLD: 10.5 % (ref 4–15)
NEUTROPHILS # BLD AUTO: 3.6 K/UL (ref 1.8–7.7)
NEUTROPHILS NFR BLD: 66.3 % (ref 38–73)
NRBC BLD-RTO: 0 /100 WBC
NUM UNITS TRANS PACKED RBC: NORMAL
PLATELET # BLD AUTO: 195 K/UL (ref 150–450)
PMV BLD AUTO: 10.1 FL (ref 9.2–12.9)
POTASSIUM SERPL-SCNC: 3.7 MMOL/L (ref 3.5–5.1)
RBC # BLD AUTO: 2.65 M/UL (ref 4–5.4)
SODIUM SERPL-SCNC: 139 MMOL/L (ref 136–145)
WBC # BLD AUTO: 5.42 K/UL (ref 3.9–12.7)

## 2023-06-18 PROCEDURE — 99900031 HC PATIENT EDUCATION (STAT)

## 2023-06-18 PROCEDURE — 99900035 HC TECH TIME PER 15 MIN (STAT)

## 2023-06-18 PROCEDURE — 80048 BASIC METABOLIC PNL TOTAL CA: CPT | Performed by: SURGERY

## 2023-06-18 PROCEDURE — 83735 ASSAY OF MAGNESIUM: CPT | Performed by: SURGERY

## 2023-06-18 PROCEDURE — 36415 COLL VENOUS BLD VENIPUNCTURE: CPT | Performed by: SURGERY

## 2023-06-18 PROCEDURE — 85025 COMPLETE CBC W/AUTO DIFF WBC: CPT | Performed by: SURGERY

## 2023-06-18 PROCEDURE — 63600175 PHARM REV CODE 636 W HCPCS: Performed by: INTERNAL MEDICINE

## 2023-06-18 PROCEDURE — 25000003 PHARM REV CODE 250: Performed by: INTERNAL MEDICINE

## 2023-06-18 PROCEDURE — 25000003 PHARM REV CODE 250: Performed by: SURGERY

## 2023-06-18 PROCEDURE — 12000002 HC ACUTE/MED SURGE SEMI-PRIVATE ROOM

## 2023-06-18 PROCEDURE — 27000221 HC OXYGEN, UP TO 24 HOURS

## 2023-06-18 PROCEDURE — 97116 GAIT TRAINING THERAPY: CPT

## 2023-06-18 PROCEDURE — 94761 N-INVAS EAR/PLS OXIMETRY MLT: CPT

## 2023-06-18 RX ADMIN — ACETAMINOPHEN 650 MG: 325 TABLET ORAL at 03:06

## 2023-06-18 RX ADMIN — DOCUSATE SODIUM 100 MG: 100 CAPSULE, LIQUID FILLED ORAL at 08:06

## 2023-06-18 RX ADMIN — HYDROMORPHONE HYDROCHLORIDE 1 MG: 1 INJECTION, SOLUTION INTRAMUSCULAR; INTRAVENOUS; SUBCUTANEOUS at 06:06

## 2023-06-18 RX ADMIN — OXYCODONE HYDROCHLORIDE 15 MG: 5 TABLET ORAL at 08:06

## 2023-06-18 RX ADMIN — PREGABALIN 150 MG: 75 CAPSULE ORAL at 08:06

## 2023-06-18 RX ADMIN — OXYCODONE HYDROCHLORIDE 15 MG: 5 TABLET ORAL at 03:06

## 2023-06-18 RX ADMIN — HYDROMORPHONE HYDROCHLORIDE 1 MG: 1 INJECTION, SOLUTION INTRAMUSCULAR; INTRAVENOUS; SUBCUTANEOUS at 02:06

## 2023-06-18 RX ADMIN — Medication 9 MG: at 08:06

## 2023-06-18 RX ADMIN — PANTOPRAZOLE SODIUM 40 MG: 40 TABLET, DELAYED RELEASE ORAL at 06:06

## 2023-06-18 NOTE — PLAN OF CARE
Problem: Physical Therapy  Goal: Physical Therapy Goal  Description: Goals to be met by: discharge     Patient will increase functional independence with mobility by performin. Patient will modified independent with bed mobility.  2. Patient will be modified independent with transfers.  3. Patient will ambulate 50 ft with RW  and supervision.    Outcome: Ongoing, Progressing   Patient seen for bed mobility, transfers and ambulation to facilitate improved mobility

## 2023-06-18 NOTE — NURSING
Received as a transfer from MICU.  Awake, alert. Wound vac intact to right stump area.  Denies  SOB at this time..Received report from Nurse Vasquez from ICU.

## 2023-06-18 NOTE — RESPIRATORY THERAPY
06/17/23 1930   Patient Assessment/Suction   Level of Consciousness (AVPU) alert   Respiratory Effort Unlabored   PRE-TX-O2   Device (Oxygen Therapy) room air   SpO2 (!) 90 %   Pulse Oximetry Type Continuous   $ Pulse Oximetry - Multiple Charge Pulse Oximetry - Multiple   Pulse 92   Resp (!) 24   BP (!) 111/52   Education   $ Education 15 min   Respiratory Evaluation   $ Care Plan Tech Time 15 min   $ Eval/Re-eval Charges Evaluation

## 2023-06-18 NOTE — PLAN OF CARE
Doing ok today. Sat on side of bed and in chair off and on throughout the day, can transfer pretty well with walker on one leg, good balance. The surgery site right residual limb is intact, no bleeding noted, wound vac provena remains compressed. Still with some general edema and bruising to the site.    Was warm again today 100.3 oral, encouraged incentive spirometery although she does not seem consistent with this and needs much reminding. And encourage pulmonary hygiene. Still with occasional cough with the patient attributes to smoking. Still requires o2 while sleeping, noted to be between 78-84% on room air while sleeping.    The bed is low and alarm on. Clinical alarms reviewed and armed. Monitor strip printed and reviewed, sinus rhythm / low sinus tach. The urine seems concentrated and I have asked her to drink fluids. Elected to change bilat ac ivs as they were  and so I attempted right cephalic with ultrasound but it blew and so I held pressure for several minutes and applied coban, site asymptomatic. Then got left brachial x1 attempt with ultrasound using extended length 20gauge, aseptic technique. This worked for about 5 or 6 hours until the iv pump was alarming and she was noted to begin to have some infiltration there (normal saline infusing 100ml/hr). Removed iv and messaged MD. I didn't see much else and so was going to try to obtain order for midline, but I was able to get a 22 gauge to right wrist. She has medsurg orders and nothing else currently ordered except normal saline and so had elected to make do with that for now. Between the multiple getting up and down, positional ivs, and other IV troubles she only got about 5-600ml saline throughout shift.    She has a good appetite and had most of her meals. She changed her ostomy appliance today by herself.    Medicating with oxycodone +apap, occasional dilaudid (only once today). With some intermittent nausea, gave zofran.    SCD is in place  / on (left leg). I zeroed the bed while she was in the chair. Family is at bedside, educated on plan of care and updated.

## 2023-06-18 NOTE — PROGRESS NOTES
General Surgery Progress Note    Admit Date: 6/12/2023  S/P: Procedure(s) (LRB):  AMPUTATION, ABOVE KNEE (Right)    Post-operative Day: 2 Days Post-Op    Hospital Day: 7    SUBJECTIVE:   Transferred to the floor yesterday. Pain continues to be well controlled. No significant phantom pain at this time. Has mild burning sensation in the left lower extremity however Doppler signals are easily obtained. Tolerating diet.  Seems to be in good spirits.    OBJECTIVE:     Vital Signs (Most Recent)  Temp:  [97.4 °F (36.3 °C)-100.3 °F (37.9 °C)] 98.7 °F (37.1 °C)  Pulse:  [] 92  Resp:  [10-39] 20  SpO2:  [87 %-100 %] 91 %  BP: ()/(52-79) 120/57    I&Os:  I/O last 3 completed shifts:  In: 1300 [P.O.:900; I.V.:400]  Out: 330 [Urine:330]    Physical Exam:  Gen: NAD, AAOx3  HEENT: Anicteric sclera  Pulm: unlabored, symmetrical   Abd: Soft  RLE: Status post above-knee amputation, Prevena device in place with good seal, previous medial thigh incision appears intact with stable appearing superficial ischemia of the inferior aspect of the skin  LLE: Foot is warm with dopplerable PT and DP pulses    Laboratory:  CBC:   Recent Labs   Lab 06/18/23  0431   WBC 5.42   RBC 2.65*   HGB 7.6*   HCT 25.0*      MCV 94   MCH 28.7   MCHC 30.4*       CMP:   Recent Labs   Lab 06/13/23  0125 06/14/23  0402 06/18/23  0431   *   < > 170*   CALCIUM 8.8   < > 8.1*   ALBUMIN 3.8  --   --    PROT 6.6  --   --       < > 139   K 4.2   < > 3.7   CO2 27   < > 30*      < > 103   BUN 16   < > 8   CREATININE 1.0   < > 0.7   ALKPHOS 69  --   --    ALT 13  --   --    AST 24  --   --    BILITOT 1.1*  --   --     < > = values in this interval not displayed.       Labs within the past 24 hours have been reviewed.    ASSESSMENT/PLAN:     Patient Active Problem List    Diagnosis Date Noted    Critical limb ischemia of right lower extremity 06/12/2023    Colostomy status 02/23/2023    Essential (primary) hypertension 01/09/2023     Radiation colitis 12/23/2022    Malignant neoplasm of cervix 11/10/2020         57 y.o. female with acute limb ischemia status post right above-knee amputation  -will plan to remove Prevena on Tuesday to evaluate the wound  -continue physical therapy  -plan to contact [facility] on Monday  -continue Lyrica b.i.d.  -continue current narcotic regimen

## 2023-06-18 NOTE — NURSING
Nurses Note -- 4 Eyes      6/18/2023   12:53 PM      Skin assessed during: Transfer      [] No Altered Skin Integrity Present    []Prevention Measures Documented      [x] Yes- Altered Skin Integrity Present or Discovered   [] LDA Added if Not in Epic (Describe Wound)   [x] New Altered Skin Integrity was Present on Admit and Documented in LDA   [] Wound Image Taken  Tape burns, healing noted to inner left upper arm. Bruising noted to Right and left arms from multiple sticks for lab testing and IV sites. Silver dressing noted to inner right stump area wiith wound vac in place to stump area. The stump area is covered with large tegaderm Tegaderm intact to sacxral area to protect skin in this area.  Wound Care Consulted? No    Attending Nurse:  Evette Schaffer RN     Second RN/Staff Member:  Jared Romero LPN.

## 2023-06-18 NOTE — CARE UPDATE
06/18/23 0733   PRE-TX-O2   Device (Oxygen Therapy) nasal cannula   $ Is the patient on Low Flow Oxygen? Yes   Pulse Oximetry Type Continuous   $ Pulse Oximetry - Multiple Charge Pulse Oximetry - Multiple   Resp 16

## 2023-06-18 NOTE — PT/OT/SLP PROGRESS
"Physical Therapy Treatment    Patient Name:  Carine Darden   MRN:  [id number]    Recommendations:     Discharge Recommendations: rehabilitation facility  Discharge Equipment Recommendations: wheelchair  Barriers to discharge: Decreased caregiver support    Assessment:     Carine Darden is a 57 y.o. female admitted with a medical diagnosis of Critical limb ischemia of right lower extremity.  She presents with the following impairments/functional limitations: gait instability, pain, decreased lower extremity function, impaired functional mobility ..    Rehab Prognosis: Good; patient would benefit from acute skilled PT services to address these deficits and reach maximum level of function.    Recent Surgery: Procedure(s) (LRB):  AMPUTATION, ABOVE KNEE (Right) 2 Days Post-Op    Plan:     During this hospitalization, patient to be seen daily to address the identified rehab impairments via gait training, therapeutic activities, therapeutic exercises and progress toward the following goals:    Plan of Care Expires:  07/17/23    Subjective     Chief Complaint: incision pain  Patient/Family Comments/goals: "It hurts more today and I took a pain pill already."  Pain/Comfort:  Pain Rating 1: 8/10  Location - Side 1: Right  Location - Orientation 1: proximal  Location 1: thigh  Pain Addressed 1: Distraction, Pre-medicate for activity, Reposition, Cessation of Activity  Pain Rating Post-Intervention 1: 7/10      Objective:     Communicated with nurse Pedro prior to session.  Patient found supine with telemetry, oxygen, wound vac upon PT entry to room.     General Precautions: Standard, fall  Orthopedic Precautions: RLE non weight bearing  Braces: N/A  Respiratory Status: Nasal cannula, flow 2 L/min     Functional Mobility:  Bed Mobility:     Rolling Left:  moderate assistance  Supine to Sit: moderate assistance  Sit to Supine: minimum assistance  Transfers:     Sit to Stand:  supervision with rolling walker  Gait: 40 ft on Rw swing to 3 " point pattern  Balance: good sitting    Treatment & Education:  Patient seen for follow up progressive ambulation Post Op Day 3. Increased pain and assistance with bed mobility. Patient wants to sit up in chair but is about to be moved to another floor using bed.     Patient left supine with all lines intact, call button in reach, and Nurse Pedro present..    GOALS:   Multidisciplinary Problems       Physical Therapy Goals          Problem: Physical Therapy    Goal Priority Disciplines Outcome Goal Variances Interventions   Physical Therapy Goal     PT, PT/OT Ongoing, Progressing     Description: Goals to be met by: discharge     Patient will increase functional independence with mobility by performin. Patient will modified independent with bed mobility.  2. Patient will be modified independent with transfers.  3. Patient will ambulate 50 ft with RW  and supervision.                         Time Tracking:     PT Received On: 23  PT Start Time: 847     PT Stop Time: 902  PT Total Time (min): 15 min     Billable Minutes: Gait Training 15    Treatment Type: Treatment  PT/PTA: PT           2023

## 2023-06-19 LAB
ANION GAP SERPL CALC-SCNC: 6 MMOL/L (ref 8–16)
BASOPHILS # BLD AUTO: 0.01 K/UL (ref 0–0.2)
BASOPHILS NFR BLD: 0.2 % (ref 0–1.9)
BUN SERPL-MCNC: 10 MG/DL (ref 6–20)
CALCIUM SERPL-MCNC: 8.2 MG/DL (ref 8.7–10.5)
CHLORIDE SERPL-SCNC: 103 MMOL/L (ref 95–110)
CO2 SERPL-SCNC: 31 MMOL/L (ref 23–29)
CREAT SERPL-MCNC: 0.8 MG/DL (ref 0.5–1.4)
DIFFERENTIAL METHOD: ABNORMAL
EOSINOPHIL # BLD AUTO: 0.2 K/UL (ref 0–0.5)
EOSINOPHIL NFR BLD: 4.1 % (ref 0–8)
ERYTHROCYTE [DISTWIDTH] IN BLOOD BY AUTOMATED COUNT: 13.9 % (ref 11.5–14.5)
EST. GFR  (NO RACE VARIABLE): >60 ML/MIN/1.73 M^2
GLUCOSE SERPL-MCNC: 115 MG/DL (ref 70–110)
HCT VFR BLD AUTO: 23 % (ref 37–48.5)
HGB BLD-MCNC: 7.2 G/DL (ref 12–16)
IMM GRANULOCYTES # BLD AUTO: 0.03 K/UL (ref 0–0.04)
IMM GRANULOCYTES NFR BLD AUTO: 0.7 % (ref 0–0.5)
LYMPHOCYTES # BLD AUTO: 1.3 K/UL (ref 1–4.8)
LYMPHOCYTES NFR BLD: 29.2 % (ref 18–48)
MAGNESIUM SERPL-MCNC: 1.9 MG/DL (ref 1.6–2.6)
MCH RBC QN AUTO: 28.8 PG (ref 27–31)
MCHC RBC AUTO-ENTMCNC: 31.3 G/DL (ref 32–36)
MCV RBC AUTO: 92 FL (ref 82–98)
MONOCYTES # BLD AUTO: 0.5 K/UL (ref 0.3–1)
MONOCYTES NFR BLD: 11.4 % (ref 4–15)
NEUTROPHILS # BLD AUTO: 2.4 K/UL (ref 1.8–7.7)
NEUTROPHILS NFR BLD: 54.4 % (ref 38–73)
NRBC BLD-RTO: 0 /100 WBC
PLATELET # BLD AUTO: 207 K/UL (ref 150–450)
PMV BLD AUTO: 10 FL (ref 9.2–12.9)
POTASSIUM SERPL-SCNC: 3.6 MMOL/L (ref 3.5–5.1)
RBC # BLD AUTO: 2.5 M/UL (ref 4–5.4)
SODIUM SERPL-SCNC: 140 MMOL/L (ref 136–145)
WBC # BLD AUTO: 4.38 K/UL (ref 3.9–12.7)

## 2023-06-19 PROCEDURE — 36415 COLL VENOUS BLD VENIPUNCTURE: CPT | Performed by: SURGERY

## 2023-06-19 PROCEDURE — 80048 BASIC METABOLIC PNL TOTAL CA: CPT | Performed by: SURGERY

## 2023-06-19 PROCEDURE — 25000003 PHARM REV CODE 250: Performed by: SURGERY

## 2023-06-19 PROCEDURE — 97116 GAIT TRAINING THERAPY: CPT

## 2023-06-19 PROCEDURE — 25000003 PHARM REV CODE 250: Performed by: INTERNAL MEDICINE

## 2023-06-19 PROCEDURE — 94761 N-INVAS EAR/PLS OXIMETRY MLT: CPT

## 2023-06-19 PROCEDURE — 83735 ASSAY OF MAGNESIUM: CPT | Performed by: SURGERY

## 2023-06-19 PROCEDURE — 99900035 HC TECH TIME PER 15 MIN (STAT)

## 2023-06-19 PROCEDURE — 27000221 HC OXYGEN, UP TO 24 HOURS

## 2023-06-19 PROCEDURE — 63600175 PHARM REV CODE 636 W HCPCS: Performed by: INTERNAL MEDICINE

## 2023-06-19 PROCEDURE — 97530 THERAPEUTIC ACTIVITIES: CPT

## 2023-06-19 PROCEDURE — 85025 COMPLETE CBC W/AUTO DIFF WBC: CPT | Performed by: SURGERY

## 2023-06-19 PROCEDURE — 99900031 HC PATIENT EDUCATION (STAT)

## 2023-06-19 PROCEDURE — 12000002 HC ACUTE/MED SURGE SEMI-PRIVATE ROOM

## 2023-06-19 RX ORDER — HYDROMORPHONE HYDROCHLORIDE 1 MG/ML
1 INJECTION, SOLUTION INTRAMUSCULAR; INTRAVENOUS; SUBCUTANEOUS EVERY 8 HOURS PRN
Status: DISCONTINUED | OUTPATIENT
Start: 2023-06-19 | End: 2023-06-20

## 2023-06-19 RX ADMIN — OXYCODONE HYDROCHLORIDE 15 MG: 5 TABLET ORAL at 01:06

## 2023-06-19 RX ADMIN — PANTOPRAZOLE SODIUM 40 MG: 40 TABLET, DELAYED RELEASE ORAL at 05:06

## 2023-06-19 RX ADMIN — PREGABALIN 150 MG: 75 CAPSULE ORAL at 08:06

## 2023-06-19 RX ADMIN — OXYCODONE HYDROCHLORIDE 15 MG: 5 TABLET ORAL at 08:06

## 2023-06-19 RX ADMIN — DOCUSATE SODIUM 100 MG: 100 CAPSULE, LIQUID FILLED ORAL at 08:06

## 2023-06-19 RX ADMIN — HYDROMORPHONE HYDROCHLORIDE 1 MG: 1 INJECTION, SOLUTION INTRAMUSCULAR; INTRAVENOUS; SUBCUTANEOUS at 01:06

## 2023-06-19 RX ADMIN — OXYCODONE HYDROCHLORIDE 15 MG: 5 TABLET ORAL at 05:06

## 2023-06-19 RX ADMIN — PREGABALIN 150 MG: 75 CAPSULE ORAL at 09:06

## 2023-06-19 RX ADMIN — DOCUSATE SODIUM 100 MG: 100 CAPSULE, LIQUID FILLED ORAL at 09:06

## 2023-06-19 RX ADMIN — HYDROMORPHONE HYDROCHLORIDE 1 MG: 1 INJECTION, SOLUTION INTRAMUSCULAR; INTRAVENOUS; SUBCUTANEOUS at 09:06

## 2023-06-19 NOTE — PT/OT/SLP PROGRESS
Occupational Therapy   Treatment    Name: Carine Darden  MRN: [id number]  Admitting Diagnosis:  Critical limb ischemia of right lower extremity  3 Days Post-Op    Recommendations:     Discharge Recommendations: rehabilitation facility  Discharge Equipment Recommendations:  bedside commode  Barriers to discharge:  Decreased caregiver support    Assessment:     Carine Darden is a 57 y.o. female with a medical diagnosis of Critical limb ischemia of right lower extremity.  Performance deficits affecting function are weakness, impaired endurance, impaired self care skills, impaired functional mobility, gait instability, impaired balance, pain, impaired skin, edema, impaired cardiopulmonary response to activity.     Pt continues to progress towards goals; today she worked on increasing independence/safety with functional transfers.      Rehab Prognosis:  Good; patient would benefit from acute skilled OT services to address these deficits and reach maximum level of function.       Plan:     Patient to be seen 6 x/week to address the above listed problems via self-care/home management, therapeutic activities, therapeutic exercises, wheelchair management/training  Plan of Care Expires: 07/17/23  Plan of Care Reviewed with: patient    Subjective     Pain/Comfort:  Pain Rating 1: 7/10  Location - Side 1: Right  Location 1:  (residual limb)  Pain Addressed 1: Pre-medicate for activity, Distraction, Reposition  Pain Rating Post-Intervention 1: 7/10    Objective:     Communicated with: nurse prior to session.  Patient found supine with telemetry upon OT entry to room.    General Precautions: Standard, fall    Orthopedic Precautions:RLE non weight bearing  Braces: N/A  Respiratory Status: Room air     Occupational Performance:     Bed Mobility:    Patient completed Supine to Sit with stand by assistance     Functional Mobility/Transfers:  Patient completed Sit <> Stand Transfer with contact guard assistance  with  rolling walker    Patient completed Bed <> Chair Transfer using Stand Pivot technique with contact guard assistance with rolling walker  Patient completed Toilet Transfer Stand Pivot technique with contact guard assistance with  rolling walker and bedside commode; cues from OT to encourage safest technique for sit<->stand t/f hand placement with RW    Patient left up in chair with all lines intact and call button in reach    GOALS:   Multidisciplinary Problems       Occupational Therapy Goals          Problem: Occupational Therapy    Goal Priority Disciplines Outcome Interventions   Occupational Therapy Goal     OT, PT/OT Ongoing, Progressing    Description: Goals to be met by: 7/10/2023     Patient will increase functional independence with ADLs by performing:    LE Dressing with Modified [place].  Toileting from bedside commode with Modified [place] for hygiene and clothing management.   Toilet transfer to bedside commode with Modified [place].                         Time Tracking:     OT Date of Treatment: 06/19/23  OT Start Time: 1355  OT Stop Time: 1410  OT Total Time (min): 15 min    Billable Minutes:Therapeutic Activity 15    OT/ERMA: OT          6/19/2023

## 2023-06-19 NOTE — PT/OT/SLP PROGRESS
Physical Therapy Treatment    Patient Name:  Carine Darden   MRN:  [id number]    Recommendations:     Discharge Recommendations: rehabilitation facility  Discharge Equipment Recommendations:  (TBD at next level of care)  Barriers to discharge: Decreased caregiver support    Assessment:     Carine Darden is a 57 y.o. female admitted with a medical diagnosis of Critical limb ischemia of right lower extremity.  She presents with the following impairments/functional limitations: weakness, impaired endurance, impaired functional mobility, gait instability, impaired balance, decreased lower extremity function, pain, edema, impaired skin, orthopedic precautions.    Pt found sitting up in chair and motivated to participate with PT despite pain in R limb. Pt tolerated session well despite pain complaints and required moderate to minimal A of 1-2 persons for transfers/gait training with RW. Pt is an excellent rehab candidate and would benefit from the medical supervision/management provided in the rehab environment to oversee her complicated medical history related to cancer diagnosis.    Rehab Prognosis: Good and Fair; patient would benefit from acute skilled PT services to address these deficits and reach maximum level of function.    Recent Surgery: Procedure(s) (LRB):  AMPUTATION, ABOVE KNEE (Right) 3 Days Post-Op    Plan:     During this hospitalization, patient to be seen daily to address the identified rehab impairments via gait training, therapeutic activities, therapeutic exercises and progress toward the following goals:    Plan of Care Expires:  07/17/23    Subjective     Chief Complaint: pain/pressure R limb  Patient/Family Comments/goals: Rehab  Pain/Comfort:  Pain Rating 1: 8/10  Location - Side 1: Right  Location 1: thigh  Pain Addressed 1: Reposition, Distraction, Cessation of Activity  Pain Rating Post-Intervention 1: 10/10      Objective:     Communicated with RN prior to session.  Patient found up in chair with  telemetry, oxygen, wound vac upon PT entry to room.     General Precautions: Standard, fall  Orthopedic Precautions: RLE non weight bearing  Braces: N/A  Respiratory Status: Room air     Functional Mobility:  Transfers:     Sit to Stand:  minimum assistance, moderate assistance, of 1-2 persons, and vc during stand > sit transfer to flex at R hip in order to avoid direct pressure on short AKA limb with rolling walker and vc for technique  Gait: x 40 feet with RW and moderate to minimal assistance of 1 person with pt needing a standing rest at [place] due to pain/fatigue.      AM-PAC 6 CLICK MOBILITY          Treatment & Education:  Pt was educated on the following: call light use, importance of OOB activity and functional mobility to negate the negative effects of prolonged bed rest during this hospitalization, safe transfers/ambulation and discharge planning recommendations/options.      Patient left up in chair with all lines intact and call button in reach..    GOALS:   Multidisciplinary Problems       Physical Therapy Goals          Problem: Physical Therapy    Goal Priority Disciplines Outcome Goal Variances Interventions   Physical Therapy Goal     PT, PT/OT Ongoing, Progressing     Description: Goals to be met by: discharge     Patient will increase functional independence with mobility by performin. Patient will modified independent with bed mobility.  2. Patient will be modified independent with transfers.  3. Patient will ambulate 50 ft with RW  and supervision.                         Time Tracking:     PT Received On: 23  PT Start Time: 1418     PT Stop Time: 1430  PT Total Time (min): 12 min     Billable Minutes: Gait Training 12    Treatment Type: Treatment  PT/PTA: PT           2023

## 2023-06-19 NOTE — PLAN OF CARE
06/19/23 0955   Discharge Reassessment   Assessment Type Discharge Planning Reassessment   Did the patient's condition or plan change since previous assessment? Yes   Discharge Plan discussed with: Patient   Communicated SHONDA with patient/caregiver Yes   Discharge Plan A Rehab   Discharge Plan B Skilled Nursing Facility   Why the patient remains in the hospital Requires continued medical care   Post-Acute Status   Post-Acute Authorization Placement   Post-Acute Placement Status Referrals Sent   Patient choice form signed by patient/caregiver List with quality metrics by geographic area provided     Therapy recommendations for  rehab noted- Case management to meet with patient on today to discuss discharge plan.     Dorota with [facility] notified of rehab consult via secure chat.     1554 - per Dorota with [facility], patient clinically accepted and to submit for payor authorization on today once therapy notes are in.

## 2023-06-19 NOTE — CARE UPDATE
Lonnie Afternoon,    Presently,  SHANA SHEEHAN  meets InterQual® criteria for Rehab placement, pending a Preadmission assessment completed by a licensed or certified clinician and rehab physician agreement with findings.  Please note, if there is a change in the patient's clinical status or treatments needed, a new review will be necessary.    *These review findings are based on InterQual® guidelines and information documented in the patient's Epic EMR.  They do not substitute the provider's judgement for medical necessity.  Thank You

## 2023-06-19 NOTE — PROGRESS NOTES
"[facility] Medicine Progress Note  Patient Name: Carine Darden MRN: [id number]   Patient Class: IP- Inpatient  Length of Stay: 7   Admission Date: 6/12/2023 11:06 AM Attending Physician: Jasmeet Balbuena MD   Primary Care Provider: Inocente Guillermo NP Face-to-Face encounter date: 06/19/2023   Chief Complaint: Leg Pain (Pt R foot is cool to the touch. 10/10 pain stated by pt. Started 9pm last night. Pt states "numbness and tingling" and hip pain. )      Subjective:    Interval History     Pain okay controlled. Participating with PT/OT. Doing great and looking great. Got herself dressed and sitting on side of the bed today.  Will clarify with surgery regarding when medically clear for hospital discharge.  Therapy is recommending rehab.       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  Vascular surgery consulted.  Heparin gtt started. 6/12 right iliac percutaneous  thrombectomy and stent to right common iliac with cath placed for tpa infusion.  She was brought back to the OR with Dr. Aleman and intra operative findings show persistent right popliteal thrombosis with diffuse thrombus in the posterior tibial and peroneal arteries.   Exploration of these vessels revealed changes to suggest these may be chronic. Revascularization was not attempted given concern that it would disrupt more collaterals. Discussed with Dr. Aleman who recommends amputation. General surgery consulted and s/p right AKA 6/16. Has required blood transfusion from H/H trending down likely from oozing from lower extremity angiogram incision sites.     Review of Systems   All other Review of Systems were found to be negative expect for that mentioned already in HPI.     Hospital Course     06/19/2023     docusate sodium  100 mg Oral BID    pantoprazole  " "40 mg Oral Daily    pregabalin  150 mg Oral BID       sodium chloride, acetaminophen, acetaminophen, aluminum-magnesium hydroxide-simethicone, dextrose 50%, dextrose 50%, glucagon (human recombinant), glucose, glucose, HYDROmorphone, magnesium sulfate IVPB, magnesium sulfate IVPB, melatonin, naloxone, ondansetron, oxyCODONE, potassium bicarbonate, potassium bicarbonate, potassium bicarbonate, prochlorperazine, simethicone, sodium chloride 0.9%      Objective:   Physical Exam  /65   Pulse 96   Temp 98.2 °F (36.8 °C) (Oral)   Resp 18   Ht 5' 2" (1.575 m)   Wt 96.3 kg (212 lb 4.9 oz)   SpO2 95%   BMI 38.83 kg/m²   Constitutional: No distress.   HENT: Atraumatic.   Cardiovascular: Normal rate, regular rhythm and normal heart sounds.   Pulmonary/Chest: Effort normal. Clear to auscultation bilaterally. No wheezes.   Abdominal: Soft. Bowel sounds are normal. Exhibits no distension and no mass. No tenderness  Neurological: Alert.   Skin: Skin is warm and dry.   Right AKA    Labs and Imaging    Significant Labs: All pertinent labs within the past 24 hours have been reviewed.    Significant Imaging: I have reviewed all pertinent imaging results/findings within the past 24 hours.    I have reviewed the Vitals, labs and imaging as above.     Assessment & Plan:   Carine Darden is a 57 y.o. female admitted for    Active Hospital Problems    Diagnosis    *Critical limb ischemia of right lower extremity    Colostomy status    Essential (primary) hypertension    Radiation colitis    Malignant neoplasm of cervix       Plan  Vascular Surgical intervention not successful. General surgery consulted and patient went right AKA 6/16.  Routine post op care. Wound vac to stump.  Pain control.  Medication adjusted.  Appears much more comfortable. Plan to wean off IV pain medication in next 1-1.5 days.  S/p blood transfusion.  Had been oozing from angiogram leg incisions.  Monitoring.  Watching low grade temperature.  No overt " infection detected.  Seemingly resolved.   AM labs ordered.  Continuing vascular monitoring   PT/OT.  Recommending rehab.  I will clarify with surgery when she is medically clear for rehab.     Active PT: Yes  Active OT: Yes  Active SLP: No    Core measures:  - Code status:   - Diet: Cardiac  VTE Risk Mitigation (From admission, onward)           Ordered     IP VTE HIGH RISK PATIENT  Once         06/13/23 0705     Place sequential compression device  Until discontinued         06/13/23 0705     Reason for No Pharmacological VTE Prophylaxis  Once        Question:  Reasons:  Answer:  IV Heparin w/in 24 hrs. Pre or Post-Op    06/13/23 0705                    Discharge Planning:   Discharge Planning   SHONDA: 06/16    Code Status: Full Code   Is the patient medically ready for discharge?: no    Reason for patient still in hospital (select all that apply): Patient trending condition  Discharge Plan A: Home with assistance from family        Above encounter included review of the medical records, interviewing and examining the patient face-to-face, discussion with family and other health care providers, ordering and interpreting lab/test results and formulating a plan of care.     Medical Decision Making:  [] Low Complexity  [] Moderate Complexity  [x] High Complexity    Og Foley MD  [facility] Hospitalist  06/19/2023

## 2023-06-19 NOTE — PROGRESS NOTES
"[facility] Medicine Progress Note  Patient Name: Carine Darden MRN: [id number]   Patient Class: IP- Inpatient  Length of Stay: 6   Admission Date: 6/12/2023 11:06 AM Attending Physician: Jasmeet Balbuena MD   Primary Care Provider: Inocente Guillermo NP Face-to-Face encounter date: 06/18/2023   Chief Complaint: Leg Pain (Pt R foot is cool to the touch. 10/10 pain stated by pt. Started 9pm last night. Pt states "numbness and tingling" and hip pain. )      Subjective:    Interval History     Pain okay controlled though she felt she waited too long to ask for her pain medication and got behind her pain earlier today.  Participating with PT/OT.       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  Vascular surgery consulted.  Heparin gtt started. 6/12 right iliac percutaneous  thrombectomy and stent to right common iliac with cath placed for tpa infusion.  She was brought back to the OR with Dr. Aleman and intra operative findings show persistent right popliteal thrombosis with diffuse thrombus in the posterior tibial and peroneal arteries.   Exploration of these vessels revealed changes to suggest these may be chronic. Revascularization was not attempted given concern that it would disrupt more collaterals. Discussed with Dr. Aleman who recommends amputation. General surgery consulted and s/p right AKA 6/16. Has required blood transfusion from H/H trending down likely from oozing from lower extremity angiogram incision sites.     Review of Systems   All other Review of Systems were found to be negative expect for that mentioned already in HPI.     Hospital Course     06/18/2023     docusate sodium  100 mg Oral BID    pantoprazole  40 mg Oral Daily    pregabalin  150 mg Oral BID       sodium chloride, acetaminophen, " "acetaminophen, aluminum-magnesium hydroxide-simethicone, dextrose 50%, dextrose 50%, glucagon (human recombinant), glucose, glucose, HYDROmorphone, magnesium sulfate IVPB, magnesium sulfate IVPB, melatonin, naloxone, ondansetron, oxyCODONE, potassium bicarbonate, potassium bicarbonate, potassium bicarbonate, prochlorperazine, simethicone, sodium chloride 0.9%      Objective:   Physical Exam  BP 98/60 Comment: notified nurse erick  Pulse 90   Temp 98.9 °F (37.2 °C) (Oral)   Resp 17   Ht 5' 2" (1.575 m)   Wt 96.3 kg (212 lb 4.9 oz)   SpO2 (!) 89%   BMI 38.83 kg/m²   Constitutional: No distress.   HENT: Atraumatic.   Cardiovascular: Normal rate, regular rhythm and normal heart sounds.   Pulmonary/Chest: Effort normal. Clear to auscultation bilaterally. No wheezes.   Abdominal: Soft. Bowel sounds are normal. Exhibits no distension and no mass. No tenderness  Neurological: Alert.   Skin: Skin is warm and dry.   Right AKA    Labs and Imaging    Significant Labs: All pertinent labs within the past 24 hours have been reviewed.    Significant Imaging: I have reviewed all pertinent imaging results/findings within the past 24 hours.    I have reviewed the Vitals, labs and imaging as above.     Assessment & Plan:   Carine Darden is a 57 y.o. female admitted for    Active Hospital Problems    Diagnosis    *Critical limb ischemia of right lower extremity    Colostomy status    Essential (primary) hypertension    Radiation colitis    Malignant neoplasm of cervix       Plan  Vascular Surgical intervention not successful. General surgery consulted and patient went right AKA 6/16.  Routine post op care.  Pain control.  Medication adjusted.  Appears much more comfortable. Plan to wean off IV pain medication in next 1-1.5 days.  S/p blood transfusion.  Had been oozing from angiogram leg incisions.  Monitoring.  Watching low grade temperature.  No overt infection detected.  AM labs ordered.  Continuing vascular monitoring "   PT/OT    Active PT: Yes  Active OT: Yes  Active SLP: No    Core measures:  - Code status:   - Diet: Cardiac  VTE Risk Mitigation (From admission, onward)           Ordered     IP VTE HIGH RISK PATIENT  Once         06/13/23 0705     Place sequential compression device  Until discontinued         06/13/23 0705     Reason for No Pharmacological VTE Prophylaxis  Once        Question:  Reasons:  Answer:  IV Heparin w/in 24 hrs. Pre or Post-Op    06/13/23 0705                    Discharge Planning:   Discharge Planning   SHONDA: 06/16    Code Status: Full Code   Is the patient medically ready for discharge?: no    Reason for patient still in hospital (select all that apply): Patient trending condition  Discharge Plan A: Home with assistance from family        Above encounter included review of the medical records, interviewing and examining the patient face-to-face, discussion with family and other health care providers, ordering and interpreting lab/test results and formulating a plan of care.     Medical Decision Making:  [] Low Complexity  [] Moderate Complexity  [x] High Complexity    Og Foley MD  [facility] Hospitalist  06/18/2023

## 2023-06-20 LAB
ANION GAP SERPL CALC-SCNC: 8 MMOL/L (ref 8–16)
BASOPHILS # BLD AUTO: 0.01 K/UL (ref 0–0.2)
BASOPHILS NFR BLD: 0.2 % (ref 0–1.9)
BUN SERPL-MCNC: 13 MG/DL (ref 6–20)
CALCIUM SERPL-MCNC: 8.3 MG/DL (ref 8.7–10.5)
CHLORIDE SERPL-SCNC: 102 MMOL/L (ref 95–110)
CO2 SERPL-SCNC: 31 MMOL/L (ref 23–29)
CREAT SERPL-MCNC: 0.7 MG/DL (ref 0.5–1.4)
DIFFERENTIAL METHOD: ABNORMAL
EOSINOPHIL # BLD AUTO: 0.2 K/UL (ref 0–0.5)
EOSINOPHIL NFR BLD: 3.9 % (ref 0–8)
ERYTHROCYTE [DISTWIDTH] IN BLOOD BY AUTOMATED COUNT: 14 % (ref 11.5–14.5)
EST. GFR  (NO RACE VARIABLE): >60 ML/MIN/1.73 M^2
GLUCOSE SERPL-MCNC: 127 MG/DL (ref 70–110)
HCT VFR BLD AUTO: 24 % (ref 37–48.5)
HGB BLD-MCNC: 7.5 G/DL (ref 12–16)
IMM GRANULOCYTES # BLD AUTO: 0.04 K/UL (ref 0–0.04)
IMM GRANULOCYTES NFR BLD AUTO: 0.9 % (ref 0–0.5)
LYMPHOCYTES # BLD AUTO: 0.8 K/UL (ref 1–4.8)
LYMPHOCYTES NFR BLD: 17.2 % (ref 18–48)
MAGNESIUM SERPL-MCNC: 1.8 MG/DL (ref 1.6–2.6)
MCH RBC QN AUTO: 28.8 PG (ref 27–31)
MCHC RBC AUTO-ENTMCNC: 31.3 G/DL (ref 32–36)
MCV RBC AUTO: 92 FL (ref 82–98)
MONOCYTES # BLD AUTO: 0.4 K/UL (ref 0.3–1)
MONOCYTES NFR BLD: 9.5 % (ref 4–15)
NEUTROPHILS # BLD AUTO: 3.2 K/UL (ref 1.8–7.7)
NEUTROPHILS NFR BLD: 68.3 % (ref 38–73)
NRBC BLD-RTO: 0 /100 WBC
PLATELET # BLD AUTO: 253 K/UL (ref 150–450)
PMV BLD AUTO: 9.6 FL (ref 9.2–12.9)
POTASSIUM SERPL-SCNC: 3.8 MMOL/L (ref 3.5–5.1)
RBC # BLD AUTO: 2.6 M/UL (ref 4–5.4)
SODIUM SERPL-SCNC: 141 MMOL/L (ref 136–145)
WBC # BLD AUTO: 4.64 K/UL (ref 3.9–12.7)

## 2023-06-20 PROCEDURE — 80048 BASIC METABOLIC PNL TOTAL CA: CPT | Performed by: SURGERY

## 2023-06-20 PROCEDURE — 83735 ASSAY OF MAGNESIUM: CPT | Performed by: SURGERY

## 2023-06-20 PROCEDURE — 36415 COLL VENOUS BLD VENIPUNCTURE: CPT | Performed by: SURGERY

## 2023-06-20 PROCEDURE — 97116 GAIT TRAINING THERAPY: CPT

## 2023-06-20 PROCEDURE — 85025 COMPLETE CBC W/AUTO DIFF WBC: CPT | Performed by: SURGERY

## 2023-06-20 PROCEDURE — 25000003 PHARM REV CODE 250: Performed by: SURGERY

## 2023-06-20 PROCEDURE — 99900035 HC TECH TIME PER 15 MIN (STAT)

## 2023-06-20 PROCEDURE — 97110 THERAPEUTIC EXERCISES: CPT

## 2023-06-20 PROCEDURE — 63600175 PHARM REV CODE 636 W HCPCS: Mod: UD | Performed by: INTERNAL MEDICINE

## 2023-06-20 PROCEDURE — 94761 N-INVAS EAR/PLS OXIMETRY MLT: CPT

## 2023-06-20 PROCEDURE — 12000002 HC ACUTE/MED SURGE SEMI-PRIVATE ROOM

## 2023-06-20 PROCEDURE — 25000003 PHARM REV CODE 250: Performed by: INTERNAL MEDICINE

## 2023-06-20 RX ADMIN — OXYCODONE HYDROCHLORIDE 15 MG: 5 TABLET ORAL at 06:06

## 2023-06-20 RX ADMIN — PREGABALIN 150 MG: 75 CAPSULE ORAL at 09:06

## 2023-06-20 RX ADMIN — OXYCODONE HYDROCHLORIDE 15 MG: 5 TABLET ORAL at 09:06

## 2023-06-20 RX ADMIN — PANTOPRAZOLE SODIUM 40 MG: 40 TABLET, DELAYED RELEASE ORAL at 05:06

## 2023-06-20 RX ADMIN — DOCUSATE SODIUM 100 MG: 100 CAPSULE, LIQUID FILLED ORAL at 09:06

## 2023-06-20 RX ADMIN — OXYCODONE HYDROCHLORIDE 15 MG: 5 TABLET ORAL at 02:06

## 2023-06-20 RX ADMIN — DOCUSATE SODIUM 100 MG: 100 CAPSULE, LIQUID FILLED ORAL at 08:06

## 2023-06-20 RX ADMIN — HYDROMORPHONE HYDROCHLORIDE 1 MG: 1 INJECTION, SOLUTION INTRAMUSCULAR; INTRAVENOUS; SUBCUTANEOUS at 08:06

## 2023-06-20 RX ADMIN — PREGABALIN 150 MG: 75 CAPSULE ORAL at 08:06

## 2023-06-20 NOTE — NURSING
Called to patients room; staff reports patient noted on floor. Patient denies hitting head; patient last seen by this nurse @ 0524 sitting in chair next to bed, walker in front of patient; no foot wear on. Medical team notified via secured chat.

## 2023-06-20 NOTE — PT/OT/SLP PROGRESS
Occupational Therapy   Treatment    Name: Carine Darden  MRN: [id number]  Admitting Diagnosis:  Critical limb ischemia of right lower extremity  4 Days Post-Op    Recommendations:     Discharge Recommendations: rehabilitation facility  Discharge Equipment Recommendations:  bedside commode  Barriers to discharge:  Decreased caregiver support    Assessment:     Carine Darden is a 57 y.o. female with a medical diagnosis of Critical limb ischemia of right lower extremity.  Performance deficits affecting function are weakness, impaired endurance, impaired self care skills, impaired functional mobility, gait instability, impaired balance, pain, impaired skin, impaired cardiopulmonary response to activity.     Pt is an excellent rehab candidate and would benefit from the medical supervision/management provided in the rehab environment to oversee her complicated medical history related to cancer diagnosis.  She remains highly motivated and is progressing towards her goals.      Rehab Prognosis:  Good; patient would benefit from acute skilled OT services to address these deficits and reach maximum level of function.       Plan:     Patient to be seen 6 x/week to address the above listed problems via self-care/home management, therapeutic activities, therapeutic exercises, wheelchair management/training  Plan of Care Expires: 07/17/23  Plan of Care Reviewed with: patient    Subjective     Pain/Comfort:  Pain Rating 1:  (not rated)  Pain Rating Post-Intervention 1:  (not rated)    Objective:     Communicated with: nurse prior to session.  Patient found supine with telemetry upon OT entry to room.    General Precautions: Standard, fall    Orthopedic Precautions:RLE non weight bearing  Braces: N/A  Respiratory Status: Room air     Occupational Performance:     Bed Mobility:    Patient completed Scooting/Bridging with supervision  Patient completed Supine to Sit with supervision  Patient completed Sit to Supine with supervision      Treatment & Education:  Pt provided with red theraband (medium resistance) and completed ten repetitions of therex in all major joints/planes while seated EOB; she required SBA for safety; she was able to maintain sitting balance throughout exercise completion unassisted.      Patient left HOB elevated with all lines intact, call button in reach, and bed alarm on    GOALS:   Multidisciplinary Problems       Occupational Therapy Goals          Problem: Occupational Therapy    Goal Priority Disciplines Outcome Interventions   Occupational Therapy Goal     OT, PT/OT Ongoing, Progressing    Description: Goals to be met by: 7/10/2023     Patient will increase functional independence with ADLs by performing:    LE Dressing with Modified [place].  Toileting from bedside commode with Modified [place] for hygiene and clothing management.   Toilet transfer to bedside commode with Modified [place].                         Time Tracking:     OT Date of Treatment: 06/20/23  OT Start Time: 1326  OT Stop Time: 1349  OT Total Time (min): 23 min    Billable Minutes:Therapeutic Exercise 23    OT/ERMA: OT          6/20/2023

## 2023-06-20 NOTE — PT/OT/SLP PROGRESS
"Physical Therapy Treatment    Patient Name:  Carine Darden   MRN:  [id number]    Recommendations:     Discharge Recommendations: rehabilitation facility  Discharge Equipment Recommendations:  (TBD at next level of care)  Barriers to discharge: Decreased caregiver support    Assessment:     Carine Darden is a 57 y.o. female admitted with a medical diagnosis of Critical limb ischemia of right lower extremity.  She presents with the following impairments/functional limitations: weakness, impaired endurance, impaired functional mobility, gait instability, impaired balance, decreased lower extremity function, pain, edema, impaired skin, orthopedic precautions.    Pt found HOB elevated and agreeable to PT session. Pt eager to work with PT and is very motivated to regain her independence. Pt reported she attempted to get from chair back to bed by herself and "slid down to the floor, but I didn't hurt myself." Pt tolerated session fairly well today regarding mobility, but teared up a few times regarding her current situation. The pt has great rehab potential if she can focus on safety and be patient with herself until she's more independent with transfers.    Rehab Prognosis: Good and Fair; patient would benefit from acute skilled PT services to address these deficits and reach maximum level of function.    Recent Surgery: Procedure(s) (LRB):  AMPUTATION, ABOVE KNEE (Right) 4 Days Post-Op    Plan:     During this hospitalization, patient to be seen daily to address the identified rehab impairments via gait training, therapeutic activities, therapeutic exercises and progress toward the following goals:    Plan of Care Expires:  07/17/23    Subjective     Chief Complaint: Pt cried a few times during session  Patient/Family Comments/goals: Rehab  Pain/Comfort:  Pain Rating 1:  (not rated)  Location - Side 1: Right  Location 1: leg  Pain Addressed 1: Reposition, Distraction      Objective:     Communicated with MAYI Perry prior to " session.  Patient found HOB elevated with bed alarm, peripheral IV, telemetry, wound vac upon PT entry to room.     General Precautions: Standard, fall  Orthopedic Precautions: RLE non weight bearing  Braces: N/A  Respiratory Status: Room air     Functional Mobility:  Bed Mobility:     Scooting: stand by assistance and contact guard assistance  Supine to Sit: stand by assistance and contact guard assistance  Transfers:     Sit to Stand:  minimum assistance with rolling walker  Gait: x 50' with rolling walker and minimum assistance to steady.      AM-PAC 6 CLICK MOBILITY          Treatment & Education:  Pt was educated on the following: call light use, importance of OOB activity and functional mobility to negate the negative effects of prolonged bed rest during this hospitalization, safe transfers/ambulation and discharge planning recommendations/options. PT also educated pt on LE TE for maximum readiness for prosthesis in event she is a candidate.       Patient left up in chair with all lines intact, call button in reach, and chair alarm on..    GOALS:   Multidisciplinary Problems       Physical Therapy Goals          Problem: Physical Therapy    Goal Priority Disciplines Outcome Goal Variances Interventions   Physical Therapy Goal     PT, PT/OT Ongoing, Progressing     Description: Goals to be met by: discharge     Patient will increase functional independence with mobility by performin. Patient will modified independent with bed mobility.  2. Patient will be modified independent with transfers.  3. Patient will ambulate 50 ft with RW  and supervision.                         Time Tracking:     PT Received On: 23  PT Start Time: 0950     PT Stop Time: 1009  PT Total Time (min): 19 min     Billable Minutes: Gait Training 19    Treatment Type: Treatment  PT/PTA: PT           2023

## 2023-06-20 NOTE — CARE UPDATE
06/19/23 2137   Patient Assessment/Suction   Level of Consciousness (AVPU) alert   Respiratory Effort Normal;Unlabored   Expansion/Accessory Muscles/Retractions no retractions;no use of accessory muscles   All Lung Fields Breath Sounds clear;equal bilaterally   Rhythm/Pattern, Respiratory no shortness of breath reported;pattern regular;unlabored   Cough Frequency no cough   PRE-TX-O2   Device (Oxygen Therapy) nasal cannula   $ Is the patient on Low Flow Oxygen? Yes   Flow (L/min) 2   SpO2 95 %   Pulse Oximetry Type Intermittent   $ Pulse Oximetry - Multiple Charge Pulse Oximetry - Multiple   Incentive Spirometer   $ Incentive Spirometer Charges done independently per patient   Administration (IS) instruction provided, follow-up;mouthpiece utilized;proper technique demonstrated   Number of Repetitions (IS) 10   Level Incentive Spirometer (mL) 1500   Patient Tolerance (IS) good;no adverse signs/symptoms present   Education   $ Education Other (see comment);15 min

## 2023-06-20 NOTE — PLAN OF CARE
met with Pt at bedside to provide update on discharge plan.  informed Pt of [id number]. Pt verbalized understanding.  asked Pt for skilled-nursing preferences. Pt stated she would not like to pursue skilled nursing placement if inpatient rehab is declined. Pt would prefer to discharge home with home health. Pt stated preference is [facility].       06/20/23 1231   Discharge Reassessment   Assessment Type Discharge Planning Reassessment   Did the patient's condition or plan change since previous assessment? No   Discharge Plan discussed with: Patient   Communicated SHONDA with patient/caregiver Date not available/Unable to determine   Discharge Plan A Rehab   Discharge Plan B Home Health   DME Needed Upon Discharge  none   Transition of Care Barriers None   Why the patient remains in the hospital Requires continued medical care   Post-Acute Status   Post-Acute Authorization Placement   Post-Acute Placement Status Pending payor medical review/second level review   Coverage Payor:  [employer] MANAGED MEDICARE - [employer] MED[employer] PPO   Patient choice form signed by patient/caregiver List from CMS Compare   Discharge Delays None known at this time

## 2023-06-20 NOTE — PLAN OF CARE
06/20/23 0920   Post-Acute Status   Post-Acute Authorization Placement   Post-Acute Placement Status Pending payor review/awaiting authorization (if required)     Patient clinically accepted to [facility], IPR authorization pending at this time.     1255 - notified by [employer] that IP rehab denied. Secure chat sent to Dr. Foley inquiring about peer to peer or pursuing home health on discharge (patient adamant about no SNF on discharge at this time). Awaiting response at this time.     1440 - Per Katherine, patient clinically accepted to [facility]. Dr. Foley to meet with patient on today to discuss SNF placement on hospital discharge.

## 2023-06-20 NOTE — NURSING
Patient in bed; bed alarm on; fall risk band on; educated patient importance of using call light for assistance with ambulation and transfers; patient verbalized understanding.

## 2023-06-20 NOTE — PROGRESS NOTES
Right AKA stump dressing taken down.  Incision appears to be healing well without signs of ischemia or significant underlying fluid collection. Pain adequately controlled. Medial thigh incision dressing remains in place.  Some superficial ischemia which has been present for the past week. No significant changes.  Okay for discharge to rehab facility or home with home physical therapy when medically cleared. Referral being sent to prosthesis company for stump .    Please call with any questions or concerns.    Gagan Gamez MD  General Surgery  [phone]

## 2023-06-20 NOTE — PLAN OF CARE
Problem: Occupational Therapy  Goal: Occupational Therapy Goal  Description: Goals to be met by: 7/10/2023     Patient will increase functional independence with ADLs by performing:    LE Dressing with Modified [place].  Toileting from bedside commode with Modified [place] for hygiene and clothing management.   Toilet transfer to bedside commode with Modified [place].    Outcome: Ongoing, Progressing

## 2023-06-21 VITALS
WEIGHT: 212.31 LBS | DIASTOLIC BLOOD PRESSURE: 94 MMHG | HEART RATE: 85 BPM | OXYGEN SATURATION: 96 % | HEIGHT: 62 IN | SYSTOLIC BLOOD PRESSURE: 135 MMHG | RESPIRATION RATE: 20 BRPM | BODY MASS INDEX: 39.07 KG/M2 | TEMPERATURE: 98 F

## 2023-06-21 PROBLEM — I70.221 CRITICAL LIMB ISCHEMIA OF RIGHT LOWER EXTREMITY: Status: RESOLVED | Noted: 2023-06-12 | Resolved: 2023-06-21

## 2023-06-21 PROCEDURE — 25000003 PHARM REV CODE 250: Performed by: INTERNAL MEDICINE

## 2023-06-21 PROCEDURE — 25000003 PHARM REV CODE 250: Performed by: SURGERY

## 2023-06-21 PROCEDURE — 94799 UNLISTED PULMONARY SVC/PX: CPT

## 2023-06-21 PROCEDURE — 94760 N-INVAS EAR/PLS OXIMETRY 1: CPT

## 2023-06-21 PROCEDURE — 30200315 PPD INTRADERMAL TEST REV CODE 302: Performed by: INTERNAL MEDICINE

## 2023-06-21 PROCEDURE — 97116 GAIT TRAINING THERAPY: CPT

## 2023-06-21 PROCEDURE — 97535 SELF CARE MNGMENT TRAINING: CPT

## 2023-06-21 PROCEDURE — 63600175 PHARM REV CODE 636 W HCPCS: Mod: UD | Performed by: INTERNAL MEDICINE

## 2023-06-21 PROCEDURE — 86580 TB INTRADERMAL TEST: CPT | Performed by: INTERNAL MEDICINE

## 2023-06-21 RX ORDER — OXYCODONE HYDROCHLORIDE 15 MG/1
15 TABLET ORAL EVERY 4 HOURS PRN
Refills: 0
Start: 2023-06-21 | End: 2023-07-04 | Stop reason: SDUPTHER

## 2023-06-21 RX ORDER — DOCUSATE SODIUM 100 MG/1
100 CAPSULE, LIQUID FILLED ORAL 2 TIMES DAILY
Refills: 0
Start: 2023-06-21

## 2023-06-21 RX ADMIN — DOCUSATE SODIUM 100 MG: 100 CAPSULE, LIQUID FILLED ORAL at 07:06

## 2023-06-21 RX ADMIN — TUBERCULIN PURIFIED PROTEIN DERIVATIVE 5 UNITS: 5 INJECTION, SOLUTION INTRADERMAL at 02:06

## 2023-06-21 RX ADMIN — PANTOPRAZOLE SODIUM 40 MG: 40 TABLET, DELAYED RELEASE ORAL at 05:06

## 2023-06-21 RX ADMIN — OXYCODONE HYDROCHLORIDE 15 MG: 5 TABLET ORAL at 05:06

## 2023-06-21 RX ADMIN — PREGABALIN 150 MG: 75 CAPSULE ORAL at 07:06

## 2023-06-21 RX ADMIN — OXYCODONE HYDROCHLORIDE 15 MG: 5 TABLET ORAL at 01:06

## 2023-06-21 RX ADMIN — OXYCODONE HYDROCHLORIDE 15 MG: 5 TABLET ORAL at 10:06

## 2023-06-21 RX ADMIN — POTASSIUM BICARBONATE 50 MEQ: 977.5 TABLET, EFFERVESCENT ORAL at 07:06

## 2023-06-21 RX ADMIN — ACETAMINOPHEN 650 MG: 325 TABLET ORAL at 07:06

## 2023-06-21 RX ADMIN — MAGNESIUM SULFATE 2 G: 2 INJECTION INTRAVENOUS at 07:06

## 2023-06-21 NOTE — HOSPITAL COURSE
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  Vascular surgery consulted.  Heparin gtt started. 6/12 right iliac percutaneous  thrombectomy and stent to right common iliac with cath placed for tpa infusion.  She was brought back to the OR with Dr. Aleman and intra operative findings show persistent right popliteal thrombosis with diffuse thrombus in the posterior tibial and peroneal arteries.   Exploration of these vessels revealed changes to suggest these may be chronic. Revascularization was not attempted given concern that it would disrupt more collaterals. Discussed with Dr. Aleman who recommends amputation. General surgery consulted and s/p right AKA 6/16. Has required blood transfusion from H/H trending down likely from oozing from lower extremity angiogram incision sites. Pain okay controlled. Participating with PT/OT. S/p take down of wound vac.  Unfortunately, [facility] denied inpatient rehab.  She did agree to SNF. Discharge to SNF with pain medication and plan to follow up with Surgery outpatient.  Routine wound care.  Referral for prosthesis made by General Surgery.

## 2023-06-21 NOTE — PROGRESS NOTES
"[facility] Progress Note  Patient Name: Carine Darden MRN: [id number]   Patient Class: IP- Inpatient  Length of Stay: 8   Admission Date: 6/12/2023 11:06 AM Attending Physician: Jasmeet Balbuena MD   Primary Care Provider: Inocente Guillermo NP Face-to-Face encounter date: 06/20/2023   Chief Complaint: Leg Pain (Pt R foot is cool to the touch. 10/10 pain stated by pt. Started 9pm last night. Pt states "numbness and tingling" and hip pain. )      Subjective:    Interval History     Pain okay controlled. Participating with PT/OT. S/p take down of wound vac.  Unfortunately, [facility] denied inpatient rehab.  Our visit was cut short by her needing to use the toilet and thus I did not get an opportunity to discuss SNF further.       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  Vascular surgery consulted.  Heparin gtt started. 6/12 right iliac percutaneous  thrombectomy and stent to right common iliac with cath placed for tpa infusion.  She was brought back to the OR with Dr. Aleman and intra operative findings show persistent right popliteal thrombosis with diffuse thrombus in the posterior tibial and peroneal arteries.   Exploration of these vessels revealed changes to suggest these may be chronic. Revascularization was not attempted given concern that it would disrupt more collaterals. Discussed with Dr. Aleman who recommends amputation. General surgery consulted and s/p right AKA 6/16. Has required blood transfusion from H/H trending down likely from oozing from lower extremity angiogram incision sites.     Review of Systems   All other Review of Systems were found to be negative expect for that mentioned already in HPI.     Hospital Course     06/20/2023     docusate sodium  100 mg Oral BID    pantoprazole  40 mg " "Oral Daily    pregabalin  150 mg Oral BID       sodium chloride, acetaminophen, acetaminophen, aluminum-magnesium hydroxide-simethicone, dextrose 50%, dextrose 50%, glucagon (human recombinant), glucose, glucose, HYDROmorphone, magnesium sulfate IVPB, magnesium sulfate IVPB, melatonin, naloxone, ondansetron, oxyCODONE, potassium bicarbonate, potassium bicarbonate, potassium bicarbonate, prochlorperazine, simethicone, sodium chloride 0.9%      Objective:   Physical Exam  BP (!) 95/54   Pulse 101   Temp 98.2 °F (36.8 °C) (Oral)   Resp 17   Ht 5' 2" (1.575 m)   Wt 96.3 kg (212 lb 4.9 oz)   SpO2 95%   BMI 38.83 kg/m²   Constitutional: No distress.   HENT: Atraumatic.   Cardiovascular: Normal rate, regular rhythm and normal heart sounds.   Pulmonary/Chest: Effort normal. Clear to auscultation bilaterally. No wheezes.   Abdominal: Soft. Bowel sounds are normal. Exhibits no distension and no mass. No tenderness  Neurological: Alert.   Skin: Skin is warm and dry.   Right AKA    Labs and Imaging    Significant Labs: All pertinent labs within the past 24 hours have been reviewed.    Significant Imaging: I have reviewed all pertinent imaging results/findings within the past 24 hours.    I have reviewed the Vitals, labs and imaging as above.     Assessment & Plan:   Carine Darden is a 57 y.o. female admitted for    Active Hospital Problems    Diagnosis    *Critical limb ischemia of right lower extremity    Colostomy status    Essential (primary) hypertension    Radiation colitis    Malignant neoplasm of cervix       Plan  Vascular Surgical intervention not successful. General surgery consulted and patient went right AKA 6/16.  Routine post op care. Wound vac to stump removed 6/20.  Pain control.  Medication adjusted.  Appears much more comfortable. Plan to wean off IV pain medication in next 1-1.5 days.  S/p blood transfusion.  Had been oozing from angiogram leg incisions.  Resolved.  H/H stable.  Stopping daily " labs.  Watching low grade temperature.  No overt infection detected.  Seemingly resolved.   Continuing vascular monitoring   PT/OT.  Recommending rehab.  Denied by [employer].  Medically cleared for discharge.  I will highly recommend SNF but if she declines, she will discharge home with Home Health.    Active PT: Yes  Active OT: Yes  Active SLP: No    Core measures:  - Code status:   - Diet: Cardiac  VTE Risk Mitigation (From admission, onward)           Ordered     IP VTE HIGH RISK PATIENT  Once         06/13/23 0705     Place sequential compression device  Until discontinued         06/13/23 0705     Reason for No Pharmacological VTE Prophylaxis  Once        Question:  Reasons:  Answer:  IV Heparin w/in 24 hrs. Pre or Post-Op    06/13/23 0705                    Discharge Planning:   Discharge Planning   SHONDA: 06/16    Code Status: Full Code   Is the patient medically ready for discharge?: no    Reason for patient still in hospital (select all that apply): Patient trending condition  Discharge Plan A: Home with assistance from family        Above encounter included review of the medical records, interviewing and examining the patient face-to-face, discussion with family and other health care providers, ordering and interpreting lab/test results and formulating a plan of care.     Medical Decision Making:  [] Low Complexity  [] Moderate Complexity  [x] High Complexity    Og Foley MD  [facility] Hospitalist  06/20/2023

## 2023-06-21 NOTE — CARE UPDATE
06/21/23 0855   Patient Assessment/Suction   Level of Consciousness (AVPU) alert   Respiratory Effort Unlabored   Expansion/Accessory Muscles/Retractions no use of accessory muscles   All Lung Fields Breath Sounds clear   Rhythm/Pattern, Respiratory unlabored   PRE-TX-O2   Device (Oxygen Therapy) room air   SpO2 95 %   Pulse Oximetry Type Intermittent   $ Pulse Oximetry - Single Charge Pulse Oximetry - Single   Pulse 84   Resp 18   Incentive Spirometer   $ Incentive Spirometer Charges done with encouragement   Incentive Spirometer Predicted Level (mL) 1950   Administration (IS) proper technique demonstrated   Number of Repetitions (IS) 8   Level Incentive Spirometer (mL) 1500   Patient Tolerance (IS) good

## 2023-06-21 NOTE — PLAN OF CARE
Problem: Adult Inpatient Plan of Care  Goal: Plan of Care Review  Outcome: Met  Goal: Patient-Specific Goal (Individualized)  Outcome: Met  Goal: Absence of Hospital-Acquired Illness or Injury  Outcome: Met  Goal: Optimal Comfort and Wellbeing  Outcome: Met  Goal: Readiness for Transition of Care  Outcome: Met     Problem: Fall Injury Risk  Goal: Absence of Fall and Fall-Related Injury  Outcome: Met     Problem: Infection  Goal: Absence of Infection Signs and Symptoms  Outcome: Met     Problem: Skin Injury Risk Increased  Goal: Skin Health and Integrity  Outcome: Met     Problem: Coping Ineffective  Goal: Effective Coping  Outcome: Met     Problem: Depression  Goal: Improved Mood  Outcome: Met     Problem: Adjustment to Surgery (Extremity Amputation)  Goal: Optimal Coping with Amputation  Outcome: Met     Problem: Bleeding (Extremity Amputation)  Goal: Absence of Bleeding  Outcome: Met     Problem: Bowel Motility Impaired (Extremity Amputation)  Goal: Effective Bowel Elimination  Outcome: Met     Problem: Fluid and Electrolyte Imbalance (Extremity Amputation)  Goal: Fluid and Electrolyte Balance  Outcome: Met     Problem: Functional Ability Impaired (Extremity Amputation)  Goal: Optimal Functional Ability  Outcome: Met     Problem: Infection (Extremity Amputation)  Goal: Absence of Infection Signs and Symptoms  Outcome: Met     Problem: Ongoing Anesthesia Effects (Extremity Amputation)  Goal: Anesthesia/Sedation Recovery  Outcome: Met     Problem: Pain (Extremity Amputation)  Goal: Acceptable Pain Control  Outcome: Met     Problem: Postoperative Nausea and Vomiting (Extremity Amputation)  Goal: Nausea and Vomiting Relief  Outcome: Met     Problem: Postoperative Urinary Retention (Extremity Amputation)  Goal: Effective Urinary Elimination  Outcome: Met     Problem: Residual Limb Care (Extremity Amputation)  Goal: Optimal Residual Limb Healing  Outcome: Met

## 2023-06-21 NOTE — DISCHARGE SUMMARY
[facility]  Discharge Summary      Patient Name: Carine Darden  MRN: [id number]  DALIA: [id number]  Patient Class: IP- Inpatient  Admission Date: 6/12/2023  Hospital Length of Stay: 9 days  Discharge Date and Time: No discharge date for patient encounter.  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  Vascular surgery consulted.  Heparin gtt started. 6/12 right iliac percutaneous  thrombectomy and stent to right common iliac with cath placed for tpa infusion.  She was brought back to the OR with Dr. Aleman and intra operative findings show persistent right popliteal thrombosis with diffuse thrombus in the posterior tibial and peroneal arteries.   Exploration of these vessels revealed changes to suggest these may be chronic. Revascularization was not attempted given concern that it would disrupt more collaterals. Discussed with Dr. Aleman who recommends amputation. General surgery consulted and s/p right AKA 6/16. Has required blood transfusion from H/H trending down likely from oozing from lower extremity angiogram incision sites. Pain okay controlled. Participating with PT/OT. S/p take down of wound vac.  Unfortunately, [facility] denied inpatient rehab.  She did agree to SNF. Discharge to SNF with pain medication and plan to follow up with Surgery outpatient.  Routine wound care.  Referral for prosthesis made by General Surgery.         Goals of Care Treatment Preferences:  Code Status: Full Code      Consults:   Consults (From admission, onward)         Status Ordering Provider     Inpatient consult to   Once        Provider:  (Not yet assigned)    Acknowledged SERENA BRADFORD     Inpatient consult to General Surgery  Once        Provider:  Alexandr Seay III, MD    Completed SERENA BRADFROD     Inpatient consult to Vascular Surgery  Once        Provider:  Moody Aleman MD    Completed JOSE RDZ          No new Assessment & Plan notes have been filed under this hospital service since the last note was generated.  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 Schedule an appointment as soon as possible for a visit in 3 week(s).    Specialty: General Surgery  Why: post surgical follow up  Contact information:  [address] [facility]  [address] [place] [id number]  [phone]                   Contact information for after-discharge care     Destination     [facility] .    Service: Skilled Nursing  Contact information:  [address] [address] [id number] [phone]                           Patient Instructions:      Diet Adult Regular     Notify your health care provider if you experience any of the following:  severe uncontrolled pain     Notify your health care provider if you experience any of the following:  redness, tenderness, or signs of infection (pain, swelling, redness, odor or  green/yellow discharge around incision site)     Activity as tolerated       Significant Diagnostic Studies: Labs:   CMP   Recent Labs   Lab 06/20/23  0728      K 3.8      CO2 31*   *   BUN 13   CREATININE 0.7   CALCIUM 8.3*   ANIONGAP 8    and CBC   Recent Labs   Lab 06/20/23  0728   WBC 4.64   HGB 7.5*   HCT 24.0*          Pending Diagnostic Studies:     None         Medications:  Reconciled Home Medications:      Medication List      START taking these medications    docusate sodium 100 MG capsule  Commonly known as: COLACE  Take 1 capsule (100 mg total) by mouth 2 (two) times daily.     oxyCODONE 15 MG Tab  Commonly known as: ROXICODONE  Take 1 tablet (15 mg total) by mouth every 4 (four) hours as needed for Pain.        CONTINUE taking these medications    cyclobenzaprine 10 MG tablet  Commonly known as: FLEXERIL  Take 10 mg by mouth 3 (three) times daily as needed for Muscle spasms.     nortriptyline 25 MG capsule  Commonly known as: PAMELOR  Take 50 mg by mouth every evening.     ondansetron 4 MG Tbdl  Commonly known as: ZOFRAN-ODT  Take 4 mg by mouth every 8 (eight) hours as needed.     pregabalin 75 MG capsule  Commonly known as: LYRICA  Take 75 mg by mouth 2 (two) times daily.        STOP taking these medications    oxyCODONE-acetaminophen  mg per tablet  Commonly known as: PERCOCET            Indwelling Lines/Drains at time of discharge:   Lines/Drains/Airways     Drain  Duration                Colostomy 06/13/23 0701 LLQ 8 days                Time spent on the discharge of patient: 33 minutes         Og Foley MD  Department of Hospital Medicine  [facility]

## 2023-06-21 NOTE — PT/OT/SLP PROGRESS
Occupational Therapy   Treatment    Name: Carine Darden  MRN: [id number]  Admitting Diagnosis:  Critical limb ischemia of right lower extremity  5 Days Post-Op    Recommendations:     Discharge Recommendations: rehabilitation facility  Discharge Equipment Recommendations:  bedside commode  Barriers to discharge:       Assessment:     Carine Darden is a 57 y.o. female with a medical diagnosis of Critical limb ischemia of right lower extremity.  She presents with good activity tolerance with ADL participation and BUE exercises despite c/o RLE pain. Performance deficits affecting function are weakness, impaired endurance, impaired functional mobility, gait instability, impaired balance, impaired self care skills, decreased lower extremity function, impaired skin, impaired cardiopulmonary response to activity.     Rehab Prognosis:  Good; patient would benefit from acute skilled OT services to address these deficits and reach maximum level of function.       Plan:     Patient to be seen 6 x/week to address the above listed problems via self-care/home management, therapeutic activities, therapeutic exercises, wheelchair management/training  Plan of Care Expires: 07/17/23  Plan of Care Reviewed with: patient    Subjective     Chief Complaint: RLE pain  Patient/Family Comments/goals: none  Pain/Comfort:  Pain Rating 1: 10/10  Location - Side 1: Right  Location - Orientation 1: proximal  Location 1: leg  Pain Addressed 1: Reposition, Distraction  Pain Rating Post-Intervention 1: 10/10    Objective:     Communicated with: nurse prior to session.  Patient found HOB elevated with telemetry upon OT entry to room.    General Precautions: Standard, fall    Orthopedic Precautions:RLE non weight bearing  Braces: N/A  Respiratory Status: Room air     Occupational Performance:     Bed Mobility:    Patient completed Scooting/Bridging with stand by assistance  Patient completed Supine to Sit with stand by assistance  Patient completed Sit to  Supine with stand by assistance     Functional Mobility/Transfers:  Patient completed Sit <> Stand Transfer with contact guard assistance  with  rolling walker   Patient completed Bed <> Chair Transfer using Stand Pivot technique with contact guard assistance with rolling walker  Patient completed Chair <> BSC Stand Pivot technique with contact guard assistance with rolling walker    Activities of Daily Living:  Toileting: moderate assistance with managing underwear and pants after voiding while seated on BSC. Patient was able to perform autumn-hygiene requiring Supervision on BS.       [facility] 6 Click ADL:      Treatment & Education:  Pt performed B UE resistive therapeutic exercise 2 x 10 reps each horizontal ad/abduction, shoulder flexion/extension & elbow flexion/extension with rest breaks taken between sets. Pt able to perform all exercises after demonstration provided.     Patient left HOB elevated with all lines intact, call button in reach, and bed alarm on    GOALS:   Multidisciplinary Problems       Occupational Therapy Goals          Problem: Occupational Therapy    Goal Priority Disciplines Outcome Interventions   Occupational Therapy Goal     OT, PT/OT Ongoing, Progressing    Description: Goals to be met by: 7/10/2023     Patient will increase functional independence with ADLs by performing:    LE Dressing with Modified [place].  Toileting from bedside commode with Modified [place] for hygiene and clothing management.   Toilet transfer to bedside commode with Modified [place].                         Time Tracking:     OT Date of Treatment: 06/21/23  OT Start Time: 1026  OT Stop Time: 1105  OT Total Time (min): 39 min    Billable Minutes:Self Care/Home Management 23  Therapeutic Exercise 16    OT/ERMA: OT          6/21/2023

## 2023-06-21 NOTE — PLAN OF CARE
"   06/21/23 1152   Final Note   Assessment Type Final Discharge Note   Anticipated Discharge Disposition SNF   What phone number can be called within the next 1-3 days to see how you are doing after discharge? [phone]   Hospital Resources/Appts/Education Provided Post-Acute resouces added to AVS   Post-Acute Status   Post-Acute Authorization Placement   Post-Acute Placement Status Set-up Complete/Auth obtained   Discharge Delays (!) Ambulance Transport/Facility Transport     Discharge orders and chart reviewed. No other discharge needs noted at this time. Pt is clear for discharge from case management. Pt is discharging to [facility].    Per Katherine, "Bed Assignment:  Please call report to [facility]  [phone]  " Katherine to arrange transport after report is called.      1301 - Per Katherine, transport scheduled for 1700. PPD ordered   "

## 2023-06-21 NOTE — PLAN OF CARE
06/21/23 0846   Discharge Reassessment   Assessment Type Discharge Planning Reassessment   Did the patient's condition or plan change since previous assessment? Yes   Discharge Plan discussed with: Patient   Communicated SHONDA with patient/caregiver Yes   Discharge Plan A Home with family;Home Health   Discharge Plan B Skilled Nursing Facility   Why the patient remains in the hospital Requires continued medical care   Post-Acute Status   Post-Acute Authorization Placement;Home Health   Post-Acute Placement Status Pending payor review/awaiting authorization (if required)   Discharge Delays (!) Post-Acute Set-up     Per Katherine, [facility] authorization submitted on yesterday. Still pending at this time.     Case management to meet with patient this AM to discuss SNF placement at [facility] vs home with family and home health.     1142 - Patient agreeable to [facility] on hospital discharge. Per Katherine, authorization obtained. Case management requested discharge orders from Dr. Foley.

## 2023-06-22 PROBLEM — D64.9 ANEMIA: Status: ACTIVE | Noted: 2023-06-22

## 2023-06-22 PROBLEM — Z75.8 DISCHARGE PLANNING ISSUES: Status: ACTIVE | Noted: 2023-06-22

## 2023-06-22 PROBLEM — Z02.9 DISCHARGE PLANNING ISSUES: Status: ACTIVE | Noted: 2023-06-22

## 2023-06-22 PROBLEM — Z89.611 S/P AKA (ABOVE KNEE AMPUTATION), RIGHT: Status: ACTIVE | Noted: 2023-06-22

## 2023-06-22 PROBLEM — L03.115 CELLULITIS OF RIGHT THIGH: Status: ACTIVE | Noted: 2023-06-22

## 2023-06-26 PROBLEM — F32.9 REACTIVE DEPRESSION: Status: ACTIVE | Noted: 2023-06-26

## 2023-06-26 PROBLEM — D51.9 ANEMIA DUE TO VITAMIN B12 DEFICIENCY: Status: ACTIVE | Noted: 2023-06-22

## 2023-06-30 ENCOUNTER — OFFICE VISIT (OUTPATIENT)
Dept: SURGERY | Facility: CLINIC | Age: 58
End: 2023-06-30
Payer: MEDICARE

## 2023-06-30 VITALS — SYSTOLIC BLOOD PRESSURE: 97 MMHG | TEMPERATURE: 98 F | HEART RATE: 73 BPM | DIASTOLIC BLOOD PRESSURE: 65 MMHG

## 2023-06-30 DIAGNOSIS — S78.111A UNILATERAL AKA, RIGHT: Primary | ICD-10-CM

## 2023-06-30 PROBLEM — G54.6 PHANTOM PAIN AFTER AMPUTATION OF LOWER EXTREMITY: Status: ACTIVE | Noted: 2023-06-30

## 2023-06-30 PROCEDURE — 99024 POSTOP FOLLOW-UP VISIT: CPT | Mod: S$GLB,,, | Performed by: SURGERY

## 2023-06-30 PROCEDURE — 3078F DIAST BP <80 MM HG: CPT | Mod: CPTII,S$GLB,, | Performed by: SURGERY

## 2023-06-30 PROCEDURE — 3078F PR MOST RECENT DIASTOLIC BLOOD PRESSURE < 80 MM HG: ICD-10-PCS | Mod: CPTII,S$GLB,, | Performed by: SURGERY

## 2023-06-30 PROCEDURE — 3044F HG A1C LEVEL LT 7.0%: CPT | Mod: CPTII,S$GLB,, | Performed by: SURGERY

## 2023-06-30 PROCEDURE — 3074F PR MOST RECENT SYSTOLIC BLOOD PRESSURE < 130 MM HG: ICD-10-PCS | Mod: CPTII,S$GLB,, | Performed by: SURGERY

## 2023-06-30 PROCEDURE — 99024 PR POST-OP FOLLOW-UP VISIT: ICD-10-PCS | Mod: S$GLB,,, | Performed by: SURGERY

## 2023-06-30 PROCEDURE — 3074F SYST BP LT 130 MM HG: CPT | Mod: CPTII,S$GLB,, | Performed by: SURGERY

## 2023-06-30 PROCEDURE — 3044F PR MOST RECENT HEMOGLOBIN A1C LEVEL <7.0%: ICD-10-PCS | Mod: CPTII,S$GLB,, | Performed by: SURGERY

## 2023-06-30 NOTE — PROGRESS NOTES
GENERAL SURGERY  POST-OP PROGRESS NOTE    HPI: Carine Darden is a 57 y.o. female status post right above-knee amputation on 06/16/2023 after failed embolectomy and bypass of the right ower leg. Patient was discharged to rehab facility.  Here today for follow-up and wound check. Reports she is progressing well with physical therapy.  Has not been evaluated by prosthesis company. Hopefully will be discharged with home health and home physical therapy. Has mild drainage along the right medial knee incision site at the location of the previous embolectomy and bypass incision. AKA incision appears to be healing well without any drainage. No significant phantom pain.    VITALS:  Vitals:    06/30/23 1032   BP: 97/65   Pulse: 73   Temp: 98.4 °F (36.9 °C)       PHYSICAL EXAM:  Right lower leg with medial incision extending down connecting to the right AKA incision closed with staples. The distal aspect of the medial thigh incision site has superficial ischemia with mild drainage but no full-thickness wound and no sign of infection.  The AKA stump is well healed without any fluid collection or sign of infection.              PATHOLOGY:  ABOVE THE KNEE AMPUTATION OF THE RIGHT LEG:   - SEVERE ATHEROSCLEROSIS WITH THROMBOSIS AND OCCLUSION.   - SURROUNDING SOFT TISSUE WITH SEVERE ACUTE INFLAMMATION AND FOCALLY    NECROTIC/INFARCTED SKELETAL MUSCLE.     ASSESSMENT & PLAN:  57 y.o. female s/p right AKA  -AKA stump well-healed, staples removed  -medial thigh incision site with mild superficial ischemia but mostly intact, staples removed  -okay to shower using soap and water directly over the lesion and pat dry  -located bandage as needed especially along superficial necrosis sites but not mandatory  -okay to use compression sleeve once evaluated by prosthesis [employer]  -okay from surgical standpoint for discharge from rehab facility to home as long as home health and home PT is available  -return to clinic in 6 weeks

## 2023-07-31 ENCOUNTER — TELEPHONE (OUTPATIENT)
Dept: HEMATOLOGY/ONCOLOGY | Facility: CLINIC | Age: 58
End: 2023-07-31

## 2023-07-31 NOTE — TELEPHONE ENCOUNTER
----- Message from Dyana Smith sent at 7/31/2023 10:21 AM CDT -----  Pt is calling to make an appt with Dr. Melissa. She was given our office number to call and make an appt.  She was at [facility] for PE. She will call to see if she can get referral.       [phone]

## 2023-08-09 ENCOUNTER — PATIENT MESSAGE (OUTPATIENT)
Dept: SURGERY | Facility: CLINIC | Age: 58
End: 2023-08-09

## 2023-08-09 ENCOUNTER — OFFICE VISIT (OUTPATIENT)
Dept: SURGERY | Facility: CLINIC | Age: 58
End: 2023-08-09
Payer: MEDICARE

## 2023-08-09 VITALS
WEIGHT: 190.25 LBS | SYSTOLIC BLOOD PRESSURE: 126 MMHG | TEMPERATURE: 99 F | HEIGHT: 62 IN | BODY MASS INDEX: 35.01 KG/M2 | RESPIRATION RATE: 16 BRPM | DIASTOLIC BLOOD PRESSURE: 84 MMHG | HEART RATE: 76 BPM

## 2023-08-09 DIAGNOSIS — S78.111A UNILATERAL AKA, RIGHT: Primary | ICD-10-CM

## 2023-08-09 PROCEDURE — 3074F SYST BP LT 130 MM HG: CPT | Mod: CPTII,S$GLB,, | Performed by: SURGERY

## 2023-08-09 PROCEDURE — 3044F PR MOST RECENT HEMOGLOBIN A1C LEVEL <7.0%: ICD-10-PCS | Mod: CPTII,S$GLB,, | Performed by: SURGERY

## 2023-08-09 PROCEDURE — 3044F HG A1C LEVEL LT 7.0%: CPT | Mod: CPTII,S$GLB,, | Performed by: SURGERY

## 2023-08-09 PROCEDURE — 99024 PR POST-OP FOLLOW-UP VISIT: ICD-10-PCS | Mod: S$GLB,,, | Performed by: SURGERY

## 2023-08-09 PROCEDURE — 3079F DIAST BP 80-89 MM HG: CPT | Mod: CPTII,S$GLB,, | Performed by: SURGERY

## 2023-08-09 PROCEDURE — 3008F BODY MASS INDEX DOCD: CPT | Mod: CPTII,S$GLB,, | Performed by: SURGERY

## 2023-08-09 PROCEDURE — 3008F PR BODY MASS INDEX (BMI) DOCUMENTED: ICD-10-PCS | Mod: CPTII,S$GLB,, | Performed by: SURGERY

## 2023-08-09 PROCEDURE — 3079F PR MOST RECENT DIASTOLIC BLOOD PRESSURE 80-89 MM HG: ICD-10-PCS | Mod: CPTII,S$GLB,, | Performed by: SURGERY

## 2023-08-09 PROCEDURE — 99024 POSTOP FOLLOW-UP VISIT: CPT | Mod: S$GLB,,, | Performed by: SURGERY

## 2023-08-09 PROCEDURE — 3074F PR MOST RECENT SYSTOLIC BLOOD PRESSURE < 130 MM HG: ICD-10-PCS | Mod: CPTII,S$GLB,, | Performed by: SURGERY

## 2023-08-09 PROCEDURE — 1159F MED LIST DOCD IN RCRD: CPT | Mod: CPTII,S$GLB,, | Performed by: SURGERY

## 2023-08-09 PROCEDURE — 1159F PR MEDICATION LIST DOCUMENTED IN MEDICAL RECORD: ICD-10-PCS | Mod: CPTII,S$GLB,, | Performed by: SURGERY

## 2023-08-09 NOTE — PROGRESS NOTES
GENERAL SURGERY  POST-OP PROGRESS NOTE    HPI: Carine Darden is a 57 y.o. female status post right above-knee amputation on 06/16/2023 after failed embolectomy and bypass of the right ower leg. At initial postop visit wound was healing well and staples removed. Patient was subsequently admitted discharge from rehab.  No significant issues with her healing. Has some phantom pain but is manageable and recently her Lyrica has been increased. Has not been evaluated by prosthesis company.     VITALS:  Vitals:    08/09/23 0938   BP: 126/84   Pulse: 76   Resp: 16   Temp: 98.6 °F (37 °C)       PHYSICAL EXAM:  Right lower leg with medial incision extending down and connecting to the right AKA incision. Wounds are all well approximated and healed without signs of ischemia or breakdown.  No obvious underlying fluid collection       ASSESSMENT & PLAN:  57 y.o. female s/p right AKA  -wound is now completely healed  -no evidence of ischemia or compromise  -will send referral for prosthesis company  -return to clinic p.r.n.    ADDENDUM:  After discussion with patient and prosthesis provider we then determined the patient has no comorbidities that would keep her from using a prosthesis. She was motivated to walk again and her limits adequately healed and ready to proceed with fitting of prosthesis. Her current functional level is K 2 but we expect progression to a K 3 with a prosthesis and gait training. Prior to the amputation the patient was active and frequently on her feet.  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  She would like to returned to work and micro processor technology would allow her to return to these activities with decrease risk of falling as well as using less energy. This would decrease stress on her sound side. Her ability to perform these activities would be lessened by lesser technology. We feel she was a good candidate for K 3 P  we feel this would greatly benefit her overall health and quality of life.

## 2023-08-22 ENCOUNTER — TELEPHONE (OUTPATIENT)
Dept: SURGERY | Facility: CLINIC | Age: 58
End: 2023-08-22
Payer: MEDICARE

## 2023-08-22 DIAGNOSIS — Z89.611 S/P AKA (ABOVE KNEE AMPUTATION), RIGHT: Primary | ICD-10-CM

## 2023-08-22 NOTE — TELEPHONE ENCOUNTER
----- Message from Jennifer Hoffman MA sent at 8/22/2023  8:58 AM CDT -----  Type: Needs Medical Advice  Who Called:  pt  Symptoms (please be specific):  nelson  How long has patient had these symptoms:  neslon  Pharmacy name and phone #:  [facility]  Best Call Back Number: [phone]   Additional Information: no one has contacted pt in regards to her prosthetic leg please advise provider informed pt to reach pout if she doesn't hear anything she last seen him on the 9th

## 2023-08-24 ENCOUNTER — TELEPHONE (OUTPATIENT)
Dept: SURGERY | Facility: CLINIC | Age: 58
End: 2023-08-24
Payer: MEDICARE

## 2023-08-24 NOTE — TELEPHONE ENCOUNTER
----- Message from Franchesca Grace sent at 8/24/2023 10:23 AM CDT -----  Contact: Patient  Type:  Patient Returning Call    Who Called:  Pt  Who Left Message for Patient:  Yolanda Gordon  Does the patient know what this is regarding?:  getting fitted for her prosthetic  Best Call Back Number:  [phone]  Additional Information:  Please call the patient back at the phone number listed above to advise. Thank you!

## 2023-09-01 ENCOUNTER — OFFICE VISIT (OUTPATIENT)
Dept: HEMATOLOGY/ONCOLOGY | Facility: CLINIC | Age: 58
End: 2023-09-01
Payer: MEDICARE

## 2023-09-01 VITALS
RESPIRATION RATE: 18 BRPM | DIASTOLIC BLOOD PRESSURE: 69 MMHG | TEMPERATURE: 98 F | WEIGHT: 187 LBS | SYSTOLIC BLOOD PRESSURE: 113 MMHG | BODY MASS INDEX: 34.41 KG/M2 | HEART RATE: 80 BPM | HEIGHT: 62 IN

## 2023-09-01 DIAGNOSIS — D68.59 HYPERCOAGULATION SYNDROME: ICD-10-CM

## 2023-09-01 DIAGNOSIS — D51.9 ANEMIA DUE TO VITAMIN B12 DEFICIENCY, UNSPECIFIED B12 DEFICIENCY TYPE: Primary | ICD-10-CM

## 2023-09-01 DIAGNOSIS — I26.99 ACUTE PULMONARY EMBOLISM, UNSPECIFIED PULMONARY EMBOLISM TYPE, UNSPECIFIED WHETHER ACUTE COR PULMONALE PRESENT: ICD-10-CM

## 2023-09-01 DIAGNOSIS — I74.3 ARTERIAL EMBOLUS AND THROMBOSIS OF LOWER EXTREMITY: ICD-10-CM

## 2023-09-01 PROCEDURE — 3044F HG A1C LEVEL LT 7.0%: CPT | Mod: CPTII,S$GLB,, | Performed by: INTERNAL MEDICINE

## 2023-09-01 PROCEDURE — 99204 OFFICE O/P NEW MOD 45 MIN: CPT | Mod: S$GLB,,, | Performed by: INTERNAL MEDICINE

## 2023-09-01 PROCEDURE — 1159F PR MEDICATION LIST DOCUMENTED IN MEDICAL RECORD: ICD-10-PCS | Mod: CPTII,S$GLB,, | Performed by: INTERNAL MEDICINE

## 2023-09-01 PROCEDURE — 3008F BODY MASS INDEX DOCD: CPT | Mod: CPTII,S$GLB,, | Performed by: INTERNAL MEDICINE

## 2023-09-01 PROCEDURE — 3078F DIAST BP <80 MM HG: CPT | Mod: CPTII,S$GLB,, | Performed by: INTERNAL MEDICINE

## 2023-09-01 PROCEDURE — 1159F MED LIST DOCD IN RCRD: CPT | Mod: CPTII,S$GLB,, | Performed by: INTERNAL MEDICINE

## 2023-09-01 PROCEDURE — 3044F PR MOST RECENT HEMOGLOBIN A1C LEVEL <7.0%: ICD-10-PCS | Mod: CPTII,S$GLB,, | Performed by: INTERNAL MEDICINE

## 2023-09-01 PROCEDURE — 3078F PR MOST RECENT DIASTOLIC BLOOD PRESSURE < 80 MM HG: ICD-10-PCS | Mod: CPTII,S$GLB,, | Performed by: INTERNAL MEDICINE

## 2023-09-01 PROCEDURE — 3074F SYST BP LT 130 MM HG: CPT | Mod: CPTII,S$GLB,, | Performed by: INTERNAL MEDICINE

## 2023-09-01 PROCEDURE — 3008F PR BODY MASS INDEX (BMI) DOCUMENTED: ICD-10-PCS | Mod: CPTII,S$GLB,, | Performed by: INTERNAL MEDICINE

## 2023-09-01 PROCEDURE — 99204 PR OFFICE/OUTPT VISIT, NEW, LEVL IV, 45-59 MIN: ICD-10-PCS | Mod: S$GLB,,, | Performed by: INTERNAL MEDICINE

## 2023-09-01 PROCEDURE — 3074F PR MOST RECENT SYSTOLIC BLOOD PRESSURE < 130 MM HG: ICD-10-PCS | Mod: CPTII,S$GLB,, | Performed by: INTERNAL MEDICINE

## 2023-09-01 NOTE — PROGRESS NOTES
[facility] Hematology Oncology In Office Initial Encounter Note    9/1/23      Subjective:      Patient ID:   Carine Darden  57 y.o. female  1965  [place], [place]      Chief Complaint:   Arterial clots    HPI:  57 y.o. female with history of cervical cancer 3 years ago, treated with external and internal radiation.  Chemotherapy was given concurrently x2 at [facility] in [facility].  6/3/20.    She had complications with the radiation affecting her small bowel and colon.  She had bowel issues with ischemia x8 months and eventually became septic and had to have part of her small bowel and colon removed at [facility].  She has a ostomy present and was due to have reversal surgery around August 8, 2023.      However she developed right leg arterial thrombi with a cold foot.  Attempts were made to try and salvage the leg without success and she had a right above the knee amputation on June 16, 2023 per Mayte Gamez and Ash.  She was in rehab around July 2, 2023 and on return home she developed shortness of breath around August 3, 2023 with pulmonary embolus.    She is status post cervical spine surgery x1, fracture of tibia and fibula in her 20s with right leg surgery, status post left knee replacement, when she is due for right hip surgery, pending.    She is allergic to latex and natural rubber.  She smokes 1/2 pack per day for 48 years, she does more referral to the smoking cessation clinic.  She does not drink alcohol with regularity, she is a food truck worker.      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    Medicines include Ecotrin, Tums, B12, Colace, Lexapro, Pamalor, Zofran, Lyrica, and Eliquis 5 mg b.i.d..    Her brother had cancer of  unknown type.  She has a son and daughter alive and well.  She does not have family history of DVT, PE, or miscarriages.            ROS:   GEN: normal without any fever, night sweats or weight loss  HEENT: See HPI  CV: normal with no CP, SOB, PND, BENÍTEZ or orthopnea  PULM: normal with no SOB, cough, hemoptysis, sputum or pleuritic pain  GI: See HPI  : normal with no hematuria, dysuria  GYN:See HPI  BREAST: normal with no mass, discharge, pain  SKIN: normal with no rash, erythema, bruising, or swelling     Past Medical History:   Diagnosis Date    Cervical cancer      Past Surgical History:   Procedure Laterality Date    ABOVE-KNEE AMPUTATION Right 6/16/2023    Procedure: AMPUTATION, ABOVE KNEE;  Surgeon: Gagan Gamez Jr., MD;  Location: [facility] OR;  Service: General;  Laterality: Right;  gray, dusky leg, incision line noted, when dressings removed from upper as well as lower  leg    ANGIOGRAM, ABDOMINAL AORTA W/ EXTREMITY RUNOFF Right 6/12/2023    Procedure: Angiogram, Abdominal Aorta W/ Extremity Runoff;  Surgeon: Moody Aleman MD;  Location: [facility] CATH/EP LAB;  Service: Vascular;  Laterality: Right;    ANGIOGRAPHY OF LOWER EXTREMITY N/A 6/13/2023    Procedure: Angiogram Extremity Unilateral;  Surgeon: Moody Aleman MD;  Location: [facility] CATH/EP LAB;  Service: General;  Laterality: N/A;    CREATION, BYPASS, ARTERIAL, FEMORAL TO POPLITEAL, USING IN-SITU VEIN GRAFT Right 6/13/2023    Procedure: CREATION, BYPASS, ARTERIAL, FEMORAL TO POPLITEAL, USING IN-SITU VEIN GRAFT;  Surgeon: Moody Aleman MD;  Location: [facility] OR;  Service: Vascular;  Laterality: Right;    EMBOLECTOMY OR THROMBECTOMY, BLOOD VESSEL, LOWER EXTREMITY Right 6/12/2023    Procedure: EMBOLECTOMY OR THROMBECTOMY, BLOOD VESSEL, LOWER EXTREMITY;  Surgeon: Moody Aleman MD;  Location: [facility] CATH/EP LAB;  Service: Vascular;  Laterality: Right;    EMBOLECTOMY, LOWER EXTREMITY Right 6/13/2023    Procedure: EMBOLECTOMY, LOWER EXTREMITY;  Surgeon:  Moody Aleman MD;  Location: [facility] OR;  Service: Vascular;  Laterality: Right;    PTA, ILIAC ARTERY Right 6/12/2023    Procedure: PTA, Iliac Artery;  Surgeon: Moody Aleman MD;  Location: [facility] CATH/EP LAB;  Service: Vascular;  Laterality: Right;    STENT, ILIAC ARTERY Right 6/12/2023    Procedure: Stent, Iliac Artery;  Surgeon: Moody Aleman MD;  Location: [facility] CATH/EP LAB;  Service: Vascular;  Laterality: Right;    THROMBOLYSIS OR THROMBECTOMY, BLOOD VESSEL, LOWER EXTREMITY Right 6/12/2023    Procedure: THROMBOLYSIS OR THROMBECTOMY, BLOOD VESSEL, LOWER EXTREMITY;  Surgeon: Moody Aleman MD;  Location: [facility] CATH/EP LAB;  Service: Vascular;  Laterality: Right;       Review of patient's allergies indicates:   Allergen Reactions    Latex, natural rubber      Social History     Socioeconomic History    Marital status:    Tobacco Use    Smoking status: Every Day     Current packs/day: 0.50     Average packs/day: 0.5 packs/day for 48.1 years (24.0 ttl pk-yrs)     Types: Cigarettes     Start date: 8/1/1975    Smokeless tobacco: Never     Social Determinants of Health     Financial Resource Strain: Low Risk  (6/22/2023)    Overall Financial Resource Strain (CARDIA)     Difficulty of Paying Living Expenses: Not hard at all   Food Insecurity: No Food Insecurity (6/22/2023)    Hunger Vital Sign     Worried About Running Out of Food in the Last Year: Never true     Ran Out of Food in the Last Year: Never true   Transportation Needs: No Transportation Needs (6/22/2023)    PRAPARE - Transportation     Lack of Transportation (Medical): No     Lack of Transportation (Non-Medical): No   Physical Activity: Inactive (6/22/2023)    Exercise Vital Sign     Days of Exercise per Week: 0 days     Minutes of Exercise per Session: 0 min   Stress: No Stress Concern Present (6/22/2023)    [employer] [place] [employer] - Occupational Stress Questionnaire     Feeling of Stress : Not at all   Social Connections:  "Moderately Isolated (6/22/2023)    Social Connection and Isolation Panel [NHANES]     Frequency of Communication with Friends and Family: More than three times a week     Frequency of Social Gatherings with Friends and Family: More than three times a week     Attends Mu-ism Services: Never     Active Member of Clubs or Organizations: No     Attends Club or Organization Meetings: Never     Marital Status:    Housing Stability: Low Risk  (6/22/2023)    Housing Stability Vital Sign     Unable to Pay for Housing in the Last Year: No     Number of Places Lived in the Last Year: 1     Unstable Housing in the Last Year: No         Current Outpatient Medications:     aspirin (ECOTRIN) 81 MG EC tablet, Take 1 tablet (81 mg total) by mouth once daily., Disp: 30 tablet, Rfl: 0    calcium carbonate (TUMS) 200 mg calcium (500 mg) chewable tablet, Take 1 tablet (500 mg total) by mouth 2 (two) times daily as needed., Disp: 60 tablet, Rfl: 0    cyanocobalamin (VITAMIN B-12) 1000 MCG tablet, Take 1 tablet (1,000 mcg total) by mouth once daily., Disp: 30 tablet, Rfl: 0    cyclobenzaprine (FLEXERIL) 10 MG tablet, Take 1 tablet (10 mg total) by mouth 3 (three) times daily as needed for Muscle spasms., Disp: 30 tablet, Rfl: 0    docusate sodium (COLACE) 100 MG capsule, Take 1 capsule (100 mg total) by mouth 2 (two) times daily., Disp: , Rfl: 0    EScitalopram oxalate (LEXAPRO) 10 MG tablet, Take 1 tablet (10 mg total) by mouth once daily., Disp: 30 tablet, Rfl: 11    nortriptyline (PAMELOR) 25 MG capsule, Take 2 capsules (50 mg total) by mouth every evening., Disp: 30 capsule, Rfl: 0    ondansetron (ZOFRAN-ODT) 4 MG TbDL, Take 1 tablet (4 mg total) by mouth every 8 (eight) hours as needed., Disp: 20 tablet, Rfl: 0    ostomy supplies 1 1/2 " Misc, 1 each by FEEDING TUBE route., Disp: , Rfl:     pregabalin (LYRICA) 75 MG capsule, Take 1 capsule (75 mg total) by mouth 2 (two) times daily., Disp: 14 capsule, Rfl: " "0          Objective:   Vitals:  Blood pressure 113/69, pulse 80, temperature 97.5 °F (36.4 °C), resp. rate 18, height 5' 2" (1.575 m), weight 84.8 kg (187 lb).    Physical Examination:   GEN: no apparent distress, comfortable  HEAD: non traumatic and normocephalic  EYES: no pallor, no icterus  ENT:   no pharyngeal erythema, external ears WNL; no nasal discharge  NECK: no masses, thyroid normal, trachea midline, no LAD/LN's, supple  CV: RRR with no murmur; normal pulse; normal S1 and S2; no pedal edema  CHEST: Normal respiratory effort; CTAB; normal breath sounds; no wheeze or crackles  ABDOM: nontender and nondistended; soft; no rebound/guarding, L/SNP  MUSC/Skeletal:R AKA noted, site healed, closed.  L leg NT with out edema or calf tenderness.  EXTREM: no clubbing, cyanosis, inflammation or swelling  SKIN: no rashes, lesions, ulcers, petechiae   : no cvat  NEURO: grossly intact; motor/sensory WNL;  no tremors  PSYCH: normal mood, affect and behavior  LYMPH: normal cervical, supraclavicular, axillary and groin LN's      Labs:   Lab Results   Component Value Date    WBC 5.06 06/26/2023    HGB 8.6 (L) 06/26/2023    HCT 28.4 (L) 06/26/2023    MCV 93 06/26/2023     06/26/2023    CMP  Sodium   Date Value Ref Range Status   06/26/2023 140 136 - 145 mmol/L Final     Potassium   Date Value Ref Range Status   06/26/2023 3.9 3.5 - 5.1 mmol/L Final     Chloride   Date Value Ref Range Status   06/26/2023 102 95 - 110 mmol/L Final     CO2   Date Value Ref Range Status   06/26/2023 27 23 - 29 mmol/L Final     Glucose   Date Value Ref Range Status   06/26/2023 112 (H) 70 - 110 mg/dL Final     BUN   Date Value Ref Range Status   06/26/2023 19 6 - 20 mg/dL Final     Creatinine   Date Value Ref Range Status   06/26/2023 0.8 0.5 - 1.4 mg/dL Final     Calcium   Date Value Ref Range Status   06/26/2023 8.9 8.7 - 10.5 mg/dL Final     Total Protein   Date Value Ref Range Status   06/26/2023 6.3 6.0 - 8.4 g/dL Final     Albumin "   Date Value Ref Range Status   06/26/2023 2.8 (L) 3.5 - 5.2 g/dL Final     Total Bilirubin   Date Value Ref Range Status   06/26/2023 0.2 0.1 - 1.0 mg/dL Final     Comment:     For infants and newborns, interpretation of results should be based  on gestational age, weight and in agreement with clinical  observations.    Premature Infant recommended reference ranges:  Up to 24 hours.............<8.0 mg/dL  Up to 48 hours............<12.0 mg/dL  3-5 days..................<15.0 mg/dL  6-29 days.................<15.0 mg/dL       Alkaline Phosphatase   Date Value Ref Range Status   06/26/2023 75 55 - 135 U/L Final     AST   Date Value Ref Range Status   06/26/2023 10 10 - 40 U/L Final     ALT   Date Value Ref Range Status   06/26/2023 12 10 - 44 U/L Final     Anion Gap   Date Value Ref Range Status   06/26/2023 11 8 - 16 mmol/L Final         Assessment:   (1) 57 y.o. female with diagnosis of cervical cancer stage 4 dx, treated with chemo x's 2 and external and internal Rad Rx.  6/3/20.    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  She has an ostomy, needs reversal.    (3)Arterial thrombi to R leg, eventual R AKA.    (4)Pulmonary Emboli, hypercoag., on eliquis 5 mg 1 BID.  D dimer had been 1000 at Dx.    (5)Anemia 2nd blood loss, B 12 low NL at 319 in past.  Hx Vitamin D deficiency.  Now Hgb 11.2, MCV 84.  WBC and plt cnt NL, NL diff.  Check B 12 and ferritin.    Call for path report R leg AKA. 6/16/23.    J Carlos Russo, Medicare Medicaid.    Lab eval, for hypercoagulation syndrome ordered.  She continues on Eliquis 5 mg 1 BID.  Called in for her.    RTC 1 month.                 Anemia due to vitamin B12 deficiency, unspecified B12 deficiency type  -     Methylenetetrahydrofol Genotyping (C677T/L3106L); Future; Expected date: 09/06/2023  -     Beta-2 Glycoprotein Abs (IgA, IgG, IgM); Future; Expected date: 09/06/2023  -     Factor 8 Assay; Future; Expected date: 09/06/2023  -     Factor 12  Assay; Future; Expected date: 09/06/2023  -     Fibrinogen; Future; Expected date: 09/06/2023  -     Lupus Anticoagulant Eval w/Reflex; Future; Expected date: 09/06/2023  -     Anticardiolipin Ab, IgG/M, Qn; Future; Expected date: 09/06/2023  -     Factor 5 leiden; Future; Expected date: 09/06/2023  -     APC Resistance; Future; Expected date: 09/06/2023  -     Homocysteine, Serum; Future; Expected date: 09/06/2023  -     Prothrombin [id number] Mutation; Future; Expected date: 09/06/2023  -     Protein C & S Activity w/ Reflex; Future; Expected date: 09/06/2023  -     Antithrombin III activity w/rfx to Antithrombin III Antigen; Future; Expected date: 09/06/2023  -     Protime-INR; Future; Expected date: 09/06/2023  -     APTT; Future; Expected date: 09/06/2023  -     CBC Auto Differential; Future; Expected date: 09/06/2023  -     Vitamin B12; Future; Expected date: 09/06/2023  -     Ferritin; Future; Expected date: 09/06/2023    Arterial embolus and thrombosis of lower extremity  -     Methylenetetrahydrofol Genotyping (C677T/D2371Z); Future; Expected date: 09/06/2023  -     Beta-2 Glycoprotein Abs (IgA, IgG, IgM); Future; Expected date: 09/06/2023  -     Factor 8 Assay; Future; Expected date: 09/06/2023  -     Factor 12 Assay; Future; Expected date: 09/06/2023  -     Fibrinogen; Future; Expected date: 09/06/2023  -     Lupus Anticoagulant Eval w/Reflex; Future; Expected date: 09/06/2023  -     Anticardiolipin Ab, IgG/M, Qn; Future; Expected date: 09/06/2023  -     Factor 5 leiden; Future; Expected date: 09/06/2023  -     APC Resistance; Future; Expected date: 09/06/2023  -     Homocysteine, Serum; Future; Expected date: 09/06/2023  -     Prothrombin C17893E Mutation; Future; Expected date: 09/06/2023  -     Protein C & S Activity w/ Reflex; Future; Expected date: 09/06/2023  -     Antithrombin III activity w/rfx to Antithrombin III Antigen; Future; Expected date: 09/06/2023  -     Protime-INR; Future; Expected date:  09/06/2023  -     APTT; Future; Expected date: 09/06/2023  -     CBC Auto Differential; Future; Expected date: 09/06/2023  -     Vitamin B12; Future; Expected date: 09/06/2023  -     Ferritin; Future; Expected date: 09/06/2023    Hypercoagulation syndrome  -     Methylenetetrahydrofol Genotyping (C677T/X7682F); Future; Expected date: 09/06/2023  -     Beta-2 Glycoprotein Abs (IgA, IgG, IgM); Future; Expected date: 09/06/2023  -     Factor 8 Assay; Future; Expected date: 09/06/2023  -     Factor 12 Assay; Future; Expected date: 09/06/2023  -     Fibrinogen; Future; Expected date: 09/06/2023  -     Lupus Anticoagulant Eval w/Reflex; Future; Expected date: 09/06/2023  -     Anticardiolipin Ab, IgG/M, Qn; Future; Expected date: 09/06/2023  -     Factor 5 leiden; Future; Expected date: 09/06/2023  -     APC Resistance; Future; Expected date: 09/06/2023  -     Homocysteine, Serum; Future; Expected date: 09/06/2023  -     Prothrombin [id number] Mutation; Future; Expected date: 09/06/2023  -     Protein C & S Activity w/ Reflex; Future; Expected date: 09/06/2023  -     Antithrombin III activity w/rfx to Antithrombin III Antigen; Future; Expected date: 09/06/2023  -     Protime-INR; Future; Expected date: 09/06/2023  -     APTT; Future; Expected date: 09/06/2023  -     CBC Auto Differential; Future; Expected date: 09/06/2023  -     Vitamin B12; Future; Expected date: 09/06/2023  -     Ferritin; Future; Expected date: 09/06/2023      Follow up in about 4 weeks (around 9/29/2023) for check blood clot status after therapy., check of blood status after therapy.    I have explained and the patient understands all of  the current recommendation(s). I have answered all of their questions to the best of my ability and to their complete satisfaction.

## 2023-09-01 NOTE — LETTER
September 4, 2023        Inocente Guillermo NP  [address] [facility]  [place] [address]             [facility] - Hematology Oncology  [address] [address]  Phone: [phone]  Fax: [phone]   Patient: Carine Darden   MR Number: [id number]   YOB: 1965   Date of Visit: 9/1/2023       Dear Dr. Guillermo:    Thank you for referring Carine Darden to me for evaluation. Below are the relevant portions of my assessment and plan of care.      Anemia due to vitamin B12 deficiency, unspecified B12 deficiency type  -     Methylenetetrahydrofol Genotyping (C677T/T7389N); Future; Expected date: 09/06/2023  -     Beta-2 Glycoprotein Abs (IgA, IgG, IgM); Future; Expected date: 09/06/2023  -     Factor 8 Assay; Future; Expected date: 09/06/2023  -     Factor 12 Assay; Future; Expected date: 09/06/2023  -     Fibrinogen; Future; Expected date: 09/06/2023  -     Lupus Anticoagulant Eval w/Reflex; Future; Expected date: 09/06/2023  -     Anticardiolipin Ab, IgG/M, Qn; Future; Expected date: 09/06/2023  -     Factor 5 leiden; Future; Expected date: 09/06/2023  -     APC Resistance; Future; Expected date: 09/06/2023  -     Homocysteine, Serum; Future; Expected date: 09/06/2023  -     Prothrombin [id number] Mutation; Future; Expected date: 09/06/2023  -     Protein C & S Activity w/ Reflex; Future; Expected date: 09/06/2023  -     Antithrombin III activity w/rfx to Antithrombin III Antigen; Future; Expected date: 09/06/2023  -     Protime-INR; Future; Expected date: 09/06/2023  -     APTT; Future; Expected date: 09/06/2023  -     CBC Auto Differential; Future; Expected date: 09/06/2023  -     Vitamin B12; Future; Expected date: 09/06/2023  -     Ferritin; Future; Expected date: 09/06/2023    Arterial embolus and thrombosis of lower extremity  -     Methylenetetrahydrofol Genotyping (C677T/M9574E); Future; Expected date: 09/06/2023  -     Beta-2 Glycoprotein Abs (IgA, IgG, IgM); Future; Expected  date: 09/06/2023  -     Factor 8 Assay; Future; Expected date: 09/06/2023  -     Factor 12 Assay; Future; Expected date: 09/06/2023  -     Fibrinogen; Future; Expected date: 09/06/2023  -     Lupus Anticoagulant Eval w/Reflex; Future; Expected date: 09/06/2023  -     Anticardiolipin Ab, IgG/M, Qn; Future; Expected date: 09/06/2023  -     Factor 5 leiden; Future; Expected date: 09/06/2023  -     APC Resistance; Future; Expected date: 09/06/2023  -     Homocysteine, Serum; Future; Expected date: 09/06/2023  -     Prothrombin [id number] Mutation; Future; Expected date: 09/06/2023  -     Protein C & S Activity w/ Reflex; Future; Expected date: 09/06/2023  -     Antithrombin III activity w/rfx to Antithrombin III Antigen; Future; Expected date: 09/06/2023  -     Protime-INR; Future; Expected date: 09/06/2023  -     APTT; Future; Expected date: 09/06/2023  -     CBC Auto Differential; Future; Expected date: 09/06/2023  -     Vitamin B12; Future; Expected date: 09/06/2023  -     Ferritin; Future; Expected date: 09/06/2023    Hypercoagulation syndrome  -     Methylenetetrahydrofol Genotyping (C677T/E3379S); Future; Expected date: 09/06/2023  -     Beta-2 Glycoprotein Abs (IgA, IgG, IgM); Future; Expected date: 09/06/2023  -     Factor 8 Assay; Future; Expected date: 09/06/2023  -     Factor 12 Assay; Future; Expected date: 09/06/2023  -     Fibrinogen; Future; Expected date: 09/06/2023  -     Lupus Anticoagulant Eval w/Reflex; Future; Expected date: 09/06/2023  -     Anticardiolipin Ab, IgG/M, Qn; Future; Expected date: 09/06/2023  -     Factor 5 leiden; Future; Expected date: 09/06/2023  -     APC Resistance; Future; Expected date: 09/06/2023  -     Homocysteine, Serum; Future; Expected date: 09/06/2023  -     Prothrombin H53651M Mutation; Future; Expected date: 09/06/2023  -     Protein C & S Activity w/ Reflex; Future; Expected date: 09/06/2023  -     Antithrombin III activity w/rfx to Antithrombin III Antigen; Future;  Expected date: 09/06/2023  -     Protime-INR; Future; Expected date: 09/06/2023  -     APTT; Future; Expected date: 09/06/2023  -     CBC Auto Differential; Future; Expected date: 09/06/2023  -     Vitamin B12; Future; Expected date: 09/06/2023  -     Ferritin; Future; Expected date: 09/06/2023      Follow up in about 4 weeks (around 9/29/2023) for check blood clot status after therapy., check of blood status after therapy.    I have explained and the patient understands all of  the current recommendation(s). I have answered all of their questions to the best of my ability and to their complete satisfaction.               If you have questions, please do not hesitate to call me. I look forward to following Carine along with you.    Sincerely,      JEAN Farr MD           CC    No Recipients

## 2023-10-06 ENCOUNTER — OFFICE VISIT (OUTPATIENT)
Dept: HEMATOLOGY/ONCOLOGY | Facility: CLINIC | Age: 58
End: 2023-10-06
Payer: MEDICARE

## 2023-10-06 VITALS
RESPIRATION RATE: 18 BRPM | SYSTOLIC BLOOD PRESSURE: 107 MMHG | HEART RATE: 105 BPM | HEIGHT: 62 IN | WEIGHT: 187 LBS | BODY MASS INDEX: 34.41 KG/M2 | TEMPERATURE: 98 F | DIASTOLIC BLOOD PRESSURE: 67 MMHG

## 2023-10-06 DIAGNOSIS — I83.893 VARICOSE VEINS OF BILATERAL LOWER EXTREMITIES WITH OTHER COMPLICATIONS: ICD-10-CM

## 2023-10-06 DIAGNOSIS — I73.9 PERIPHERAL ARTERY DISEASE: Primary | ICD-10-CM

## 2023-10-06 DIAGNOSIS — D68.59 HYPERCOAGULOPATHY: ICD-10-CM

## 2023-10-06 PROCEDURE — 3044F PR MOST RECENT HEMOGLOBIN A1C LEVEL <7.0%: ICD-10-PCS | Mod: CPTII,S$GLB,, | Performed by: INTERNAL MEDICINE

## 2023-10-06 PROCEDURE — 3074F PR MOST RECENT SYSTOLIC BLOOD PRESSURE < 130 MM HG: ICD-10-PCS | Mod: CPTII,S$GLB,, | Performed by: INTERNAL MEDICINE

## 2023-10-06 PROCEDURE — 1159F PR MEDICATION LIST DOCUMENTED IN MEDICAL RECORD: ICD-10-PCS | Mod: CPTII,S$GLB,, | Performed by: INTERNAL MEDICINE

## 2023-10-06 PROCEDURE — 3074F SYST BP LT 130 MM HG: CPT | Mod: CPTII,S$GLB,, | Performed by: INTERNAL MEDICINE

## 2023-10-06 PROCEDURE — 3008F BODY MASS INDEX DOCD: CPT | Mod: CPTII,S$GLB,, | Performed by: INTERNAL MEDICINE

## 2023-10-06 PROCEDURE — 3078F DIAST BP <80 MM HG: CPT | Mod: CPTII,S$GLB,, | Performed by: INTERNAL MEDICINE

## 2023-10-06 PROCEDURE — 3078F PR MOST RECENT DIASTOLIC BLOOD PRESSURE < 80 MM HG: ICD-10-PCS | Mod: CPTII,S$GLB,, | Performed by: INTERNAL MEDICINE

## 2023-10-06 PROCEDURE — 3008F PR BODY MASS INDEX (BMI) DOCUMENTED: ICD-10-PCS | Mod: CPTII,S$GLB,, | Performed by: INTERNAL MEDICINE

## 2023-10-06 PROCEDURE — 1159F MED LIST DOCD IN RCRD: CPT | Mod: CPTII,S$GLB,, | Performed by: INTERNAL MEDICINE

## 2023-10-06 PROCEDURE — 99214 OFFICE O/P EST MOD 30 MIN: CPT | Mod: S$GLB,,, | Performed by: INTERNAL MEDICINE

## 2023-10-06 PROCEDURE — 3044F HG A1C LEVEL LT 7.0%: CPT | Mod: CPTII,S$GLB,, | Performed by: INTERNAL MEDICINE

## 2023-10-06 PROCEDURE — 99214 PR OFFICE/OUTPT VISIT, EST, LEVL IV, 30-39 MIN: ICD-10-PCS | Mod: S$GLB,,, | Performed by: INTERNAL MEDICINE

## 2023-10-06 RX ORDER — DULOXETIN HYDROCHLORIDE 30 MG/1
30 CAPSULE, DELAYED RELEASE ORAL
COMMUNITY
Start: 2023-10-06 | End: 2024-01-04

## 2023-10-06 RX ORDER — OXYCODONE AND ACETAMINOPHEN 10; 325 MG/1; MG/1
1 TABLET ORAL EVERY 8 HOURS PRN
COMMUNITY
Start: 2023-07-10 | End: 2024-01-05

## 2023-10-06 RX ORDER — FUROSEMIDE 20 MG/1
1 TABLET ORAL DAILY
COMMUNITY
Start: 2023-07-31

## 2023-10-06 RX ORDER — ERGOCALCIFEROL 1.25 MG/1
50000 CAPSULE ORAL
COMMUNITY
Start: 2023-07-11 | End: 2024-07-10

## 2023-10-06 RX ORDER — BACLOFEN 10 MG/1
10 TABLET ORAL 3 TIMES DAILY
COMMUNITY
Start: 2023-08-08

## 2023-10-06 NOTE — PROGRESS NOTES
[facility] Hematology Oncology In Office Subsequent Encounter Note    9/6/23      Subjective:      Patient ID:   Carine Darden  58 y.o. female  1965  Vera Guillermo [place], [place]      Chief Complaint:   Arterial clots    HPI:  58 y.o. female with history of cervical cancer 3 years ago, treated with external and internal radiation.  Chemotherapy was given concurrently x2 at [facility] in [facility].  6/3/20.    She had complications with the radiation affecting her small bowel and colon.  She had bowel issues with ischemia x8 months and eventually became septic and had to have part of her small bowel and colon removed at [facility].  She has a ostomy present and was due to have reversal surgery around August 8, 2023.      However she developed right leg arterial thrombi with a cold foot.  Attempts were made to try and salvage the leg without success and she had a right above the knee amputation on June 16, 2023 per Mayte Gamez and Ash.  She was in rehab around July 2, 2023 and on return home she developed shortness of breath around August 3, 2023 with pulmonary embolus.    She is status post cervical spine surgery x1, fracture of tibia and fibula in her 20s with right leg surgery, status post left knee replacement, when she is due for right hip surgery, pending.    She is allergic to latex and natural rubber.  She smokes 1/2 pack per day for 48 years, she does more referral to the smoking cessation clinic.  She does not drink alcohol with regularity, she is a food truck worker.      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    Medicines include Ecotrin, Tums, B12, Colace, Lexapro, Pamalor, Zofran, Lyrica, and Eliquis 5 mg b.i.d..    Her brother had cancer  of unknown type.  She has a son and daughter alive and well.  She does not have family history of DVT, PE, or miscarriages.            ROS:   GEN: normal without any fever, night sweats or weight loss  HEENT: See HPI  CV: normal with no CP, SOB, PND, BENÍTEZ or orthopnea  PULM: normal with no SOB, cough, hemoptysis, sputum or pleuritic pain  GI: See HPI  : normal with no hematuria, dysuria  GYN:See HPI  BREAST: normal with no mass, discharge, pain  SKIN: normal with no rash, erythema, bruising, or swelling     Past Medical History:   Diagnosis Date    Cervical cancer      Past Surgical History:   Procedure Laterality Date    ABOVE-KNEE AMPUTATION Right 6/16/2023    Procedure: AMPUTATION, ABOVE KNEE;  Surgeon: Gagan Gamez Jr., MD;  Location: [facility];  Service: General;  Laterality: Right;  gray, dusky leg, incision line noted, when dressings removed from upper as well as lower  leg    ANGIOGRAM, ABDOMINAL AORTA W/ EXTREMITY RUNOFF Right 6/12/2023    Procedure: Angiogram, Abdominal Aorta W/ Extremity Runoff;  Surgeon: Moody Aleman MD;  Location: [facility] CATH/EP LAB;  Service: Vascular;  Laterality: Right;    ANGIOGRAPHY OF LOWER EXTREMITY N/A 6/13/2023    Procedure: Angiogram Extremity Unilateral;  Surgeon: Moody Aleman MD;  Location: [facility] CATH/EP LAB;  Service: General;  Laterality: N/A;    CREATION, BYPASS, ARTERIAL, FEMORAL TO POPLITEAL, USING IN-SITU VEIN GRAFT Right 6/13/2023    Procedure: CREATION, BYPASS, ARTERIAL, FEMORAL TO POPLITEAL, USING IN-SITU VEIN GRAFT;  Surgeon: Moody Aleman MD;  Location: [facility] OR;  Service: Vascular;  Laterality: Right;    EMBOLECTOMY OR THROMBECTOMY, BLOOD VESSEL, LOWER EXTREMITY Right 6/12/2023    Procedure: EMBOLECTOMY OR THROMBECTOMY, BLOOD VESSEL, LOWER EXTREMITY;  Surgeon: Moody Aleman MD;  Location: [facility] CATH/EP LAB;  Service: Vascular;  Laterality: Right;    EMBOLECTOMY, LOWER EXTREMITY Right 6/13/2023    Procedure: EMBOLECTOMY, LOWER EXTREMITY;  Surgeon:  Moody Aleman MD;  Location: [facility] OR;  Service: Vascular;  Laterality: Right;    PTA, ILIAC ARTERY Right 6/12/2023    Procedure: PTA, Iliac Artery;  Surgeon: Moody Aleman MD;  Location: [facility] CATH/EP LAB;  Service: Vascular;  Laterality: Right;    STENT, ILIAC ARTERY Right 6/12/2023    Procedure: Stent, Iliac Artery;  Surgeon: Moody Aleman MD;  Location: [facility] CATH/EP LAB;  Service: Vascular;  Laterality: Right;    THROMBOLYSIS OR THROMBECTOMY, BLOOD VESSEL, LOWER EXTREMITY Right 6/12/2023    Procedure: THROMBOLYSIS OR THROMBECTOMY, BLOOD VESSEL, LOWER EXTREMITY;  Surgeon: Moody Aleman MD;  Location: [facility] CATH/EP LAB;  Service: Vascular;  Laterality: Right;       Review of patient's allergies indicates:   Allergen Reactions    Latex, natural rubber      Social History     Socioeconomic History    Marital status:    Tobacco Use    Smoking status: Every Day     Current packs/day: 0.50     Average packs/day: 0.5 packs/day for 48.2 years (24.1 ttl pk-yrs)     Types: Cigarettes     Start date: 8/1/1975    Smokeless tobacco: Never     Social Determinants of Health     Financial Resource Strain: Low Risk  (6/22/2023)    Overall Financial Resource Strain (CARDIA)     Difficulty of Paying Living Expenses: Not hard at all   Food Insecurity: No Food Insecurity (6/22/2023)    Hunger Vital Sign     Worried About Running Out of Food in the Last Year: Never true     Ran Out of Food in the Last Year: Never true   Transportation Needs: No Transportation Needs (6/22/2023)    PRAPARE - Transportation     Lack of Transportation (Medical): No     Lack of Transportation (Non-Medical): No   Physical Activity: Inactive (6/22/2023)    Exercise Vital Sign     Days of Exercise per Week: 0 days     Minutes of Exercise per Session: 0 min   Stress: No Stress Concern Present (6/22/2023)    [employer] [place] [employer] - Occupational Stress Questionnaire     Feeling of Stress : Not at all   Social Connections:  Moderately Isolated (6/22/2023)    Social Connection and Isolation Panel [NHANES]     Frequency of Communication with Friends and Family: More than three times a week     Frequency of Social Gatherings with Friends and Family: More than three times a week     Attends [employer] Services: Never     Active Member of Clubs or Organizations: No     Attends Club or Organization Meetings: Never     Marital Status:    Housing Stability: Low Risk  (6/22/2023)    Housing Stability Vital Sign     Unable to Pay for Housing in the Last Year: No     Number of Places Lived in the Last Year: 1     Unstable Housing in the Last Year: No         Current Outpatient Medications:     aspirin (ECOTRIN) 81 MG EC tablet, Take 1 tablet (81 mg total) by mouth once daily., Disp: 30 tablet, Rfl: 0    baclofen (LIORESAL) 10 MG tablet, Take 10 mg by mouth 3 (three) times daily., Disp: , Rfl:     calcium carbonate (TUMS) 200 mg calcium (500 mg) chewable tablet, Take 1 tablet (500 mg total) by mouth 2 (two) times daily as needed., Disp: 60 tablet, Rfl: 0    cyanocobalamin (VITAMIN B-12) 1000 MCG tablet, Take 1 tablet (1,000 mcg total) by mouth once daily., Disp: 30 tablet, Rfl: 0    cyclobenzaprine (FLEXERIL) 10 MG tablet, Take 1 tablet (10 mg total) by mouth 3 (three) times daily as needed for Muscle spasms., Disp: 30 tablet, Rfl: 0    docusate sodium (COLACE) 100 MG capsule, Take 1 capsule (100 mg total) by mouth 2 (two) times daily., Disp: , Rfl: 0    DULoxetine (CYMBALTA) 30 MG capsule, Take 30 mg by mouth., Disp: , Rfl:     ergocalciferol (ERGOCALCIFEROL) 50,000 unit Cap, Take 50,000 Units by mouth every 7 days., Disp: , Rfl:     EScitalopram oxalate (LEXAPRO) 10 MG tablet, Take 1 tablet (10 mg total) by mouth once daily., Disp: 30 tablet, Rfl: 11    furosemide (LASIX) 20 MG tablet, Take 1 tablet by mouth once daily., Disp: , Rfl:     nortriptyline (PAMELOR) 25 MG capsule, Take 2 capsules (50 mg total) by mouth every evening., Disp: 30  "capsule, Rfl: 0    ondansetron (ZOFRAN-ODT) 4 MG TbDL, Take 1 tablet (4 mg total) by mouth every 8 (eight) hours as needed., Disp: 20 tablet, Rfl: 0    ostomy supplies 1 1/2 " Misc, 1 each by FEEDING TUBE route., Disp: , Rfl:     oxyCODONE-acetaminophen (PERCOCET)  mg per tablet, Take 1 tablet by mouth every 8 (eight) hours as needed., Disp: , Rfl:     pregabalin (LYRICA) 75 MG capsule, Take 1 capsule (75 mg total) by mouth 2 (two) times daily., Disp: 14 capsule, Rfl: 0    apixaban (ELIQUIS) 5 mg Tab, Take 1 tablet (5 mg total) by mouth 2 (two) times daily., Disp: 60 tablet, Rfl: 12          Objective:   Vitals:  Blood pressure 107/67, pulse 105, temperature 97.5 °F (36.4 °C), resp. rate 18, height 5' 2" (1.575 m), weight 84.8 kg (187 lb).    Physical Examination:   GEN: no apparent distress, comfortable  HEAD: non traumatic and normocephalic  EYES: no pallor, no icterus  ENT:   no pharyngeal erythema, external ears WNL; no nasal discharge  NECK: no masses, thyroid normal, trachea midline, no LAD/LN's, supple  CV: RRR with no murmur; normal pulse; normal S1 and S2; no pedal edema  CHEST: Normal respiratory effort; CTAB; normal breath sounds; no wheeze or crackles  ABDOM: nontender and nondistended; soft; no rebound/guarding, L/SNP  MUSC/Skeletal:R AKA noted, site healed, closed.  L leg NT with out edema or calf tenderness.  EXTREM: no clubbing, cyanosis, inflammation or swelling  SKIN: no rashes, lesions, ulcers, petechiae   : no cvat  NEURO: grossly intact; motor/sensory WNL;  no tremors  PSYCH: normal mood, affect and behavior  LYMPH: normal cervical, supraclavicular, axillary and groin LN's      Labs:   Lab Results   Component Value Date    WBC 5.06 06/26/2023    HGB 8.6 (L) 06/26/2023    HCT 28.4 (L) 06/26/2023    MCV 93 06/26/2023     06/26/2023    CMP  Sodium   Date Value Ref Range Status   06/26/2023 140 136 - 145 mmol/L Final     Potassium   Date Value Ref Range Status   06/26/2023 3.9 3.5 - " 5.1 mmol/L Final     Chloride   Date Value Ref Range Status   06/26/2023 102 95 - 110 mmol/L Final     CO2   Date Value Ref Range Status   06/26/2023 27 23 - 29 mmol/L Final     Glucose   Date Value Ref Range Status   06/26/2023 112 (H) 70 - 110 mg/dL Final     BUN   Date Value Ref Range Status   06/26/2023 19 6 - 20 mg/dL Final     Creatinine   Date Value Ref Range Status   06/26/2023 0.8 0.5 - 1.4 mg/dL Final     Calcium   Date Value Ref Range Status   06/26/2023 8.9 8.7 - 10.5 mg/dL Final     Total Protein   Date Value Ref Range Status   06/26/2023 6.3 6.0 - 8.4 g/dL Final     Albumin   Date Value Ref Range Status   06/26/2023 2.8 (L) 3.5 - 5.2 g/dL Final     Total Bilirubin   Date Value Ref Range Status   06/26/2023 0.2 0.1 - 1.0 mg/dL Final     Comment:     For infants and newborns, interpretation of results should be based  on gestational age, weight and in agreement with clinical  observations.    Premature Infant recommended reference ranges:  Up to 24 hours.............<8.0 mg/dL  Up to 48 hours............<12.0 mg/dL  3-5 days..................<15.0 mg/dL  6-29 days.................<15.0 mg/dL       Alkaline Phosphatase   Date Value Ref Range Status   06/26/2023 75 55 - 135 U/L Final     AST   Date Value Ref Range Status   06/26/2023 10 10 - 40 U/L Final     ALT   Date Value Ref Range Status   06/26/2023 12 10 - 44 U/L Final     Anion Gap   Date Value Ref Range Status   06/26/2023 11 8 - 16 mmol/L Final         Assessment:   (1) 58 y.o. female with diagnosis of cervical cancer stage 4 dx, treated with chemo x's 2 and external and internal Rad Rx.  6/3/20.    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  She has an ostomy, needs reversal.    (3)Arterial thrombi to R leg, eventual R AKA.    (4)Pulmonary Emboli, hypercoag., on eliquis 5 mg 1 BID.  D dimer had been 1000 at Dx.    (5)Anemia 2nd blood loss, B 12 low NL at 319 in past.  Hx Vitamin D deficiency.  Now Hgb 11.2, MCV 84.   WBC and plt cnt NL, NL diff.  Check B 12 and ferritin.    Call for path report R leg AKA. 6/16/23.    J Carlos Russo, Medicare Medicaid.    Lab eval, for hypercoagulation syndrome ordered.  She continues on Eliquis 5 mg 1 BID.      Hypercoag. Eval. was unremarkable, coagulation was intact.    Path report from the R amputation show almost complete arterial obstruction by atheromatous plaque dx.  Peripheral Arterial Dx.    Continue Eliquis 5 mg BID for now.  Re check CTA and L leg U/S  12/2023.  For dental extractions 12/2023.    RTC see me 12/2023.                 Peripheral artery disease  -     apixaban (ELIQUIS) 5 mg Tab; Take 1 tablet (5 mg total) by mouth 2 (two) times daily.  Dispense: 60 tablet; Refill: 12      Follow up in about 8 weeks (around 12/1/2023) for check of blood status after therapy, check blood clot status after therapy..    I have explained and the patient understands all of  the current recommendation(s). I have answered all of their questions to the best of my ability and to their complete satisfaction.

## 2023-11-27 ENCOUNTER — HOSPITAL ENCOUNTER (OUTPATIENT)
Dept: RADIOLOGY | Facility: HOSPITAL | Age: 58
Discharge: HOME | End: 2023-11-27
Attending: INTERNAL MEDICINE
Payer: MEDICARE

## 2023-11-27 DIAGNOSIS — I73.9 PERIPHERAL ARTERY DISEASE: ICD-10-CM

## 2023-11-27 DIAGNOSIS — D68.59 HYPERCOAGULOPATHY: ICD-10-CM

## 2023-11-27 DIAGNOSIS — I83.893 VARICOSE VEINS OF BILATERAL LOWER EXTREMITIES WITH OTHER COMPLICATIONS: ICD-10-CM

## 2023-11-27 PROCEDURE — 25500020 PHARM REV CODE 255: Mod: PO | Performed by: INTERNAL MEDICINE

## 2023-11-27 PROCEDURE — 93971 EXTREMITY STUDY: CPT | Mod: TC,PO,LT

## 2023-11-27 PROCEDURE — 71275 CT ANGIOGRAPHY CHEST: CPT | Mod: TC,PO

## 2023-11-27 RX ADMIN — IOHEXOL 100 ML: 350 INJECTION, SOLUTION INTRAVENOUS at 08:11

## 2023-12-05 ENCOUNTER — OFFICE VISIT (OUTPATIENT)
Dept: HEMATOLOGY/ONCOLOGY | Facility: CLINIC | Age: 58
End: 2023-12-05
Payer: MEDICARE

## 2023-12-05 VITALS
BODY MASS INDEX: 34.2 KG/M2 | SYSTOLIC BLOOD PRESSURE: 112 MMHG | HEIGHT: 62 IN | RESPIRATION RATE: 16 BRPM | TEMPERATURE: 97 F | HEART RATE: 65 BPM | DIASTOLIC BLOOD PRESSURE: 76 MMHG

## 2023-12-05 DIAGNOSIS — D51.9 ANEMIA DUE TO VITAMIN B12 DEFICIENCY, UNSPECIFIED B12 DEFICIENCY TYPE: Primary | ICD-10-CM

## 2023-12-05 DIAGNOSIS — Z89.611 S/P AKA (ABOVE KNEE AMPUTATION), RIGHT: ICD-10-CM

## 2023-12-05 PROCEDURE — 1159F MED LIST DOCD IN RCRD: CPT | Mod: CPTII,S$GLB,, | Performed by: INTERNAL MEDICINE

## 2023-12-05 PROCEDURE — 3074F SYST BP LT 130 MM HG: CPT | Mod: CPTII,S$GLB,, | Performed by: INTERNAL MEDICINE

## 2023-12-05 PROCEDURE — 3008F PR BODY MASS INDEX (BMI) DOCUMENTED: ICD-10-PCS | Mod: CPTII,S$GLB,, | Performed by: INTERNAL MEDICINE

## 2023-12-05 PROCEDURE — 3074F PR MOST RECENT SYSTOLIC BLOOD PRESSURE < 130 MM HG: ICD-10-PCS | Mod: CPTII,S$GLB,, | Performed by: INTERNAL MEDICINE

## 2023-12-05 PROCEDURE — 3008F BODY MASS INDEX DOCD: CPT | Mod: CPTII,S$GLB,, | Performed by: INTERNAL MEDICINE

## 2023-12-05 PROCEDURE — 3078F DIAST BP <80 MM HG: CPT | Mod: CPTII,S$GLB,, | Performed by: INTERNAL MEDICINE

## 2023-12-05 PROCEDURE — 3044F HG A1C LEVEL LT 7.0%: CPT | Mod: CPTII,S$GLB,, | Performed by: INTERNAL MEDICINE

## 2023-12-05 PROCEDURE — 1159F PR MEDICATION LIST DOCUMENTED IN MEDICAL RECORD: ICD-10-PCS | Mod: CPTII,S$GLB,, | Performed by: INTERNAL MEDICINE

## 2023-12-05 PROCEDURE — 3078F PR MOST RECENT DIASTOLIC BLOOD PRESSURE < 80 MM HG: ICD-10-PCS | Mod: CPTII,S$GLB,, | Performed by: INTERNAL MEDICINE

## 2023-12-05 PROCEDURE — 99214 OFFICE O/P EST MOD 30 MIN: CPT | Mod: S$GLB,,, | Performed by: INTERNAL MEDICINE

## 2023-12-05 PROCEDURE — 99214 PR OFFICE/OUTPT VISIT, EST, LEVL IV, 30-39 MIN: ICD-10-PCS | Mod: S$GLB,,, | Performed by: INTERNAL MEDICINE

## 2023-12-05 PROCEDURE — 3044F PR MOST RECENT HEMOGLOBIN A1C LEVEL <7.0%: ICD-10-PCS | Mod: CPTII,S$GLB,, | Performed by: INTERNAL MEDICINE

## 2023-12-09 ENCOUNTER — TELEPHONE (OUTPATIENT)
Dept: HEMATOLOGY/ONCOLOGY | Facility: CLINIC | Age: 58
End: 2023-12-09

## 2023-12-10 NOTE — PROGRESS NOTES
[facility] Hematology Oncology In Office Subsequent Encounter Note    12/5/23      Subjective:      Patient ID:   Carine Darden  58 y.o. female  1965  [place] Guill[place], [place]      Chief Complaint:   Arterial clots    HPI:  58 y.o. female with history of cervical cancer 3 years ago, treated with external and internal radiation.  Chemotherapy was given concurrently x2 at [facility] in [facility].  6/3/20.    She had complications with the radiation affecting her small bowel and colon.  She had bowel issues with ischemia x8 months and eventually became septic and had to have part of her small bowel and colon removed at [facility].  She has a ostomy present and was due to have reversal surgery around August 8, 2023.      However she developed right leg arterial thrombi with a cold foot.  Attempts were made to try and salvage the leg without success and she had a right above the knee amputation on June 16, 2023 per Mayte Gamez and Ash.  She was in rehab around July 2, 2023 and on return home she developed shortness of breath around August 3, 2023 with pulmonary embolus.    She is status post cervical spine surgery x1, fracture of tibia and fibula in her 20s with right leg surgery, status post left knee replacement, when she is due for right hip surgery, pending.    She is allergic to latex and natural rubber.  She smokes 1/2 pack per day for 48 years, she does more referral to the smoking cessation clinic.  She does not drink alcohol with regularity, she is a food truck worker.      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    Medicines include Ecotrin, Tums, B12, Colace, Lexapro, Pamalor, Zofran, Lyrica, and Eliquis 5 mg b.i.d..    Her brother had  cancer of unknown type.  She has a son and daughter alive and well.  She does not have family history of DVT, PE, or miscarriages.    Dental extractions needed per pt, [facility].  She is on Eliquis 5 mg 1 BID.  Need date of extractions, so we can get her off Eliquis and cover with lovenox bridge.    CTA negative for PE.  U/S of leg no DVT seen.  RTC early 2/2024.      ROS:   GEN: normal without any fever, night sweats or weight loss  HEENT: See HPI  CV: normal with no CP, SOB, PND, BENÍTEZ or orthopnea  PULM: normal with no SOB, cough, hemoptysis, sputum or pleuritic pain  GI: See HPI  : normal with no hematuria, dysuria  GYN:See HPI  BREAST: normal with no mass, discharge, pain  SKIN: normal with no rash, erythema, bruising, or swelling     Past Medical History:   Diagnosis Date    Cervical cancer      Past Surgical History:   Procedure Laterality Date    ABOVE-KNEE AMPUTATION Right 6/16/2023    Procedure: AMPUTATION, ABOVE KNEE;  Surgeon: Gagan Gamez Jr., MD;  Location: [facility] OR;  Service: General;  Laterality: Right;  gray, dusky leg, incision line noted, when dressings removed from upper as well as lower  leg    ANGIOGRAM, ABDOMINAL AORTA W/ EXTREMITY RUNOFF Right 6/12/2023    Procedure: Angiogram, Abdominal Aorta W/ Extremity Runoff;  Surgeon: Moody Aleman MD;  Location: [facility] CATH/EP LAB;  Service: Vascular;  Laterality: Right;    ANGIOGRAPHY OF LOWER EXTREMITY N/A 6/13/2023    Procedure: Angiogram Extremity Unilateral;  Surgeon: Moody Aleman MD;  Location: [facility] CATH/EP LAB;  Service: General;  Laterality: N/A;    CREATION, BYPASS, ARTERIAL, FEMORAL TO POPLITEAL, USING IN-SITU VEIN GRAFT Right 6/13/2023    Procedure: CREATION, BYPASS, ARTERIAL, FEMORAL TO POPLITEAL, USING IN-SITU VEIN GRAFT;  Surgeon: Moody Aleman MD;  Location: [facility] OR;  Service: Vascular;  Laterality: Right;    EMBOLECTOMY OR THROMBECTOMY, BLOOD VESSEL, LOWER EXTREMITY Right 6/12/2023    Procedure: EMBOLECTOMY OR  THROMBECTOMY, BLOOD VESSEL, LOWER EXTREMITY;  Surgeon: Moody Aleman MD;  Location: [facility] CATH/EP LAB;  Service: Vascular;  Laterality: Right;    EMBOLECTOMY, LOWER EXTREMITY Right 6/13/2023    Procedure: EMBOLECTOMY, LOWER EXTREMITY;  Surgeon: Moody Aleman MD;  Location: [facility] OR;  Service: Vascular;  Laterality: Right;    PTA, ILIAC ARTERY Right 6/12/2023    Procedure: PTA, Iliac Artery;  Surgeon: Moody Aleman MD;  Location: [facility] CATH/EP LAB;  Service: Vascular;  Laterality: Right;    STENT, ILIAC ARTERY Right 6/12/2023    Procedure: Stent, Iliac Artery;  Surgeon: Moody Aleman MD;  Location: [facility] CATH/EP LAB;  Service: Vascular;  Laterality: Right;    THROMBOLYSIS OR THROMBECTOMY, BLOOD VESSEL, LOWER EXTREMITY Right 6/12/2023    Procedure: THROMBOLYSIS OR THROMBECTOMY, BLOOD VESSEL, LOWER EXTREMITY;  Surgeon: Moody Aleman MD;  Location: [facility] CATH/EP LAB;  Service: Vascular;  Laterality: Right;       Review of patient's allergies indicates:   Allergen Reactions    Latex, natural rubber      Social History     Socioeconomic History    Marital status:    Tobacco Use    Smoking status: Every Day     Current packs/day: 0.50     Average packs/day: 0.5 packs/day for 48.4 years (24.2 ttl pk-yrs)     Types: Cigarettes     Start date: 8/1/1975    Smokeless tobacco: Never     Social Determinants of Health     Financial Resource Strain: Low Risk  (6/22/2023)    Overall Financial Resource Strain (CARDIA)     Difficulty of Paying Living Expenses: Not hard at all   Food Insecurity: No Food Insecurity (6/22/2023)    Hunger Vital Sign     Worried About Running Out of Food in the Last Year: Never true     Ran Out of Food in the Last Year: Never true   Transportation Needs: No Transportation Needs (6/22/2023)    PRAPARE - Transportation     Lack of Transportation (Medical): No     Lack of Transportation (Non-Medical): No   Physical Activity: Inactive (6/22/2023)    Exercise Vital Sign     Days of Exercise per  Week: 0 days     Minutes of Exercise per Session: 0 min   Stress: No Stress Concern Present (6/22/2023)    [employer] - Occupational Stress Questionnaire     Feeling of Stress : Not at all   Social Connections: Moderately Isolated (6/22/2023)    Social Connection and Isolation Panel [NHANES]     Frequency of Communication with Friends and Family: More than three times a week     Frequency of Social Gatherings with Friends and Family: More than three times a week     Attends Sabianist Services: Never     Active Member of Clubs or Organizations: No     Attends Club or Organization Meetings: Never     Marital Status:    Housing Stability: Low Risk  (6/22/2023)    Housing Stability Vital Sign     Unable to Pay for Housing in the Last Year: No     Number of Places Lived in the Last Year: 1     Unstable Housing in the Last Year: No         Current Outpatient Medications:     aspirin (ECOTRIN) 81 MG EC tablet, Take 1 tablet (81 mg total) by mouth once daily., Disp: 30 tablet, Rfl: 0    baclofen (LIORESAL) 10 MG tablet, Take 10 mg by mouth 3 (three) times daily., Disp: , Rfl:     calcium carbonate (TUMS) 200 mg calcium (500 mg) chewable tablet, Take 1 tablet (500 mg total) by mouth 2 (two) times daily as needed., Disp: 60 tablet, Rfl: 0    cyanocobalamin (VITAMIN B-12) 1000 MCG tablet, Take 1 tablet (1,000 mcg total) by mouth once daily., Disp: 30 tablet, Rfl: 0    cyclobenzaprine (FLEXERIL) 10 MG tablet, Take 1 tablet (10 mg total) by mouth 3 (three) times daily as needed for Muscle spasms., Disp: 30 tablet, Rfl: 0    docusate sodium (COLACE) 100 MG capsule, Take 1 capsule (100 mg total) by mouth 2 (two) times daily., Disp: , Rfl: 0    DULoxetine (CYMBALTA) 30 MG capsule, Take 30 mg by mouth., Disp: , Rfl:     ergocalciferol (ERGOCALCIFEROL) 50,000 unit Cap, Take 50,000 Units by mouth every 7 days., Disp: , Rfl:     EScitalopram oxalate (LEXAPRO) 10 MG tablet, Take 1 tablet (10 mg  "total) by mouth once daily., Disp: 30 tablet, Rfl: 11    furosemide (LASIX) 20 MG tablet, Take 1 tablet by mouth once daily., Disp: , Rfl:     nortriptyline (PAMELOR) 25 MG capsule, Take 2 capsules (50 mg total) by mouth every evening., Disp: 30 capsule, Rfl: 0    ondansetron (ZOFRAN-ODT) 4 MG TbDL, Take 1 tablet (4 mg total) by mouth every 8 (eight) hours as needed., Disp: 20 tablet, Rfl: 0    ostomy supplies 1 1/2 " Misc, 1 each by FEEDING TUBE route., Disp: , Rfl:     oxyCODONE-acetaminophen (PERCOCET)  mg per tablet, Take 1 tablet by mouth every 8 (eight) hours as needed., Disp: , Rfl:     pregabalin (LYRICA) 75 MG capsule, Take 1 capsule (75 mg total) by mouth 2 (two) times daily., Disp: 14 capsule, Rfl: 0          Objective:   Vitals:  Blood pressure 112/76, pulse 65, temperature 97.4 °F (36.3 °C), resp. rate 16, height 5' 2" (1.575 m).    Physical Examination:   GEN: no apparent distress, comfortable  HEAD: non traumatic and normocephalic  EYES: no pallor, no icterus  ENT:   no pharyngeal erythema, external ears WNL; no nasal discharge  NECK: no masses, thyroid normal, trachea midline, no LAD/LN's, supple  CV: RRR with no murmur; normal pulse; normal S1 and S2; no pedal edema  CHEST: Normal respiratory effort; CTAB; normal breath sounds; no wheeze or crackles  ABDOM: nontender and nondistended; soft; no rebound/guarding, L/SNP  MUSC/Skeletal:R AKA noted, site healed, closed.  L leg NT with out edema or calf tenderness.  EXTREM: no clubbing, cyanosis, inflammation or swelling  SKIN: no rashes, lesions, ulcers, petechiae   : no cvat  NEURO: grossly intact; motor/sensory WNL;  no tremors  PSYCH: normal mood, affect and behavior  LYMPH: normal cervical, supraclavicular, axillary and groin LN's      Labs:   Lab Results   Component Value Date    WBC 5.86 11/16/2023    HGB 12.3 11/16/2023    HCT 38.0 11/16/2023    MCV 87 11/16/2023     11/16/2023    CMP  Sodium   Date Value Ref Range Status "   11/16/2023 141 136 - 145 mmol/L Final     Potassium   Date Value Ref Range Status   11/16/2023 4.5 3.5 - 5.1 mmol/L Final     Comment:     Anion Gap reference range revised on 4/28/2023     Chloride   Date Value Ref Range Status   11/16/2023 103 95 - 110 mmol/L Final     CO2   Date Value Ref Range Status   11/16/2023 26 22 - 31 mmol/L Final     Glucose   Date Value Ref Range Status   11/16/2023 94 70 - 110 mg/dL Final     Comment:     The ADA recommends the following guidelines for fasting glucose:    Normal:       less than 100 mg/dL    Prediabetes:  100 mg/dL to 125 mg/dL    Diabetes:     126 mg/dL or higher       BUN   Date Value Ref Range Status   11/16/2023 16 7 - 18 mg/dL Final     Creatinine   Date Value Ref Range Status   11/16/2023 0.75 0.50 - 1.40 mg/dL Final     Calcium   Date Value Ref Range Status   11/16/2023 9.2 8.4 - 10.2 mg/dL Final     Total Protein   Date Value Ref Range Status   11/16/2023 6.9 6.0 - 8.4 g/dL Final     Albumin   Date Value Ref Range Status   11/16/2023 4.3 3.5 - 5.2 g/dL Final     Total Bilirubin   Date Value Ref Range Status   11/16/2023 0.3 0.2 - 1.3 mg/dL Final     Alkaline Phosphatase   Date Value Ref Range Status   11/16/2023 92 38 - 145 U/L Final     AST   Date Value Ref Range Status   11/16/2023 21 14 - 36 U/L Final     ALT   Date Value Ref Range Status   11/16/2023 19 0 - 35 U/L Final     Anion Gap   Date Value Ref Range Status   11/16/2023 12 5 - 12 mmol/L Final     Comment:     Anion Gap reference range revised on 4/28/2023         Assessment:   (1) 58 y.o. female with diagnosis of cervical cancer stage 4 dx, treated with chemo x's 2 and external and internal Rad Rx.  6/3/20.    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  She has an ostomy, needs reversal.    (3)Arterial thrombi to R leg, eventual R AKA.    (4)Pulmonary Emboli, hypercoag., on eliquis 5 mg 1 BID.  D dimer had been 1000 at Dx.    (5)Anemia 2nd blood loss, B 12 low NL at  319 in past.  Hx Vitamin D deficiency.  Now Hgb 11.2, MCV 84.  WBC and plt cnt NL, NL diff.  Check B 12 and ferritin.    Call for path report R leg AKA. 6/16/23.    J Carlos Russo, Medicare Medicaid.    Lab eval, for hypercoagulation syndrome reviewed with her.   She continues on Eliquis 5 mg 1 BID.      Hypercoag. Eval. was unremarkable, coagulation was intact.    Path report from the R amputation show almost complete arterial obstruction by atheromatous plaque dx.  Peripheral Arterial Dx.    Continue Eliquis 5 mg BID for now.  Re check CTA and L leg U/S  12/2023. No clot seen.  For dental extractions 12/2023.  Need date of procedure to get her off eliquis and cover with lovenox bridge.  She will let me know of date.    RTC see me 2/2024.                 There are no diagnoses linked to this encounter.    Follow up in about 2 months (around 2/5/2024) for check blood clot status after therapy..    I have explained and the patient understands all of  the current recommendation(s). I have answered all of their questions to the best of my ability and to their complete satisfaction.

## 2024-01-11 ENCOUNTER — TELEPHONE (OUTPATIENT)
Dept: HEMATOLOGY/ONCOLOGY | Facility: CLINIC | Age: 59
End: 2024-01-11

## 2024-01-11 NOTE — TELEPHONE ENCOUNTER
Called  [facility] and spoke with Corinne and confirmed that patient will be getting extractions done on 1/25/24. Pt will need to be bridged with Lovenox. Pt to have fillings on 1/23/24 but nothing needs to be done prior to those. Advised would call patient with Lovenox instructions and fax over clearance to .

## 2024-01-11 NOTE — TELEPHONE ENCOUNTER
Called and gave instructions to patient for her tooth extraction procedure on 1/25/24.    1/22/24 @ 9am. Last dose of Eliquis  1/23/24 @ 9am inject 40mg Lovenox subq  1/24/24 @ 9am inject 40mg Lovenox subq  1/25/24 Day of Procedure HOLD EVERYTHING  1/26/24 Resume Eliquis if dentist agrees    Pt verbalized understanding.

## 2024-01-11 NOTE — TELEPHONE ENCOUNTER
Called [facility]  [phone] and spoke with JUANCARLOS Melvin. Called in Lovenox 40mg subq. Disp 2 no refills. Pt procedure 1/25/24. Pt to use Lovenox 1/23-1/24/24. Procedure on 1/25/24. See other notes for instructions.

## 2024-02-09 ENCOUNTER — TELEPHONE (OUTPATIENT)
Dept: HEMATOLOGY/ONCOLOGY | Facility: CLINIC | Age: 59
End: 2024-02-09

## 2024-02-09 DIAGNOSIS — D51.9 ANEMIA DUE TO VITAMIN B12 DEFICIENCY, UNSPECIFIED B12 DEFICIENCY TYPE: Primary | ICD-10-CM

## 2024-02-14 ENCOUNTER — TELEPHONE (OUTPATIENT)
Dept: HEMATOLOGY/ONCOLOGY | Facility: CLINIC | Age: 59
End: 2024-02-14

## 2024-02-14 NOTE — TELEPHONE ENCOUNTER
I spoke with pt and reminded her to have labs done @  prior to appt here on 2/21/24 pt verbalized understanding and confirmed time and date of appt.

## 2024-02-20 ENCOUNTER — TELEPHONE (OUTPATIENT)
Dept: HEMATOLOGY/ONCOLOGY | Facility: CLINIC | Age: 59
End: 2024-02-20

## 2024-02-20 DIAGNOSIS — D51.9 ANEMIA DUE TO VITAMIN B12 DEFICIENCY, UNSPECIFIED B12 DEFICIENCY TYPE: Primary | ICD-10-CM

## 2024-06-14 DIAGNOSIS — I26.99 ACUTE PULMONARY EMBOLISM, UNSPECIFIED PULMONARY EMBOLISM TYPE, UNSPECIFIED WHETHER ACUTE COR PULMONALE PRESENT: Primary | ICD-10-CM

## 2024-06-14 DIAGNOSIS — I26.99 ACUTE PULMONARY EMBOLISM, UNSPECIFIED PULMONARY EMBOLISM TYPE, UNSPECIFIED WHETHER ACUTE COR PULMONALE PRESENT: ICD-10-CM

## 2024-06-14 RX ORDER — ENOXAPARIN SODIUM 100 MG/ML
40 INJECTION SUBCUTANEOUS DAILY
Qty: 3 EACH | Refills: 0 | Status: SHIPPED | OUTPATIENT
Start: 2024-06-14 | End: 2024-06-14 | Stop reason: SDUPTHER

## 2024-06-14 RX ORDER — ENOXAPARIN SODIUM 100 MG/ML
40 INJECTION SUBCUTANEOUS DAILY
Qty: 3 EACH | Refills: 0 | Status: SHIPPED | OUTPATIENT
Start: 2024-06-14

## 2024-06-14 NOTE — TELEPHONE ENCOUNTER
Patient called and reported that she may need a lovenox bridge. Patient already has stopped her eliquis and aspirin. Reviewed with Dr Farr and per his verbal order, patient to get lovenox 40 mg subcutaneously on 6/14, 6/15, 6/16. Hold all blood thinners on 6/17. Resume eliquis and aspirin as scheduled on 6/18 as long as there is no bleeding or surgical complications, which should be assessed for the provider completing the procedure. Patient made aware of above and verbalized understanding.

## 2025-02-12 DIAGNOSIS — L03.012 CELLULITIS OF LEFT FINGER: Primary | ICD-10-CM

## 2025-02-20 ENCOUNTER — HOSPITAL ENCOUNTER (OUTPATIENT)
Dept: RADIOLOGY | Facility: HOSPITAL | Age: 60
Discharge: HOME | End: 2025-02-20
Attending: ORTHOPAEDIC SURGERY
Payer: MEDICARE

## 2025-02-20 DIAGNOSIS — L03.012 CELLULITIS OF LEFT FINGER: ICD-10-CM

## 2025-02-20 PROCEDURE — 73218 MRI UPPER EXTREMITY W/O DYE: CPT | Mod: TC,LT

## 2025-03-07 DIAGNOSIS — M79.605 LEFT LEG PAIN: Primary | ICD-10-CM

## 2025-03-11 ENCOUNTER — HOSPITAL ENCOUNTER (OUTPATIENT)
Dept: RADIOLOGY | Facility: HOSPITAL | Age: 60
Discharge: HOME | End: 2025-03-11
Attending: ORTHOPAEDIC SURGERY
Payer: MEDICARE

## 2025-03-11 DIAGNOSIS — M79.605 LEFT LEG PAIN: ICD-10-CM

## 2025-03-11 DIAGNOSIS — D51.9 VITAMIN B12 DEFICIENCY ANEMIA: Primary | ICD-10-CM

## 2025-03-11 PROCEDURE — 93971 EXTREMITY STUDY: CPT | Mod: TC,PO,LT

## 2025-03-11 PROCEDURE — 93971 EXTREMITY STUDY: CPT | Mod: 26,LT,, | Performed by: RADIOLOGY

## 2025-08-28 DIAGNOSIS — I73.9 CLAUDICATION: Primary | ICD-10-CM

## 2025-09-03 ENCOUNTER — HOSPITAL ENCOUNTER (OUTPATIENT)
Dept: RADIOLOGY | Facility: HOSPITAL | Age: 60
Discharge: HOME | End: 2025-09-03
Attending: SURGERY
Payer: MEDICARE

## 2025-09-03 DIAGNOSIS — I73.9 CLAUDICATION: ICD-10-CM

## 2025-09-03 PROCEDURE — 25500020 PHARM REV CODE 255: Performed by: SURGERY

## 2025-09-03 PROCEDURE — 75635 CT ANGIO ABDOMINAL ARTERIES: CPT | Mod: 26,,, | Performed by: RADIOLOGY

## 2025-09-03 PROCEDURE — 75635 CT ANGIO ABDOMINAL ARTERIES: CPT | Mod: TC

## 2025-09-03 RX ADMIN — IOHEXOL 100 ML: 350 INJECTION, SOLUTION INTRAVENOUS at 09:09

## (undated) DEVICE — BANDAGE ESMARK 6X9 55516

## (undated) DEVICE — GLOVE BIOGEL MICRO SURGEON PINK SZ 7.5

## (undated) DEVICE — DRAPE CVS SPLIT SHEET 29154

## (undated) DEVICE — DRESSING POST OP MEPILEX AG 4X8

## (undated) DEVICE — PAD BOVIE ADULT

## (undated) DEVICE — SPONGE KERLIX SUPER   NON25854

## (undated) DEVICE — SUTURE PROLENE 7-0 BV-1 24 8702H

## (undated) DEVICE — SOLUTION IRRI NS BOTTLE 1000ML R5200-01

## (undated) DEVICE — CATH BLLN OTW MUSTANG 7.0MMX40MMX75CM

## (undated) DEVICE — LOOP MINI BLUE 30-713

## (undated) DEVICE — SOLUTION SCRUB IODINE 4OZ

## (undated) DEVICE — SPONGE GAUZE 10S 4X4  442214

## (undated) DEVICE — SUTURE VICRYL 0 CT-1 8-27 JJ31G

## (undated) DEVICE — DRAPE 3/4

## (undated) DEVICE — SUTURE PROLENE 7-0 BV175-6 24 8735H

## (undated) DEVICE — GAUZE VASELINE XEROFORM 5X9 8884433605

## (undated) DEVICE — SUTURE VICRYL #0 27 CT-1 CR VCPP31D

## (undated) DEVICE — SUTURE SILK 4-0 18 A183H

## (undated) DEVICE — DRESSING POST OP MEPILEX  AG 4X10

## (undated) DEVICE — Device

## (undated) DEVICE — STAPLER SKIN NON ROTATE PXW35

## (undated) DEVICE — SUTURE VICRYL 3-0 SH 27 VCP416H

## (undated) DEVICE — GLOVE BIOGEL PI  GOLD SZ 7

## (undated) DEVICE — BASIN BLUE 32OZ 1232

## (undated) DEVICE — CATHETER EMBOLECTOMY 3FRX80CM

## (undated) DEVICE — SYSTEM PREVENA PEEL & PLACE PRE1001US

## (undated) DEVICE — CLIP APPLIER SM MCS20

## (undated) DEVICE — SHEATH PINNACLE 6FRX10CM W/GUIDEWIRE

## (undated) DEVICE — SPONGE LAP 18X18

## (undated) DEVICE — SUTURE SILK 2-0 SH 18 CR C012D

## (undated) DEVICE — GOWN SMART LRG 044673

## (undated) DEVICE — SUTURE SILK 0 18 A186H

## (undated) DEVICE — PADDING CAST 6 STERILE 30-322

## (undated) DEVICE — TRAY LOWER EXTREMITY  SMHS029-05

## (undated) DEVICE — BANDAGE ACE STERILE 4 REB3114

## (undated) DEVICE — BANDAGE KERLIX   441106

## (undated) DEVICE — BANDAGE ACE STERILE 3 REB3113

## (undated) DEVICE — SET SOLENT OMNI 120CM 6FR

## (undated) DEVICE — LOOP MAXI RED 30-722

## (undated) DEVICE — TRAY VASCULAR SLIDELL MEMORIAL

## (undated) DEVICE — UNDERGLOVE BIOGEL PI MICRO BLUE SZ 7.5

## (undated) DEVICE — GUIDEWIRE STIFF ANGLED 035X260 LAUREATE

## (undated) DEVICE — SOLUTION PREP IODINE 4OZ

## (undated) DEVICE — SPONGE GAUZE 4X8

## (undated) DEVICE — SUTURE SILK 3-0 18 A184H

## (undated) DEVICE — CUFF TOURNIQUET 34DUAL PRT 5921-034-235

## (undated) DEVICE — SUTURE PROLENE 6-0 BV-1 30 8709H

## (undated) DEVICE — INSERT FOGARTY 33 SOFT33

## (undated) DEVICE — BLADE BEAVER #69  AVER6900

## (undated) DEVICE — TAPE SILK DURAPORE 1 1538-1

## (undated) DEVICE — TOWEL OR BLUE      MDT2168284

## (undated) DEVICE — CLIP APPLIER MEDIUM MSM20

## (undated) DEVICE — SUTURE VICRYL 3-0 18 SH VCP864D

## (undated) DEVICE — WINGSET SLBCS 23X.75 12LUER

## (undated) DEVICE — TRAY SKIN PREP DRY

## (undated) DEVICE — TIP BOVIE TEFLON E1450X

## (undated) DEVICE — GUIDEWIRE REG. ANGLED .035X260 LAUREATE

## (undated) DEVICE — CATHETER TRAILBLAZER 35X135

## (undated) DEVICE — SPONGE XRAY DETECTABLE 4X4

## (undated) DEVICE — SHEATH INTRODUCER DESTINATION 6FX45

## (undated) DEVICE — CATHETER ANGIO 5FR25X65CM X0.038IN CG507

## (undated) DEVICE — HEMOSTAT SURGICEL 4X8

## (undated) DEVICE — SUTURE SILK 2-0 18 A185H

## (undated) DEVICE — DRAPE IOBAN 23X33 6651EZ

## (undated) DEVICE — SOLUTION DURAPREP 8630

## (undated) DEVICE — SUTURE MONOCRYL 4-0 PS-2 27 MCP426H

## (undated) DEVICE — SUTURE ETHILON 2 TP-1 60 825G